# Patient Record
Sex: FEMALE | Race: WHITE | NOT HISPANIC OR LATINO | Employment: OTHER | URBAN - METROPOLITAN AREA
[De-identification: names, ages, dates, MRNs, and addresses within clinical notes are randomized per-mention and may not be internally consistent; named-entity substitution may affect disease eponyms.]

---

## 2017-02-27 ENCOUNTER — CONVERSION ENCOUNTER (OUTPATIENT)
Dept: GENERAL RADIOLOGY | Facility: HOSPITAL | Age: 57
End: 2017-02-27

## 2018-05-11 ENCOUNTER — OFFICE VISIT CONVERTED (OUTPATIENT)
Dept: FAMILY MEDICINE CLINIC | Facility: CLINIC | Age: 58
End: 2018-05-11
Attending: PHYSICIAN ASSISTANT

## 2018-06-15 ENCOUNTER — CONVERSION ENCOUNTER (OUTPATIENT)
Dept: GENERAL RADIOLOGY | Facility: HOSPITAL | Age: 58
End: 2018-06-15

## 2018-08-02 ENCOUNTER — CONVERSION ENCOUNTER (OUTPATIENT)
Dept: PODIATRY | Facility: CLINIC | Age: 58
End: 2018-08-02

## 2018-08-02 ENCOUNTER — OFFICE VISIT CONVERTED (OUTPATIENT)
Dept: PODIATRY | Facility: CLINIC | Age: 58
End: 2018-08-02
Attending: PODIATRIST

## 2018-08-24 ENCOUNTER — CONVERSION ENCOUNTER (OUTPATIENT)
Dept: PODIATRY | Facility: CLINIC | Age: 58
End: 2018-08-24

## 2018-08-24 ENCOUNTER — OFFICE VISIT CONVERTED (OUTPATIENT)
Dept: PODIATRY | Facility: CLINIC | Age: 58
End: 2018-08-24
Attending: PODIATRIST

## 2018-09-14 ENCOUNTER — OFFICE VISIT CONVERTED (OUTPATIENT)
Dept: PODIATRY | Facility: CLINIC | Age: 58
End: 2018-09-14
Attending: PODIATRIST

## 2018-09-14 ENCOUNTER — CONVERSION ENCOUNTER (OUTPATIENT)
Dept: PODIATRY | Facility: CLINIC | Age: 58
End: 2018-09-14

## 2019-05-13 ENCOUNTER — CONVERSION ENCOUNTER (OUTPATIENT)
Dept: FAMILY MEDICINE CLINIC | Facility: CLINIC | Age: 59
End: 2019-05-13

## 2019-05-13 ENCOUNTER — OFFICE VISIT CONVERTED (OUTPATIENT)
Dept: FAMILY MEDICINE CLINIC | Facility: CLINIC | Age: 59
End: 2019-05-13
Attending: PHYSICIAN ASSISTANT

## 2019-05-28 ENCOUNTER — HOSPITAL ENCOUNTER (OUTPATIENT)
Dept: LAB | Facility: HOSPITAL | Age: 59
Discharge: HOME | End: 2019-05-28
Attending: PHYSICIAN ASSISTANT

## 2019-05-28 LAB
25(OH)D3 SERPL-MCNC: 20.4 NG/ML (ref 30–100)
ALBUMIN SERPL-MCNC: 4.1 G/DL (ref 3.5–5)
ALBUMIN/GLOB SERPL: 1.5 {RATIO} (ref 1.4–2.6)
ALP SERPL-CCNC: 55 U/L (ref 53–141)
ALT SERPL-CCNC: 85 U/L (ref 10–40)
ANION GAP SERPL CALC-SCNC: 19 MMOL/L (ref 8–19)
APPEARANCE UR: ABNORMAL
AST SERPL-CCNC: 50 U/L (ref 15–50)
BASOPHILS # BLD AUTO: 0.07 10*3/UL (ref 0–0.2)
BASOPHILS NFR BLD AUTO: 1.2 % (ref 0–3)
BILIRUB SERPL-MCNC: 1.12 MG/DL (ref 0.2–1.3)
BILIRUB UR QL: NEGATIVE
BUN SERPL-MCNC: 10 MG/DL (ref 5–25)
BUN/CREAT SERPL: 15 {RATIO} (ref 6–20)
CALCIUM SERPL-MCNC: 9.8 MG/DL (ref 8.7–10.4)
CHLORIDE SERPL-SCNC: 104 MMOL/L (ref 99–111)
CHOLEST SERPL-MCNC: 197 MG/DL (ref 107–200)
CHOLEST/HDLC SERPL: 3.2 {RATIO} (ref 3–6)
COLOR UR: ABNORMAL
CONV ABS IMM GRAN: 0.02 10*3/UL (ref 0–0.2)
CONV BACTERIA: NEGATIVE
CONV CO2: 24 MMOL/L (ref 22–32)
CONV COLLECTION SOURCE (UA): ABNORMAL
CONV CRYSTALS: ABNORMAL /[HPF]
CONV HYALINE CASTS IN URINE MICRO: ABNORMAL /[LPF]
CONV IMMATURE GRAN: 0.4 % (ref 0–1.8)
CONV TOTAL PROTEIN: 6.8 G/DL (ref 6.3–8.2)
CONV UROBILINOGEN IN URINE BY AUTOMATED TEST STRIP: 0.2 {EHRLICHU}/DL (ref 0.1–1)
CREAT UR-MCNC: 0.67 MG/DL (ref 0.5–0.9)
DEPRECATED RDW RBC AUTO: 41.1 FL (ref 36.4–46.3)
EOSINOPHIL # BLD AUTO: 0.22 10*3/UL (ref 0–0.7)
EOSINOPHIL # BLD AUTO: 3.9 % (ref 0–7)
ERYTHROCYTE [DISTWIDTH] IN BLOOD BY AUTOMATED COUNT: 12.5 % (ref 11.7–14.4)
EST. AVERAGE GLUCOSE BLD GHB EST-MCNC: 128 MG/DL
FOLATE SERPL-MCNC: 13.4 NG/ML (ref 4.8–20)
GFR SERPLBLD BASED ON 1.73 SQ M-ARVRAT: >60 ML/MIN/{1.73_M2}
GLOBULIN UR ELPH-MCNC: 2.7 G/DL (ref 2–3.5)
GLUCOSE SERPL-MCNC: 127 MG/DL (ref 65–99)
GLUCOSE UR QL: NEGATIVE MG/DL
HBA1C MFR BLD: 15.6 G/DL (ref 12–16)
HBA1C MFR BLD: 6.1 % (ref 3.5–5.7)
HCT VFR BLD AUTO: 47.4 % (ref 37–47)
HDLC SERPL-MCNC: 62 MG/DL (ref 40–60)
HGB UR QL STRIP: NEGATIVE
KETONES UR QL STRIP: NEGATIVE MG/DL
LDLC SERPL CALC-MCNC: 117 MG/DL (ref 70–100)
LEUKOCYTE ESTERASE UR QL STRIP: NEGATIVE
LYMPHOCYTES # BLD AUTO: 1.52 10*3/UL (ref 1–5)
MCH RBC QN AUTO: 30.1 PG (ref 27–31)
MCHC RBC AUTO-ENTMCNC: 32.9 G/DL (ref 33–37)
MCV RBC AUTO: 91.3 FL (ref 81–99)
MONOCYTES # BLD AUTO: 0.54 10*3/UL (ref 0.2–1.2)
MONOCYTES NFR BLD AUTO: 9.5 % (ref 3–10)
NEUTROPHILS # BLD AUTO: 3.34 10*3/UL (ref 2–8)
NEUTROPHILS NFR BLD AUTO: 58.4 % (ref 30–85)
NITRITE UR QL STRIP: NEGATIVE
NRBC CBCN: 0 % (ref 0–0.7)
OSMOLALITY SERPL CALC.SUM OF ELEC: 297 MOSM/KG (ref 273–304)
PH UR STRIP.AUTO: 5.5 [PH] (ref 5–8)
PLATELET # BLD AUTO: 283 10*3/UL (ref 130–400)
PMV BLD AUTO: 11.2 FL (ref 9.4–12.3)
POTASSIUM SERPL-SCNC: 4.1 MMOL/L (ref 3.5–5.3)
PROT UR QL: NEGATIVE MG/DL
RBC # BLD AUTO: 5.19 10*6/UL (ref 4.2–5.4)
RBC #/AREA URNS HPF: ABNORMAL /[HPF]
SODIUM SERPL-SCNC: 143 MMOL/L (ref 135–147)
SP GR UR: 1.02 (ref 1–1.03)
SQUAMOUS SPT QL MICRO: ABNORMAL /[HPF]
T4 FREE SERPL-MCNC: 1.1 NG/DL (ref 0.9–1.8)
TRIGL SERPL-MCNC: 88 MG/DL (ref 40–150)
TSH SERPL-ACNC: 3.19 M[IU]/L (ref 0.27–4.2)
VARIANT LYMPHS NFR BLD MANUAL: 26.6 % (ref 20–45)
VIT B12 SERPL-MCNC: 796 PG/ML (ref 211–911)
VLDLC SERPL-MCNC: 18 MG/DL (ref 5–37)
WBC # BLD AUTO: 5.71 10*3/UL (ref 4.8–10.8)
WBC #/AREA URNS HPF: ABNORMAL /[HPF]

## 2019-05-29 LAB — HAV AB SER QL IA: POSITIVE

## 2019-05-30 LAB — IRON SERPL-MCNC: 96 UG/DL (ref 60–170)

## 2019-06-17 ENCOUNTER — HOSPITAL ENCOUNTER (OUTPATIENT)
Dept: GENERAL RADIOLOGY | Facility: HOSPITAL | Age: 59
Discharge: HOME | End: 2019-06-17
Attending: PHYSICIAN ASSISTANT

## 2019-08-14 ENCOUNTER — HOSPITAL ENCOUNTER (OUTPATIENT)
Dept: GENERAL RADIOLOGY | Facility: HOSPITAL | Age: 59
Discharge: HOME | End: 2019-08-14
Attending: PHYSICIAN ASSISTANT

## 2020-01-24 ENCOUNTER — OFFICE VISIT CONVERTED (OUTPATIENT)
Dept: FAMILY MEDICINE CLINIC | Facility: CLINIC | Age: 60
End: 2020-01-24
Attending: PHYSICIAN ASSISTANT

## 2020-01-31 ENCOUNTER — OFFICE VISIT CONVERTED (OUTPATIENT)
Dept: SURGERY | Facility: CLINIC | Age: 60
End: 2020-01-31
Attending: SURGERY

## 2020-02-06 ENCOUNTER — HOSPITAL ENCOUNTER (OUTPATIENT)
Dept: PERIOP | Facility: HOSPITAL | Age: 60
Setting detail: HOSPITAL OUTPATIENT SURGERY
Discharge: HOME | End: 2020-02-06
Attending: SURGERY

## 2020-02-24 ENCOUNTER — OFFICE VISIT CONVERTED (OUTPATIENT)
Dept: SURGERY | Facility: CLINIC | Age: 60
End: 2020-02-24
Attending: SURGERY

## 2020-03-09 ENCOUNTER — OFFICE VISIT CONVERTED (OUTPATIENT)
Dept: PLASTIC SURGERY | Facility: CLINIC | Age: 60
End: 2020-03-09
Attending: PLASTIC SURGERY

## 2020-03-12 ENCOUNTER — CONVERSION ENCOUNTER (OUTPATIENT)
Dept: FAMILY MEDICINE CLINIC | Facility: CLINIC | Age: 60
End: 2020-03-12

## 2020-03-12 ENCOUNTER — OFFICE VISIT CONVERTED (OUTPATIENT)
Dept: FAMILY MEDICINE CLINIC | Facility: CLINIC | Age: 60
End: 2020-03-12
Attending: PHYSICIAN ASSISTANT

## 2020-03-18 ENCOUNTER — HOSPITAL ENCOUNTER (OUTPATIENT)
Dept: PHYSICAL THERAPY | Facility: CLINIC | Age: 60
Setting detail: RECURRING SERIES
Discharge: HOME | End: 2020-07-08
Attending: FAMILY MEDICINE

## 2020-09-22 ENCOUNTER — HOSPITAL ENCOUNTER (OUTPATIENT)
Dept: MAMMOGRAPHY | Facility: HOSPITAL | Age: 60
Discharge: HOME | End: 2020-09-22
Attending: PHYSICIAN ASSISTANT

## 2021-03-26 ENCOUNTER — OFFICE VISIT CONVERTED (OUTPATIENT)
Dept: PLASTIC SURGERY | Facility: CLINIC | Age: 61
End: 2021-03-26
Attending: PLASTIC SURGERY

## 2021-03-26 ENCOUNTER — CONVERSION ENCOUNTER (OUTPATIENT)
Dept: PLASTIC SURGERY | Facility: CLINIC | Age: 61
End: 2021-03-26

## 2021-04-02 ENCOUNTER — HOSPITAL ENCOUNTER (OUTPATIENT)
Dept: LAB | Facility: HOSPITAL | Age: 61
Discharge: HOME | End: 2021-04-02
Attending: PHYSICIAN ASSISTANT

## 2021-04-02 LAB
EST. AVERAGE GLUCOSE BLD GHB EST-MCNC: 237 MG/DL
HBA1C MFR BLD: 9.9 % (ref 3.5–5.7)

## 2021-05-14 VITALS
OXYGEN SATURATION: 98 % | WEIGHT: 237.12 LBS | TEMPERATURE: 97.8 F | HEART RATE: 89 BPM | DIASTOLIC BLOOD PRESSURE: 74 MMHG | SYSTOLIC BLOOD PRESSURE: 145 MMHG

## 2021-05-14 NOTE — PROGRESS NOTES
Progress Note      Patient Name: Cassandra Maria   Patient ID: [id number]   Sex: Female   YOB: 1960    Primary Care Provider: Manuel Thomas PA-C   Referring Provider: Manuel Thomas PA-C    Visit Date: March 26, 2021    Provider: Isis Nice MD   Location: [facility]   Location Address: [address] [place] [address]  [id number]   Location Phone: [phone]          History Of Present Illness  Cassandra Maria is a 60 year old /White female who presents to the office today as a consult from Manuel Thomas PA-C.   Cassandra Maria is a 59 year old /White female who presents to the office today as a consult from Manuel Thomas PA-C and would like to discuss breast reduction. Current bra size 46 G , would like to be smaller. Positive family (mom and sister) have a history of breast cancer. Neck, shoulders, and upper back pain present for 20+ years. Conservative treatments of ibuprofen, physical therapy , with little or temporary relief. Experiences rashes, treats with baby powder, and has been prescribed anti fungal powder. Can't sleep on back, can't breathe. Cannot exercise , cannot do ACTIVITIES that she would like to do, unable to wear a sports bra that fits .      Patient would like her breasts to be smaller, more proportionate. Mother (diagnosed at 68)  and sister (diagnosed at 42) had breast cancer. No genetic testing. Mammogram in 9/2020, benign. Had any physical therapy for neck and back pain but pulling of breast during activity caused more pain and headaches. Has been prescribed Nystatin for the rashes under breasts. Does not smoke. Denies being diabetic or hypertension problems but last A1c 6.1%. Will see PCP for physical and surgery clearance prior to procedure.  Patient is unemployed.         Recommendation: get physical by PCP, submit to insurance for  breast reduction       Past Medical History  Bone Density Screening; Foot  pain, right; Fracture of 5th metatarsal; Gall Stones; Heel pain; Hernia; Hot flashes; Hyperlipidemia; Impaired fasting glucose; Joint pain; Low back pain; Lumbar pain with radiation down left leg; Macromastia; Migraine Headaches; Night sweats; Obesity; Osteopenia; Osteoporosis; Pain, Thoracic Spine; Screening Mammogram; Sinus Trouble; unspecified; Vitamin D deficiency         Past Surgical History  Bladder sling surgery in female; Caesarean section; Cholecystectomy; Colonoscopy; EGD; Hernia-Ventral; Hysterectomy; Rotator Cuff repair         Medication List  multivitamin oral tablet; nystatin 100,000 unit/gram topical powder         Allergy List  NO KNOWN DRUG ALLERGIES       Allergies Reconciled  Family Medical History  Breast Neoplasm, Malignant; Liver Neoplasm, Malignant; Colon Cancer; Lung cancer; Diabetes         Reproductive History   1 Para 1 0 0 0 & Postmenopausal       Social History  Active but no formal exercise; Alcohol (Never); ; No known infection risk; Tobacco (Never)         Immunizations  Name Date Admin   Influenza 2017   Influenza 2015   Prevnar 13 2017   Tdap 2016         Vitals  Date Time BP Position Site L\R Cuff Size HR RR TEMP (F) WT  HT  BMI kg/m2 BSA m2 O2 Sat FR L/min FiO2 HC       2021 11:23 /74 Sitting    89 - R  97.8 237lbs 2oz    98 %  21%          Physical Examination  · Constitutional  o Appearance  o : well developed, well-nourished, Alert and oriented X3  · Eyes  o Sclerae  o : sclerae white  · Respiratory  o Respiratory Effort  o : breathing unlabored   · Cardiovascular  o Heart  o : regular rate  · Breasts  o FEMALE BREAST EXAM  o :   § Masses  § : no masses  § Nipple Discharge  § : no nipple discharge  § Axillary Lymphadenopathy  § : no axillary lymphadenopathy  § SN-N (Right Breast)  § : 42  § SN-N (Left Breast)  § : 41  § SN-IMF (Right Breast)  § : 28  § SN-IMF (Left Breast)  § : 28  § N-IMF stretch (Right Breast)  § :  16  § N-IMF stretch (Left Breast)  § : 16  · Gastrointestinal  o Abdominal Examination  o : abdomen nontender to palpation  · Lymphatic  o Axilla  o : No lymphadenopathy present  · Skin and Subcutaneous Tissue  o General Inspection  o : no lesions present, no areas of discoloration, skin turgor normal, texture normal  · Psychiatric  o Judgement and Insight  o : judgment and insight intact  o Mood and Affect  o : mood normal, affect appropriate  · Schnur Scale  o Schnur Scale Score  o : 819  · BSA  o BSA  o : 2.16     rash and moisture under breasts with hyperpigmentation, significant shoulder grooving right breast larger than left           Assessment  · Macromastia       Hypertrophy of breast     611.1/N62  · Obesity     278.00/E66.9      Plan  · Orders  o Plastics reconstructive visit (PSREC) - - 03/26/2021  o ACO-16: BMI above normal range and follow-up plan is documented (, ) - - 03/26/2021  · Medications  o Medications have been Reconciled  o Transition of Care or Provider Policy  · Instructions  o The patient was given literature in regards to the procedure and encouraged to visit the websites of ASPS and ASAPS.  o Greater than 45 min of time was spent directly with the pt in counseling & coordination of care.  o We discussed the procedure for bilateral breast reduction under general anesthesia as well as the postoperative recovery time and the need for limitation of activities. The risks of surgery were discussed including bleeding, infection, scarring (for which diagrams were drawn), pain, poor healing, loss of skin and or nipple, change in nipple sensation, inability to breast feed, asymmetry, no guarantee of postoperative cup size. Patient has tried conservative treatment and feel breast size is impeding weight loss and activity. Discussed possible free nipple graft due to size of breast. Patient understands risk and wants to proceed. Will submit to insurance for approval.   o I think that this  patient is an excellent candidate for a breast reduction. I feel that this procedure is medically necessary to relieve her symptoms. I would anticipate resecting at least 819 grams of breast tissue from each breast. Photographs were taken. We will contact the insurance company for preauthorization. This patient has my office number to call with any further questions.  o Electronically Identified Patient Education Materials Provided Electronically            Electronically Signed by: GENESIS Reinoso -Author on March 26, 2021 12:02:05 PM

## 2021-05-15 VITALS
DIASTOLIC BLOOD PRESSURE: 79 MMHG | OXYGEN SATURATION: 95 % | HEART RATE: 85 BPM | BODY MASS INDEX: 47.93 KG/M2 | WEIGHT: 244.12 LBS | HEIGHT: 60 IN | SYSTOLIC BLOOD PRESSURE: 146 MMHG

## 2021-05-15 VITALS
SYSTOLIC BLOOD PRESSURE: 146 MMHG | HEART RATE: 96 BPM | WEIGHT: 242.5 LBS | DIASTOLIC BLOOD PRESSURE: 92 MMHG | HEIGHT: 60 IN | BODY MASS INDEX: 47.61 KG/M2 | OXYGEN SATURATION: 95 %

## 2021-05-15 VITALS
SYSTOLIC BLOOD PRESSURE: 135 MMHG | BODY MASS INDEX: 47.71 KG/M2 | HEIGHT: 60 IN | OXYGEN SATURATION: 98 % | WEIGHT: 243 LBS | DIASTOLIC BLOOD PRESSURE: 67 MMHG | HEART RATE: 76 BPM

## 2021-05-15 VITALS
SYSTOLIC BLOOD PRESSURE: 143 MMHG | OXYGEN SATURATION: 95 % | DIASTOLIC BLOOD PRESSURE: 88 MMHG | HEIGHT: 60 IN | WEIGHT: 241.5 LBS | BODY MASS INDEX: 47.41 KG/M2 | HEART RATE: 91 BPM

## 2021-05-15 VITALS — HEIGHT: 60 IN | RESPIRATION RATE: 16 BRPM | WEIGHT: 240 LBS | BODY MASS INDEX: 47.12 KG/M2

## 2021-05-15 VITALS — BODY MASS INDEX: 47.32 KG/M2 | HEIGHT: 60 IN | WEIGHT: 241 LBS | RESPIRATION RATE: 14 BRPM

## 2021-05-16 VITALS
OXYGEN SATURATION: 99 % | BODY MASS INDEX: 46.13 KG/M2 | SYSTOLIC BLOOD PRESSURE: 149 MMHG | WEIGHT: 235 LBS | HEIGHT: 60 IN | DIASTOLIC BLOOD PRESSURE: 73 MMHG | HEART RATE: 72 BPM

## 2021-05-16 VITALS — HEIGHT: 60 IN | OXYGEN SATURATION: 99 % | WEIGHT: 233 LBS | HEART RATE: 82 BPM | BODY MASS INDEX: 45.75 KG/M2

## 2021-05-16 VITALS
DIASTOLIC BLOOD PRESSURE: 75 MMHG | SYSTOLIC BLOOD PRESSURE: 146 MMHG | HEIGHT: 60 IN | BODY MASS INDEX: 45.18 KG/M2 | OXYGEN SATURATION: 97 % | HEART RATE: 77 BPM | WEIGHT: 230.12 LBS

## 2021-05-16 VITALS
HEIGHT: 60 IN | DIASTOLIC BLOOD PRESSURE: 60 MMHG | OXYGEN SATURATION: 100 % | SYSTOLIC BLOOD PRESSURE: 135 MMHG | WEIGHT: 233 LBS | BODY MASS INDEX: 45.75 KG/M2 | HEART RATE: 67 BPM

## 2021-05-22 ENCOUNTER — TRANSCRIBE ORDERS (OUTPATIENT)
Dept: LAB | Facility: HOSPITAL | Age: 61
End: 2021-05-22

## 2021-05-22 DIAGNOSIS — Z01.818 PRE-OP TESTING: Primary | ICD-10-CM

## 2021-07-07 ENCOUNTER — TELEPHONE (OUTPATIENT)
Dept: PLASTIC SURGERY | Facility: CLINIC | Age: 61
End: 2021-07-07

## 2021-07-07 NOTE — TELEPHONE ENCOUNTER
Patient called back asking for additional providers as Dr. Polanco/Dr. Zhang office does not accept her insurance. Provided with [facility] plastic surgery and  plastic surgery phone numbers.

## 2021-07-07 NOTE — TELEPHONE ENCOUNTER
Patient called back. Advised that Dr. Nice would no longer be with [facility] [place] of [place]21. That with this new development all surgical cases are being canceled as appropriate follow up care cannot occur. Patient verbalized understanding. Patient states that she will contact Dr. Polanco/Dr. Zhang office to schedule consultation

## 2021-07-07 NOTE — TELEPHONE ENCOUNTER
advised that Dr. Nice would no longer be with [facility] as of 7/31/21. That with this new development all surgical cases are being canceled as appropriate follow up care cannot occur. Patient verbalized understanding. Patient will contact Dr. Polanco/Dr. Zhang office to establish care.

## 2021-08-12 ENCOUNTER — TELEPHONE (OUTPATIENT)
Dept: FAMILY MEDICINE CLINIC | Facility: CLINIC | Age: 61
End: 2021-08-12

## 2021-08-12 DIAGNOSIS — Z12.31 ENCOUNTER FOR SCREENING MAMMOGRAM FOR MALIGNANT NEOPLASM OF BREAST: Primary | ICD-10-CM

## 2021-08-12 NOTE — TELEPHONE ENCOUNTER
Caller: Cassandra Maria    Relationship to patient: Self    Best call back number: [phone]    Patient is needing: PATIENT CALLED STATING SHE GETS A MAMMOGRAM EVERY YEAR AND THAT ITS ABOUT THAT TIME FOR HER TO GET ONE. SHE STATED NORMALLY SHE GETS A LETTER IN THE MAIL STATING ITS TIME TO SCHEDULE THE APPOINTMENT, PATIENT IS UNSURE IF SHE NEEDS TO JUST LET HER PCP KNOW THAT SHE NEEDS A REFERRAL TO BE SENT OVER OR IF SHE HAS TO MAKE AN APPOINTMENT. PATIENT ALSO WOULD LIKE THE REFERRAL TO BE SENT TO THE [facility] BEHIND THE OFFICE. SHE IS UNSURE WHAT THE ADDRESS IS. PATIENT WOULD LIKE A CALL BACK REGARDING THIS SITUATION PLEASE ADVISE THANK YOU .

## 2021-09-14 ENCOUNTER — TELEPHONE (OUTPATIENT)
Dept: FAMILY MEDICINE CLINIC | Facility: CLINIC | Age: 61
End: 2021-09-14

## 2021-09-14 DIAGNOSIS — R73.01 IMPAIRED FASTING GLUCOSE: Primary | ICD-10-CM

## 2021-09-14 DIAGNOSIS — E55.9 VITAMIN D DEFICIENCY: ICD-10-CM

## 2021-09-14 DIAGNOSIS — E78.5 HYPERLIPIDEMIA, UNSPECIFIED HYPERLIPIDEMIA TYPE: ICD-10-CM

## 2021-09-14 PROBLEM — S92.353A FRACTURE OF 5TH METATARSAL: Status: ACTIVE | Noted: 2018-08-02

## 2021-09-14 PROBLEM — E66.9 OBESITY: Status: ACTIVE | Noted: 2018-05-11

## 2021-09-14 PROBLEM — G43.909 MIGRAINE: Status: ACTIVE | Noted: 2021-09-14

## 2021-09-14 PROBLEM — K80.20 GALL STONES: Status: ACTIVE | Noted: 2021-09-14

## 2021-09-14 NOTE — TELEPHONE ENCOUNTER
Caller: Cassandra Maria    Relationship: Self    Best call back number: [phone]    Medication needed:   METFORMIN 500 MG TWICE DAILY     When do you need the refill by: ASAP    What additional details did the patient provide when requesting the medication: PATIENT HAS NONE    Does the patient have less than a 3 day supply:  [x] Yes  [] No    What is the patient's preferred pharmacy: [id number] [place] - [address] AT [facility] [phone] [facility] [phone]      PATIENT WOULD LIKE TO HAVE LABS AHEAD OF HER APPOINTMENT. PLEASE CALL PATIENT AND ADVISE.

## 2021-09-22 ENCOUNTER — LAB (OUTPATIENT)
Dept: LAB | Facility: HOSPITAL | Age: 61
End: 2021-09-22

## 2021-09-22 DIAGNOSIS — E78.5 HYPERLIPIDEMIA, UNSPECIFIED HYPERLIPIDEMIA TYPE: ICD-10-CM

## 2021-09-22 DIAGNOSIS — R89.9 ABNORMAL LABORATORY TEST: ICD-10-CM

## 2021-09-22 DIAGNOSIS — E55.9 VITAMIN D DEFICIENCY: ICD-10-CM

## 2021-09-22 DIAGNOSIS — R73.01 IMPAIRED FASTING GLUCOSE: ICD-10-CM

## 2021-09-22 LAB
25(OH)D3 SERPL-MCNC: 19.3 NG/ML
ALBUMIN SERPL-MCNC: 4.1 G/DL (ref 3.5–5.2)
ALBUMIN/GLOB SERPL: 1.4 G/DL
ALP SERPL-CCNC: 65 U/L (ref 39–117)
ALT SERPL W P-5'-P-CCNC: 109 U/L (ref 1–33)
ANION GAP SERPL CALCULATED.3IONS-SCNC: 11 MMOL/L (ref 5–15)
AST SERPL-CCNC: 62 U/L (ref 1–32)
BASOPHILS # BLD AUTO: 0.07 10*3/MM3 (ref 0–0.2)
BASOPHILS NFR BLD AUTO: 1.3 % (ref 0–1.5)
BILIRUB SERPL-MCNC: 1.5 MG/DL (ref 0–1.2)
BUN SERPL-MCNC: 12 MG/DL (ref 8–23)
BUN/CREAT SERPL: 18.8 (ref 7–25)
CALCIUM SPEC-SCNC: 9.5 MG/DL (ref 8.6–10.5)
CHLORIDE SERPL-SCNC: 101 MMOL/L (ref 98–107)
CHOLEST SERPL-MCNC: 246 MG/DL (ref 0–200)
CO2 SERPL-SCNC: 23 MMOL/L (ref 22–29)
CREAT SERPL-MCNC: 0.64 MG/DL (ref 0.57–1)
DEPRECATED RDW RBC AUTO: 39.9 FL (ref 37–54)
EOSINOPHIL # BLD AUTO: 0.27 10*3/MM3 (ref 0–0.4)
EOSINOPHIL NFR BLD AUTO: 5 % (ref 0.3–6.2)
ERYTHROCYTE [DISTWIDTH] IN BLOOD BY AUTOMATED COUNT: 11.7 % (ref 12.3–15.4)
GFR SERPL CREATININE-BSD FRML MDRD: 94 ML/MIN/1.73
GLOBULIN UR ELPH-MCNC: 2.9 GM/DL
GLUCOSE SERPL-MCNC: 250 MG/DL (ref 65–99)
HBA1C MFR BLD: 9.69 % (ref 4.8–5.6)
HCT VFR BLD AUTO: 48.2 % (ref 34–46.6)
HDLC SERPL-MCNC: 56 MG/DL (ref 40–60)
HGB BLD-MCNC: 16.4 G/DL (ref 12–15.9)
IMM GRANULOCYTES # BLD AUTO: 0.02 10*3/MM3 (ref 0–0.05)
IMM GRANULOCYTES NFR BLD AUTO: 0.4 % (ref 0–0.5)
LDLC SERPL CALC-MCNC: 170 MG/DL (ref 0–100)
LDLC/HDLC SERPL: 3 {RATIO}
LYMPHOCYTES # BLD AUTO: 1.64 10*3/MM3 (ref 0.7–3.1)
LYMPHOCYTES NFR BLD AUTO: 30.3 % (ref 19.6–45.3)
MCH RBC QN AUTO: 31.4 PG (ref 26.6–33)
MCHC RBC AUTO-ENTMCNC: 34 G/DL (ref 31.5–35.7)
MCV RBC AUTO: 92.3 FL (ref 79–97)
MONOCYTES # BLD AUTO: 0.45 10*3/MM3 (ref 0.1–0.9)
MONOCYTES NFR BLD AUTO: 8.3 % (ref 5–12)
NEUTROPHILS NFR BLD AUTO: 2.96 10*3/MM3 (ref 1.7–7)
NEUTROPHILS NFR BLD AUTO: 54.7 % (ref 42.7–76)
NRBC BLD AUTO-RTO: 0 /100 WBC (ref 0–0.2)
PLATELET # BLD AUTO: 265 10*3/MM3 (ref 140–450)
PMV BLD AUTO: 11.4 FL (ref 6–12)
POTASSIUM SERPL-SCNC: 4.1 MMOL/L (ref 3.5–5.2)
PROT SERPL-MCNC: 7 G/DL (ref 6–8.5)
RBC # BLD AUTO: 5.22 10*6/MM3 (ref 3.77–5.28)
SODIUM SERPL-SCNC: 135 MMOL/L (ref 136–145)
T4 FREE SERPL-MCNC: 1.1 NG/DL (ref 0.93–1.7)
TRIGL SERPL-MCNC: 111 MG/DL (ref 0–150)
TSH SERPL DL<=0.05 MIU/L-ACNC: 1.25 UIU/ML (ref 0.27–4.2)
VLDLC SERPL-MCNC: 20 MG/DL (ref 5–40)
WBC # BLD AUTO: 5.41 10*3/MM3 (ref 3.4–10.8)

## 2021-09-22 PROCEDURE — 80053 COMPREHEN METABOLIC PANEL: CPT

## 2021-09-22 PROCEDURE — 83540 ASSAY OF IRON: CPT

## 2021-09-22 PROCEDURE — 82306 VITAMIN D 25 HYDROXY: CPT

## 2021-09-22 PROCEDURE — 80061 LIPID PANEL: CPT

## 2021-09-22 PROCEDURE — 84439 ASSAY OF FREE THYROXINE: CPT

## 2021-09-22 PROCEDURE — 84466 ASSAY OF TRANSFERRIN: CPT

## 2021-09-22 PROCEDURE — 85025 COMPLETE CBC W/AUTO DIFF WBC: CPT

## 2021-09-22 PROCEDURE — 36415 COLL VENOUS BLD VENIPUNCTURE: CPT

## 2021-09-22 PROCEDURE — 83036 HEMOGLOBIN GLYCOSYLATED A1C: CPT

## 2021-09-22 PROCEDURE — 84443 ASSAY THYROID STIM HORMONE: CPT

## 2021-09-23 ENCOUNTER — TELEPHONE (OUTPATIENT)
Dept: FAMILY MEDICINE CLINIC | Facility: CLINIC | Age: 61
End: 2021-09-23

## 2021-09-23 DIAGNOSIS — E55.9 VITAMIN D DEFICIENCY: ICD-10-CM

## 2021-09-23 DIAGNOSIS — R89.9 ABNORMAL LABORATORY TEST: Primary | ICD-10-CM

## 2021-09-23 DIAGNOSIS — R79.89 ELEVATED LFTS: ICD-10-CM

## 2021-09-23 LAB
IRON 24H UR-MRATE: 118 MCG/DL (ref 37–145)
IRON SATN MFR SERPL: 27 % (ref 20–50)
TIBC SERPL-MCNC: 435 MCG/DL (ref 298–536)
TRANSFERRIN SERPL-MCNC: 292 MG/DL (ref 200–360)

## 2021-09-23 RX ORDER — ERGOCALCIFEROL 1.25 MG/1
50000 CAPSULE ORAL
Qty: 13 CAPSULE | Refills: 1 | Status: SHIPPED | OUTPATIENT
Start: 2021-09-23 | End: 2021-09-30 | Stop reason: SDUPTHER

## 2021-09-23 RX ORDER — IBUPROFEN 800 MG/1
TABLET ORAL
COMMUNITY
End: 2022-05-03 | Stop reason: SDUPTHER

## 2021-09-23 RX ORDER — OFLOXACIN 3 MG/ML
SOLUTION/ DROPS OPHTHALMIC
COMMUNITY
Start: 2021-08-13 | End: 2022-01-03

## 2021-09-23 RX ORDER — DICLOFENAC SODIUM 1 MG/ML
SOLUTION/ DROPS OPHTHALMIC
COMMUNITY
Start: 2021-08-13 | End: 2022-01-03

## 2021-09-23 RX ORDER — PREDNISOLONE ACETATE 10 MG/ML
SUSPENSION/ DROPS OPHTHALMIC
COMMUNITY
Start: 2021-08-15 | End: 2022-01-03

## 2021-09-23 NOTE — TELEPHONE ENCOUNTER
----- Message from JUDD Solis sent at 9/23/2021 12:47 PM EDT -----  T4/TSH - normal  DM - uncontrolled - Hgba1c 9.6  Elevated LFTs - obtain Liver U/S  Chol - poor control, low chol diet and exercise  Vit D Def - begin vit D 50,000 IU weekly #[id number]  Elevated Hgb/Hct - add iron level to labs, does pt smoke?    Ensure pt has a follow up appt with me to discuss labs 30min.

## 2021-09-23 NOTE — TELEPHONE ENCOUNTER
Called pt to advise of results, rx, US, and add on lab ordered. Call transferred to front office to schedule appt

## 2021-09-30 ENCOUNTER — OFFICE VISIT (OUTPATIENT)
Dept: FAMILY MEDICINE CLINIC | Facility: CLINIC | Age: 61
End: 2021-09-30

## 2021-09-30 VITALS
BODY MASS INDEX: 44.88 KG/M2 | OXYGEN SATURATION: 97 % | HEIGHT: 60 IN | WEIGHT: 228.6 LBS | HEART RATE: 80 BPM | SYSTOLIC BLOOD PRESSURE: 147 MMHG | DIASTOLIC BLOOD PRESSURE: 78 MMHG

## 2021-09-30 DIAGNOSIS — E66.01 CLASS 3 SEVERE OBESITY DUE TO EXCESS CALORIES WITH SERIOUS COMORBIDITY AND BODY MASS INDEX (BMI) OF 40.0 TO 44.9 IN ADULT (HCC): Chronic | ICD-10-CM

## 2021-09-30 DIAGNOSIS — Z13.820 ENCOUNTER FOR OSTEOPOROSIS SCREENING IN ASYMPTOMATIC POSTMENOPAUSAL PATIENT: ICD-10-CM

## 2021-09-30 DIAGNOSIS — E11.65 UNCONTROLLED TYPE 2 DIABETES MELLITUS WITH HYPERGLYCEMIA (HCC): Primary | ICD-10-CM

## 2021-09-30 DIAGNOSIS — E55.9 VITAMIN D DEFICIENCY: Chronic | ICD-10-CM

## 2021-09-30 DIAGNOSIS — Z78.0 ENCOUNTER FOR OSTEOPOROSIS SCREENING IN ASYMPTOMATIC POSTMENOPAUSAL PATIENT: ICD-10-CM

## 2021-09-30 DIAGNOSIS — E55.9 VITAMIN D DEFICIENCY: ICD-10-CM

## 2021-09-30 DIAGNOSIS — Z23 NEED FOR INFLUENZA VACCINATION: ICD-10-CM

## 2021-09-30 PROBLEM — E66.813 CLASS 3 SEVERE OBESITY DUE TO EXCESS CALORIES WITH SERIOUS COMORBIDITY AND BODY MASS INDEX (BMI) OF 40.0 TO 44.9 IN ADULT: Status: ACTIVE | Noted: 2018-05-11

## 2021-09-30 PROCEDURE — 90471 IMMUNIZATION ADMIN: CPT | Performed by: PHYSICIAN ASSISTANT

## 2021-09-30 PROCEDURE — 99214 OFFICE O/P EST MOD 30 MIN: CPT | Performed by: PHYSICIAN ASSISTANT

## 2021-09-30 PROCEDURE — 90686 IIV4 VACC NO PRSV 0.5 ML IM: CPT | Performed by: PHYSICIAN ASSISTANT

## 2021-09-30 RX ORDER — ERGOCALCIFEROL 1.25 MG/1
50000 CAPSULE ORAL
Qty: 13 CAPSULE | Refills: 1 | Status: SHIPPED | OUTPATIENT
Start: 2021-09-30 | End: 2022-09-13

## 2021-09-30 RX ORDER — SEMAGLUTIDE 1.34 MG/ML
0.25 INJECTION, SOLUTION SUBCUTANEOUS WEEKLY
Qty: 1 PEN | Refills: 0 | Status: SHIPPED | OUTPATIENT
Start: 2021-09-30 | End: 2021-10-05

## 2021-09-30 NOTE — PROGRESS NOTES
"Chief Complaint  Follow-up ([facility] lab work) Pt is concerned with A1C and would like to discuss this.      Subjective          Cassandra Maria presents to [facility]  History of Present Illness    Pt is scheduled for breast reduction surgery, but her plastic surgeon left the practice.  So that is on hold.    Her recent labs reveal new onset DM, NIDDM    Pt has increased thirst, and some visual issues. She disc with her ophth, he felt it was diabetes related.    No CP, SOA, HA    Objective      Vital Signs:   /78 (BP Location: Right arm, Patient Position: Sitting, Cuff Size: Adult)   Pulse 80   Ht 152.4 cm (60\")   Wt 104 kg (228 lb 9.6 oz)   SpO2 97%   BMI 44.65 kg/m²       Physical Exam  Vitals and nursing note reviewed.   Constitutional:       Appearance: Normal appearance. She is obese.   HENT:      Head: Normocephalic and atraumatic.   Cardiovascular:      Rate and Rhythm: Normal rate and regular rhythm.   Pulmonary:      Effort: Pulmonary effort is normal.      Breath sounds: Normal breath sounds.   Musculoskeletal:      Cervical back: Neck supple.   Neurological:      Mental Status: She is alert.   Psychiatric:         Mood and Affect: Mood normal.         Behavior: Behavior normal.        Result Review :     Common labs    Common Labsle 4/2/21 9/22/21 9/22/21 9/22/21 9/22/21     1507 1507 1507 1507   Glucose     250 (A)   BUN     12   Creatinine     0.64   eGFR Non African Am     94   Sodium     135 (A)   Potassium     4.1   Chloride     101   Calcium     9.5   Albumin     4.10   Total Bilirubin     1.5 (A)   Alkaline Phosphatase     65   AST (SGOT)     62 (A)   ALT (SGPT)     109 (A)   WBC  5.41      Hemoglobin  16.4 (A)      Hematocrit  48.2 (A)      Platelets  265      Total Cholesterol    246 (A)    Triglycerides    111    HDL Cholesterol    56    LDL Cholesterol     170 (A)    Hemoglobin A1C 9.9 (A)  9.69 (A)     (A) Abnormal value       Comments are " available for some flowsheets but are not being displayed.                     Assessment and Plan      Diagnoses and all orders for this visit:    1. Uncontrolled type 2 diabetes mellitus with hyperglycemia (CMS/[facility]) (Primary)  -     metFORMIN (Glucophage) 1000 MG tablet; Take 1 tablet by mouth 2 (Two) Times a Day With Meals.  Dispense: 180 tablet; Refill: 1  -     Semaglutide,0.25 or 0.5MG/DOS, (Ozempic, 0.25 or 0.5 MG/DOSE,) 2 MG/1.5ML solution pen-injector; Inject 0.25 mg under the skin into the appropriate area as directed 1 (One) Time Per Week.  Dispense: 1 pen; Refill: 0  -     Hemoglobin A1c; Future  -     MicroAlbumin, Urine, Random - Urine, Clean Catch; Future  -     Comprehensive Metabolic Panel; Future    2. Vitamin D deficiency  Comments:  Vit D 50,000 IU weekly - stable  Orders:  -     vitamin D (ERGOCALCIFEROL) 1.25 MG (96660 UT) capsule capsule; Take 1 capsule by mouth Every 7 (Seven) Days.  Dispense: 13 capsule; Refill: 1  -     Vitamin D 25 Hydroxy; Future    3. Vitamin D deficiency  -     vitamin D (ERGOCALCIFEROL) 1.25 MG (03023 UT) capsule capsule; Take 1 capsule by mouth Every 7 (Seven) Days.  Dispense: 13 capsule; Refill: 1  -     Vitamin D 25 Hydroxy; Future    4. Encounter for osteoporosis screening in asymptomatic postmenopausal patient  -     DEXA Bone Density Axial    5. Need for influenza vaccination  -     FluLaval/Fluarix >6 Months (1021-1098)    6. Class 3 severe obesity due to excess calories with serious comorbidity and body mass index (BMI) of 40.0 to 44.9 in adult (CMS/[facility])  Comments:  Disc diet and exercise, fair control            Follow Up     Return in about 3 months (around 12/30/2021).     Patient was given instructions and counseling regarding her condition or for health maintenance advice. Please see specific information pulled into the AVS if appropriate.     I have reviewed information obtained and documented by others and I have confirmed the accuracy of this documented  note.    JUDD Solis

## 2021-10-04 ENCOUNTER — TELEPHONE (OUTPATIENT)
Dept: FAMILY MEDICINE CLINIC | Facility: CLINIC | Age: 61
End: 2021-10-04

## 2021-10-04 DIAGNOSIS — R73.01 IMPAIRED FASTING GLUCOSE: Primary | ICD-10-CM

## 2021-10-04 DIAGNOSIS — E11.65 UNCONTROLLED TYPE 2 DIABETES MELLITUS WITH HYPERGLYCEMIA (HCC): ICD-10-CM

## 2021-10-05 RX ORDER — DULAGLUTIDE 0.75 MG/.5ML
0.75 INJECTION, SOLUTION SUBCUTANEOUS WEEKLY
Qty: 2 ML | Refills: 0 | Status: SHIPPED | OUTPATIENT
Start: 2021-10-05 | End: 2021-10-28

## 2021-10-12 ENCOUNTER — TELEPHONE (OUTPATIENT)
Dept: FAMILY MEDICINE CLINIC | Facility: CLINIC | Age: 61
End: 2021-10-12

## 2021-10-12 ENCOUNTER — HOSPITAL ENCOUNTER (OUTPATIENT)
Dept: ULTRASOUND IMAGING | Facility: HOSPITAL | Age: 61
Discharge: HOME | End: 2021-10-12
Admitting: PHYSICIAN ASSISTANT

## 2021-10-12 DIAGNOSIS — R93.2 ABNORMAL ULTRASOUND OF LIVER: Primary | ICD-10-CM

## 2021-10-12 DIAGNOSIS — R79.89 ELEVATED LFTS: ICD-10-CM

## 2021-10-12 PROCEDURE — 76705 ECHO EXAM OF ABDOMEN: CPT

## 2021-10-12 NOTE — TELEPHONE ENCOUNTER
----- Message from JUDD Solis sent at 10/12/2021  9:08 AM EDT -----  Consult GI - abn LFTs and abn US Liver

## 2021-10-28 DIAGNOSIS — E11.65 UNCONTROLLED TYPE 2 DIABETES MELLITUS WITH HYPERGLYCEMIA (HCC): ICD-10-CM

## 2021-10-28 RX ORDER — DULAGLUTIDE 0.75 MG/.5ML
INJECTION, SOLUTION SUBCUTANEOUS
Qty: 2 ML | Refills: 0 | Status: SHIPPED | OUTPATIENT
Start: 2021-10-28 | End: 2021-11-29

## 2021-11-12 ENCOUNTER — HOSPITAL ENCOUNTER (OUTPATIENT)
Dept: MAMMOGRAPHY | Facility: HOSPITAL | Age: 61
Discharge: HOME | End: 2021-11-12
Admitting: PHYSICIAN ASSISTANT

## 2021-11-12 PROCEDURE — 77067 SCR MAMMO BI INCL CAD: CPT

## 2021-11-12 PROCEDURE — 77063 BREAST TOMOSYNTHESIS BI: CPT

## 2021-11-15 ENCOUNTER — TELEPHONE (OUTPATIENT)
Dept: FAMILY MEDICINE CLINIC | Facility: CLINIC | Age: 61
End: 2021-11-15

## 2021-11-25 DIAGNOSIS — E11.65 UNCONTROLLED TYPE 2 DIABETES MELLITUS WITH HYPERGLYCEMIA (HCC): ICD-10-CM

## 2021-11-29 RX ORDER — DULAGLUTIDE 0.75 MG/.5ML
INJECTION, SOLUTION SUBCUTANEOUS
Qty: 2 ML | Refills: 0 | Status: SHIPPED | OUTPATIENT
Start: 2021-11-29 | End: 2021-12-22

## 2021-12-01 ENCOUNTER — OFFICE VISIT (OUTPATIENT)
Dept: PLASTIC SURGERY | Facility: CLINIC | Age: 61
End: 2021-12-01

## 2021-12-01 VITALS
OXYGEN SATURATION: 98 % | WEIGHT: 219 LBS | TEMPERATURE: 96.8 F | DIASTOLIC BLOOD PRESSURE: 91 MMHG | BODY MASS INDEX: 43 KG/M2 | HEIGHT: 60 IN | SYSTOLIC BLOOD PRESSURE: 180 MMHG | HEART RATE: 78 BPM

## 2021-12-01 DIAGNOSIS — N62 MACROMASTIA: Primary | ICD-10-CM

## 2021-12-01 PROCEDURE — 99215 OFFICE O/P EST HI 40 MIN: CPT | Performed by: SURGERY

## 2021-12-01 NOTE — PROGRESS NOTES
"Chief Complaint  Follow-up (discuss BBR)    Subjective          History of Present Illness  Cassandra Maria is a 61 y.o. female who presents to [facility] as a consult from Dr. Thomas and would like to discuss breast reduction.  Her current bra size is a 46 G,  cup.  She would like to be a  Cup:C cup.  She patient DOES have a personal or family history of breast cancer.  Neck, shoulders, and upper back pain present for 20 years.  Conservative treatments of Physical therapy , with little or temporary relief.  Experiences rashes, treats with baby powder.  Trouble sleeping, cannot get comfortable.  Trouble exercising, cannot do activities that she would like to do, generally has to wear many sports bras and has to special order bras.  Recommendations for today of breast reduction.  Recommendations for weight loss in order to lower the Schur requirement. Her last mammogram was 11/2021 and it was benign. She is diabetic and her last A1c is 9.69 from Sept 2021.   Patient needs mesh in breast.   Patient needs to get her A1C at 7 or below to proceed with surgery.   Spoke to patient about brow lift and bleph. Needs a eye field study test.     Allergies: Patient has no known allergies.  Allergies Reconciled.    Review of Systems     Objective     /91 (BP Location: Left arm, Patient Position: Sitting)   Pulse 78   Temp 96.8 °F (36 °C) (Temporal)   Ht 152.4 cm (60\")   Wt 99.3 kg (219 lb)   SpO2 98%   BMI 42.77 kg/m²     Body mass index is 42.77 kg/m².    Physical Exam  Masses: No masses  Nipple Discharge: No nipple discharge  Breast Symmetry:  Axillary Lymphadenopathy: No axillary lymphadenopathy  SN-N (Right Breast):43   SN-N (Left Breast):41        N-IMF (Right Breast):18  N-IMF (Left Breast): 20  Base right - 20  Base left-20  Schnur Scale Score:  575    Result Review :     Labs reviewed           Assessment and Plan      Diagnoses and all orders for this " visit:    1. Macromastia (Primary)             Plan:  • The patient was given literature in regards to the procedure and encouraged to visit the websites of ASPS and ASAPS.  • Pictures obtained.  • We will have the patient send the report or undergo a pre-operative mammogram.  • We will have the patient obtain pre-operative clearance.  • We discussed the procedure for bilateral breast reduction under general anesthesia as well as the postoperative recover time and the need for limitation of activities.  The risks of surgery were discussed including bleeding, infection, scarring (for which diagrams were drawn), pain, poor healing, loss of skin and or nipple, change in nipple sensation, inability to breast feed, asymmetry, no guarantee of post-operative cup size.    • I think that this patient is an excellent candidate for a breast reduction.  If feel that this procedure is medically necessary to relieve her symptoms.  I would anticipate resecting at least 600 grams of breast tissue from each breast.  • Specifically on her case, she is diabetic and her last A1c is above 9. I would like that value to be lower than 7. Once her A1c improves, she will contact us and we will contact the insurance company for pre-authorization.  I plan to proceed with lower pole nipple perfusion and place galaflex mesh to hold the breast tissue to decrease bottoming out.   • This patient has my office number to call with any further questions.     I noted patient's eyes with ptotic brow and dermatochalasis, I will go ahead and order visual field test for future procedure  CPT:  66944-58 bilateral  Breast reduction  54521-76 bilateral placement of mesh.     Mesh:   galasform 3D [id number]    I spent 60 minutes caring for Cassandra on this date of service. This time includes time spent by me in the following activities:performing a medically appropriate examination and/or evaluation , counseling and educating the patient/family/caregiver,  documenting information in the medical record, and care coordination    Follow Up     No follow-ups on file.    Patient was given instructions and counseling regarding her condition. Please see specific information pulled into the AVS if appropriate.     Daniele Walker MD  12/01/2021

## 2021-12-21 DIAGNOSIS — E11.65 UNCONTROLLED TYPE 2 DIABETES MELLITUS WITH HYPERGLYCEMIA (HCC): ICD-10-CM

## 2021-12-22 RX ORDER — DULAGLUTIDE 0.75 MG/.5ML
INJECTION, SOLUTION SUBCUTANEOUS
Qty: 2 ML | Refills: 0 | Status: SHIPPED | OUTPATIENT
Start: 2021-12-22 | End: 2022-01-18

## 2021-12-30 ENCOUNTER — LAB (OUTPATIENT)
Dept: LAB | Facility: HOSPITAL | Age: 61
End: 2021-12-30

## 2021-12-30 DIAGNOSIS — E11.65 UNCONTROLLED TYPE 2 DIABETES MELLITUS WITH HYPERGLYCEMIA: ICD-10-CM

## 2021-12-30 DIAGNOSIS — E78.5 HYPERLIPIDEMIA, UNSPECIFIED HYPERLIPIDEMIA TYPE: ICD-10-CM

## 2021-12-30 DIAGNOSIS — E55.9 VITAMIN D DEFICIENCY: Chronic | ICD-10-CM

## 2021-12-30 LAB
25(OH)D3 SERPL-MCNC: 38.1 NG/ML
ALBUMIN SERPL-MCNC: 4.4 G/DL (ref 3.5–5.2)
ALBUMIN UR-MCNC: 2.5 MG/DL
ALBUMIN/GLOB SERPL: 2.1 G/DL
ALP SERPL-CCNC: 45 U/L (ref 39–117)
ALT SERPL W P-5'-P-CCNC: 74 U/L (ref 1–33)
ANION GAP SERPL CALCULATED.3IONS-SCNC: 8.6 MMOL/L (ref 5–15)
AST SERPL-CCNC: 54 U/L (ref 1–32)
BACTERIA UR QL AUTO: ABNORMAL /HPF
BILIRUB SERPL-MCNC: 1.2 MG/DL (ref 0–1.2)
BILIRUB UR QL STRIP: NEGATIVE
BUN SERPL-MCNC: 6 MG/DL (ref 8–23)
BUN/CREAT SERPL: 9.8 (ref 7–25)
CALCIUM SPEC-SCNC: 9.4 MG/DL (ref 8.6–10.5)
CHLORIDE SERPL-SCNC: 105 MMOL/L (ref 98–107)
CLARITY UR: ABNORMAL
CO2 SERPL-SCNC: 24.4 MMOL/L (ref 22–29)
COLOR UR: ABNORMAL
CREAT SERPL-MCNC: 0.61 MG/DL (ref 0.57–1)
GFR SERPL CREATININE-BSD FRML MDRD: 100 ML/MIN/1.73
GLOBULIN UR ELPH-MCNC: 2.1 GM/DL
GLUCOSE SERPL-MCNC: 101 MG/DL (ref 65–99)
GLUCOSE UR STRIP-MCNC: NEGATIVE MG/DL
HBA1C MFR BLD: 6.21 % (ref 4.8–5.6)
HGB UR QL STRIP.AUTO: NEGATIVE
HYALINE CASTS UR QL AUTO: ABNORMAL /LPF
KETONES UR QL STRIP: ABNORMAL
LEUKOCYTE ESTERASE UR QL STRIP.AUTO: ABNORMAL
NITRITE UR QL STRIP: POSITIVE
PH UR STRIP.AUTO: 5.5 [PH] (ref 5–8)
POTASSIUM SERPL-SCNC: 3.9 MMOL/L (ref 3.5–5.2)
PROT SERPL-MCNC: 6.5 G/DL (ref 6–8.5)
PROT UR QL STRIP: ABNORMAL
RBC # UR STRIP: ABNORMAL /HPF
REF LAB TEST METHOD: ABNORMAL
SODIUM SERPL-SCNC: 138 MMOL/L (ref 136–145)
SP GR UR STRIP: 1.02 (ref 1–1.03)
SQUAMOUS #/AREA URNS HPF: ABNORMAL /HPF
UROBILINOGEN UR QL STRIP: ABNORMAL
WBC # UR STRIP: ABNORMAL /HPF

## 2021-12-30 PROCEDURE — 82306 VITAMIN D 25 HYDROXY: CPT

## 2021-12-30 PROCEDURE — 36415 COLL VENOUS BLD VENIPUNCTURE: CPT

## 2021-12-30 PROCEDURE — 87086 URINE CULTURE/COLONY COUNT: CPT

## 2021-12-30 PROCEDURE — 82043 UR ALBUMIN QUANTITATIVE: CPT

## 2021-12-30 PROCEDURE — 87077 CULTURE AEROBIC IDENTIFY: CPT

## 2021-12-30 PROCEDURE — 87186 SC STD MICRODIL/AGAR DIL: CPT

## 2021-12-30 PROCEDURE — 83036 HEMOGLOBIN GLYCOSYLATED A1C: CPT

## 2021-12-30 PROCEDURE — 81001 URINALYSIS AUTO W/SCOPE: CPT

## 2021-12-30 PROCEDURE — 80053 COMPREHEN METABOLIC PANEL: CPT

## 2022-01-01 LAB — BACTERIA SPEC AEROBE CULT: ABNORMAL

## 2022-01-02 DIAGNOSIS — N30.00 ACUTE CYSTITIS WITHOUT HEMATURIA: Primary | ICD-10-CM

## 2022-01-02 RX ORDER — NITROFURANTOIN 25; 75 MG/1; MG/1
100 CAPSULE ORAL 2 TIMES DAILY
Qty: 14 CAPSULE | Refills: 0 | Status: SHIPPED | OUTPATIENT
Start: 2022-01-02 | End: 2022-04-19

## 2022-01-03 ENCOUNTER — TELEPHONE (OUTPATIENT)
Dept: PLASTIC SURGERY | Facility: CLINIC | Age: 62
End: 2022-01-03

## 2022-01-03 ENCOUNTER — OFFICE VISIT (OUTPATIENT)
Dept: FAMILY MEDICINE CLINIC | Facility: CLINIC | Age: 62
End: 2022-01-03

## 2022-01-03 ENCOUNTER — TELEPHONE (OUTPATIENT)
Dept: FAMILY MEDICINE CLINIC | Facility: CLINIC | Age: 62
End: 2022-01-03

## 2022-01-03 VITALS
WEIGHT: 216 LBS | SYSTOLIC BLOOD PRESSURE: 147 MMHG | HEART RATE: 76 BPM | BODY MASS INDEX: 42.41 KG/M2 | HEIGHT: 60 IN | DIASTOLIC BLOOD PRESSURE: 75 MMHG | OXYGEN SATURATION: 96 %

## 2022-01-03 DIAGNOSIS — N30.00 ACUTE CYSTITIS WITHOUT HEMATURIA: ICD-10-CM

## 2022-01-03 DIAGNOSIS — E66.01 CLASS 3 SEVERE OBESITY DUE TO EXCESS CALORIES WITH SERIOUS COMORBIDITY AND BODY MASS INDEX (BMI) OF 40.0 TO 44.9 IN ADULT: Chronic | ICD-10-CM

## 2022-01-03 DIAGNOSIS — E11.65 CONTROLLED TYPE 2 DIABETES MELLITUS WITH HYPERGLYCEMIA, WITHOUT LONG-TERM CURRENT USE OF INSULIN: Chronic | ICD-10-CM

## 2022-01-03 DIAGNOSIS — Z23 NEED FOR PNEUMOCOCCAL VACCINATION: ICD-10-CM

## 2022-01-03 DIAGNOSIS — R79.89 ELEVATED LFTS: Primary | Chronic | ICD-10-CM

## 2022-01-03 PROCEDURE — 99214 OFFICE O/P EST MOD 30 MIN: CPT | Performed by: PHYSICIAN ASSISTANT

## 2022-01-03 NOTE — TELEPHONE ENCOUNTER
Patient called today stating she had her bloodwork done on 12/30/2021 and her A1C was 6.21. Patient wanted to know if she can schedule her BBR ? Please advise . Thank you

## 2022-01-03 NOTE — TELEPHONE ENCOUNTER
----- Message from JUDD Solis sent at 12/31/2021  9:49 AM EST -----  Urine culture pending.  Elevated LFTs - improving some - avoid alcohol and tylenol, decrease wt

## 2022-01-03 NOTE — PROGRESS NOTES
Chief Complaint  Hyperlipidemia (3 month follow up) and Vitamin D Deficiency    Subjective          Cassandratomas Maria presents to [facility]  History of Present Illness  Pt presents today for 3 month follow up.    Pt states she would like to discuss recent labs from 21.    Pt has up coming appt w/Diane Hemphill on 1/10/22 - GI -     Pt will schd w/  for breast reduction since her A1C is below 7.    Labs 21  A1C 6.21  Dexa 22    No CP, SOA, HA    Pt is scheduled for breast reduction surgery since her Hgba1c is normal    Past Medical History:   Diagnosis Date   • Condition not found     Hernia   • Foot pain, right 2018   • Fracture of 5th metatarsal 2018   • Gall stones    • Heel pain    • Hot flashes 04/10/2014   • Impaired fasting glucose 2018   • Joint pain 2014   • Low back pain    • Lumbar pain with radiation down left leg 2015   • Macromastia    • Migraine headache    • Night sweats    • Obesity 2018   • Osteoporosis 2014   • Pain in thoracic spine 04/10/2014   • Sinus trouble    • Vitamin D deficiency 04/10/2014      Family History   Problem Relation Age of Onset   • Breast cancer Mother    • Diabetes Mother    • Liver cancer Father    • Lung cancer Father    • Breast cancer Sister    • Diabetes Sister    • Colon cancer Paternal Aunt 67         age not listed   • Diabetes Paternal Aunt       Past Surgical History:   Procedure Laterality Date   •  SECTION     • CHOLECYSTECTOMY     • COLONOSCOPY  2016    5yr due    • ENDOSCOPY  2016   • HYSTERECTOMY     • INCONTINENCE SURGERY     • ROTATOR CUFF REPAIR      right   • VENTRAL HERNIA REPAIR          Current Outpatient Medications:   •  ibuprofen (ADVIL,MOTRIN) 800 MG tablet, ibuprofen 800 mg oral tablet take 1 tablet (800 mg) by oral route 3 times per day with food   Suspended, Disp: , Rfl:   •  metFORMIN (Glucophage) 1000 MG  "tablet, Take 1 tablet by mouth 2 (Two) Times a Day With Meals., Disp: 180 tablet, Rfl: 1  •  nitrofurantoin, macrocrystal-monohydrate, (Macrobid) 100 MG capsule, Take 1 capsule by mouth 2 (Two) Times a Day., Disp: 14 capsule, Rfl: 0  •  Trulicity 0.75 MG/0.5ML solution pen-injector, ADMINISTER 0.75 MG UNDER THE SKIN 1 TIME A WEEK AS DIRECTED, Disp: 2 mL, Rfl: 0  •  vitamin D (ERGOCALCIFEROL) 1.25 MG (00325 UT) capsule capsule, Take 1 capsule by mouth Every 7 (Seven) Days., Disp: 13 capsule, Rfl: 1    Current Facility-Administered Medications:   •  pneumococcal polysaccharide 23-valent (PNEUMOVAX-23) vaccine 0.5 mL, 0.5 mL, Intramuscular, During Hospitalization, Marcos Thomas PA    Objective   Vital Signs:     /75   Pulse 76   Ht 152.4 cm (60\")   Wt 98 kg (216 lb)   SpO2 96%   BMI 42.18 kg/m²    Estimated body mass index is 42.18 kg/m² as calculated from the following:    Height as of this encounter: 152.4 cm (60\").    Weight as of this encounter: 98 kg (216 lb).     Physical Exam  Vitals and nursing note reviewed.   Constitutional:       Appearance: Normal appearance. She is obese.   HENT:      Head: Normocephalic and atraumatic.   Cardiovascular:      Rate and Rhythm: Normal rate and regular rhythm.   Pulmonary:      Effort: Pulmonary effort is normal.      Breath sounds: Normal breath sounds.   Musculoskeletal:      Cervical back: Neck supple.   Neurological:      Mental Status: She is alert.   Psychiatric:         Mood and Affect: Mood normal.         Behavior: Behavior normal.        Result Review :     Common labs    Common Labsle 4/2/21 9/22/21 9/22/21 9/22/21 9/22/21 12/30/21 12/30/21 12/30/21     1507 1507 1507 1507 1147 1147 1246   Glucose     250 (A) 101 (A)     BUN     12 6 (A)     Creatinine     0.64 0.61     eGFR Non  Am     94 100     Sodium     135 (A) 138     Potassium     4.1 3.9     Chloride     101 105     Calcium     9.5 9.4     Albumin     4.10 4.40     Total Bilirubin     1.5 " (A) 1.2     Alkaline Phosphatase     65 45     AST (SGOT)     62 (A) 54 (A)     ALT (SGPT)     109 (A) 74 (A)     WBC  5.41         Hemoglobin  16.4 (A)         Hematocrit  48.2 (A)         Platelets  265         Total Cholesterol    246 (A)       Triglycerides    111       HDL Cholesterol    56       LDL Cholesterol     170 (A)       Hemoglobin A1C 9.9 (A)  9.69 (A)    6.21 (A)    Microalbumin, Urine        2.5   (A) Abnormal value       Comments are available for some flowsheets but are not being displayed.                         Assessment and Plan      Diagnoses and all orders for this visit:    1. Elevated LFTs (Primary)  Comments:  Cont GI, cont diet and wt loss    2. Controlled type 2 diabetes mellitus with hyperglycemia, without long-term current use of insulin (HCC)  Comments:  Pt hgba1c has improved since GLP1    3. Acute cystitis without hematuria  Comments:  Macrobid sent to pharmacy    4. Need for pneumococcal vaccination  -     pneumococcal polysaccharide 23-valent (PNEUMOVAX-23) vaccine 0.5 mL    5. Class 3 severe obesity due to excess calories with serious comorbidity and body mass index (BMI) of 40.0 to 44.9 in adult (HCC)  Comments:  Disc diet and exercise       Follow Up     Return in about 4 months (around 5/3/2022).    Patient was given instructions and counseling regarding her condition or for health maintenance advice. Please see specific information pulled into the AVS if appropriate.     I have reviewed information obtained and documented by others and I have confirmed the accuracy of this documented note.    JUDD Solis

## 2022-01-18 DIAGNOSIS — E11.65 UNCONTROLLED TYPE 2 DIABETES MELLITUS WITH HYPERGLYCEMIA: ICD-10-CM

## 2022-01-18 RX ORDER — DULAGLUTIDE 0.75 MG/.5ML
INJECTION, SOLUTION SUBCUTANEOUS
Qty: 2 ML | Refills: 0 | Status: SHIPPED | OUTPATIENT
Start: 2022-01-18 | End: 2022-02-21

## 2022-01-19 ENCOUNTER — HOSPITAL ENCOUNTER (OUTPATIENT)
Dept: BONE DENSITY | Facility: HOSPITAL | Age: 62
Discharge: HOME | End: 2022-01-19
Admitting: PHYSICIAN ASSISTANT

## 2022-01-19 PROCEDURE — 77080 DXA BONE DENSITY AXIAL: CPT

## 2022-01-20 ENCOUNTER — TELEPHONE (OUTPATIENT)
Dept: FAMILY MEDICINE CLINIC | Facility: CLINIC | Age: 62
End: 2022-01-20

## 2022-01-20 ENCOUNTER — PREP FOR SURGERY (OUTPATIENT)
Dept: OTHER | Facility: HOSPITAL | Age: 62
End: 2022-01-20

## 2022-01-20 DIAGNOSIS — N62 MACROMASTIA: Primary | ICD-10-CM

## 2022-01-20 RX ORDER — CEFAZOLIN SODIUM 2 G/100ML
2 INJECTION, SOLUTION INTRAVENOUS ONCE
Status: CANCELLED | OUTPATIENT
Start: 2022-01-20 | End: 2022-01-20

## 2022-01-20 NOTE — TELEPHONE ENCOUNTER
----- Message from JUDD Solis sent at 1/20/2022 10:19 AM EST -----  Your bone density study reveals that you have osteopenia.  This means that your bones are thinner, more brittle than normal.  There are two areas evaluated:  spine and hips.  What you need to do now is increase dietary or supplementation of Vitamin D and Calcium, increase weight bearing exercises and if you smoke, quit as soon as possible.  We should re-evaluate your bone density study (DEXA Scan) in 1-2 years to see if it is remaining stable or are you continuing to lose bone thickness which could develop into true osteoporosis.

## 2022-02-19 DIAGNOSIS — E11.65 UNCONTROLLED TYPE 2 DIABETES MELLITUS WITH HYPERGLYCEMIA: ICD-10-CM

## 2022-02-21 RX ORDER — DULAGLUTIDE 0.75 MG/.5ML
INJECTION, SOLUTION SUBCUTANEOUS
Qty: 2 ML | Refills: 0 | Status: SHIPPED | OUTPATIENT
Start: 2022-02-21 | End: 2022-03-21

## 2022-03-07 NOTE — PROGRESS NOTES
"Pre-op Exam (BBR WITH MESH )            History of Present Illness  Cassandra Maria is a 61 y.o. female who presents to [facility] for Pre-Operative Examination for Breast Reduction scheduled 4/1/22          Subjective        Patient has no known allergies.  Allergies Reconciled.    Review of Systems   All review of system has been reviewed and it  is negative except the ones note above.     Objective     /72 (BP Location: Left arm, Patient Position: Sitting)   Pulse 79   Temp 97.8 °F (36.6 °C)   Ht 152.4 cm (60\")   Wt 98 kg (216 lb)   SpO2 98%   BMI 42.18 kg/m²     Body mass index is 42.18 kg/m².    Physical Exam    Result Review :   The following data was reviewed by: Daniele Walker MD on 03/09/2022:             Assessment and Plan      Diagnoses and all orders for this visit:    1. Macromastia (Primary)  -     oxyCODONE-acetaminophen (Percocet) 5-325 MG per tablet; Take 1 tablet by mouth Every 6 (Six) Hours As Needed for Moderate Pain .  Dispense: 30 tablet; Refill: 0    Other orders  -     promethazine (PHENERGAN) 12.5 MG tablet; Take 1 tablet by mouth Every 6 (Six) Hours As Needed for Nausea or Vomiting.  Dispense: 20 tablet; Refill: 0  -     cephalexin (KEFLEX) 500 MG capsule; Take 1 capsule by mouth 4 (Four) Times a Day for 3 days.  Dispense: 12 capsule; Refill: 0        Plan:  • Given these options, the patient has verbally expressed an understanding of the risks of surgery and finds these risks acceptable. We will proceed with surgery as soon as possible.  • Covid Immunization status: up to date.   • Medications sent to pharmacy    Follow Up     No follow-ups on file.    Patient was given instructions and counseling regarding her condition. Please see specific information pulled into the AVS if appropriate.     Daniele Walker MD  03/09/2022  "

## 2022-03-09 ENCOUNTER — OFFICE VISIT (OUTPATIENT)
Dept: PLASTIC SURGERY | Facility: CLINIC | Age: 62
End: 2022-03-09

## 2022-03-09 VITALS
HEART RATE: 79 BPM | HEIGHT: 60 IN | WEIGHT: 216 LBS | TEMPERATURE: 97.8 F | BODY MASS INDEX: 42.41 KG/M2 | OXYGEN SATURATION: 98 % | DIASTOLIC BLOOD PRESSURE: 72 MMHG | SYSTOLIC BLOOD PRESSURE: 158 MMHG

## 2022-03-09 DIAGNOSIS — N62 MACROMASTIA: Primary | ICD-10-CM

## 2022-03-09 PROCEDURE — 99213 OFFICE O/P EST LOW 20 MIN: CPT | Performed by: SURGERY

## 2022-03-09 RX ORDER — CEPHALEXIN 500 MG/1
500 CAPSULE ORAL 4 TIMES DAILY
Qty: 12 CAPSULE | Refills: 0 | Status: SHIPPED | OUTPATIENT
Start: 2022-03-09 | End: 2022-03-12

## 2022-03-09 RX ORDER — OXYCODONE HYDROCHLORIDE AND ACETAMINOPHEN 5; 325 MG/1; MG/1
1 TABLET ORAL EVERY 6 HOURS PRN
Qty: 30 TABLET | Refills: 0 | Status: SHIPPED | OUTPATIENT
Start: 2022-03-09 | End: 2022-05-03

## 2022-03-09 RX ORDER — PROMETHAZINE HYDROCHLORIDE 12.5 MG/1
12.5 TABLET ORAL EVERY 6 HOURS PRN
Qty: 20 TABLET | Refills: 0 | Status: SHIPPED | OUTPATIENT
Start: 2022-03-09 | End: 2022-05-03

## 2022-03-21 DIAGNOSIS — E11.65 UNCONTROLLED TYPE 2 DIABETES MELLITUS WITH HYPERGLYCEMIA: ICD-10-CM

## 2022-03-21 RX ORDER — DULAGLUTIDE 0.75 MG/.5ML
INJECTION, SOLUTION SUBCUTANEOUS
Qty: 2 ML | Refills: 0 | Status: SHIPPED | OUTPATIENT
Start: 2022-03-21 | End: 2022-04-18

## 2022-03-27 DIAGNOSIS — E11.65 UNCONTROLLED TYPE 2 DIABETES MELLITUS WITH HYPERGLYCEMIA: ICD-10-CM

## 2022-04-01 ENCOUNTER — HOSPITAL ENCOUNTER (OUTPATIENT)
Facility: HOSPITAL | Age: 62
Setting detail: HOSPITAL OUTPATIENT SURGERY
Discharge: HOME | End: 2022-04-01
Attending: SURGERY | Admitting: SURGERY

## 2022-04-01 ENCOUNTER — ANESTHESIA (OUTPATIENT)
Dept: PERIOP | Facility: HOSPITAL | Age: 62
End: 2022-04-01

## 2022-04-01 ENCOUNTER — ANESTHESIA EVENT (OUTPATIENT)
Dept: PERIOP | Facility: HOSPITAL | Age: 62
End: 2022-04-01

## 2022-04-01 VITALS
WEIGHT: 206.79 LBS | HEART RATE: 76 BPM | OXYGEN SATURATION: 95 % | SYSTOLIC BLOOD PRESSURE: 107 MMHG | TEMPERATURE: 97.4 F | HEIGHT: 60 IN | RESPIRATION RATE: 16 BRPM | DIASTOLIC BLOOD PRESSURE: 68 MMHG | BODY MASS INDEX: 40.6 KG/M2

## 2022-04-01 DIAGNOSIS — N62 MACROMASTIA: ICD-10-CM

## 2022-04-01 LAB
ANION GAP SERPL CALCULATED.3IONS-SCNC: 11.8 MMOL/L (ref 5–15)
BUN SERPL-MCNC: 8 MG/DL (ref 8–23)
BUN/CREAT SERPL: 13.3 (ref 7–25)
CALCIUM SPEC-SCNC: 9.6 MG/DL (ref 8.6–10.5)
CHLORIDE SERPL-SCNC: 107 MMOL/L (ref 98–107)
CO2 SERPL-SCNC: 22.2 MMOL/L (ref 22–29)
CREAT SERPL-MCNC: 0.6 MG/DL (ref 0.57–1)
EGFRCR SERPLBLD CKD-EPI 2021: 102.3 ML/MIN/1.73
GLUCOSE BLDC GLUCOMTR-MCNC: 176 MG/DL (ref 70–99)
GLUCOSE SERPL-MCNC: 119 MG/DL (ref 65–99)
POTASSIUM SERPL-SCNC: 4.4 MMOL/L (ref 3.5–5.2)
QT INTERVAL: 379 MS
SODIUM SERPL-SCNC: 141 MMOL/L (ref 136–145)

## 2022-04-01 PROCEDURE — 25010000002 HYDROMORPHONE 1 MG/ML SOLUTION: Performed by: NURSE ANESTHETIST, CERTIFIED REGISTERED

## 2022-04-01 PROCEDURE — 25010000002 PROPOFOL 10 MG/ML EMULSION: Performed by: NURSE ANESTHETIST, CERTIFIED REGISTERED

## 2022-04-01 PROCEDURE — 25010000002 FENTANYL CITRATE (PF) 50 MCG/ML SOLUTION: Performed by: NURSE ANESTHETIST, CERTIFIED REGISTERED

## 2022-04-01 PROCEDURE — 25010000002 MIDAZOLAM PER 1 MG: Performed by: ANESTHESIOLOGY

## 2022-04-01 PROCEDURE — 25010000002 DEXAMETHASONE PER 1 MG: Performed by: NURSE ANESTHETIST, CERTIFIED REGISTERED

## 2022-04-01 PROCEDURE — 25010000002 ONDANSETRON PER 1 MG: Performed by: NURSE ANESTHETIST, CERTIFIED REGISTERED

## 2022-04-01 PROCEDURE — 88305 TISSUE EXAM BY PATHOLOGIST: CPT | Performed by: SURGERY

## 2022-04-01 PROCEDURE — 19318 BREAST REDUCTION: CPT | Performed by: SURGERY

## 2022-04-01 PROCEDURE — 80048 BASIC METABOLIC PNL TOTAL CA: CPT | Performed by: ANESTHESIOLOGY

## 2022-04-01 PROCEDURE — 25010000002 CEFAZOLIN IN DEXTROSE 2-4 GM/100ML-% SOLUTION: Performed by: SURGERY

## 2022-04-01 PROCEDURE — 93010 ELECTROCARDIOGRAM REPORT: CPT | Performed by: INTERNAL MEDICINE

## 2022-04-01 PROCEDURE — 82962 GLUCOSE BLOOD TEST: CPT

## 2022-04-01 PROCEDURE — 93005 ELECTROCARDIOGRAM TRACING: CPT | Performed by: ANESTHESIOLOGY

## 2022-04-01 RX ORDER — GLYCOPYRROLATE 0.2 MG/ML
0.2 INJECTION INTRAMUSCULAR; INTRAVENOUS
Status: COMPLETED | OUTPATIENT
Start: 2022-04-01 | End: 2022-04-01

## 2022-04-01 RX ORDER — ONDANSETRON 2 MG/ML
INJECTION INTRAMUSCULAR; INTRAVENOUS AS NEEDED
Status: DISCONTINUED | OUTPATIENT
Start: 2022-04-01 | End: 2022-04-01 | Stop reason: SURG

## 2022-04-01 RX ORDER — PROMETHAZINE HYDROCHLORIDE 25 MG/1
25 SUPPOSITORY RECTAL ONCE AS NEEDED
Status: DISCONTINUED | OUTPATIENT
Start: 2022-04-01 | End: 2022-04-01 | Stop reason: HOSPADM

## 2022-04-01 RX ORDER — LIDOCAINE HYDROCHLORIDE 20 MG/ML
INJECTION, SOLUTION INFILTRATION; PERINEURAL AS NEEDED
Status: DISCONTINUED | OUTPATIENT
Start: 2022-04-01 | End: 2022-04-01 | Stop reason: SURG

## 2022-04-01 RX ORDER — PROPOFOL 10 MG/ML
VIAL (ML) INTRAVENOUS AS NEEDED
Status: DISCONTINUED | OUTPATIENT
Start: 2022-04-01 | End: 2022-04-01 | Stop reason: SURG

## 2022-04-01 RX ORDER — ACETAMINOPHEN 500 MG
1000 TABLET ORAL ONCE
Status: COMPLETED | OUTPATIENT
Start: 2022-04-01 | End: 2022-04-01

## 2022-04-01 RX ORDER — SUCCINYLCHOLINE/SOD CL,ISO/PF 100 MG/5ML
SYRINGE (ML) INTRAVENOUS AS NEEDED
Status: DISCONTINUED | OUTPATIENT
Start: 2022-04-01 | End: 2022-04-01 | Stop reason: SURG

## 2022-04-01 RX ORDER — ONDANSETRON 2 MG/ML
4 INJECTION INTRAMUSCULAR; INTRAVENOUS ONCE AS NEEDED
Status: DISCONTINUED | OUTPATIENT
Start: 2022-04-01 | End: 2022-04-01 | Stop reason: HOSPADM

## 2022-04-01 RX ORDER — OXYCODONE HYDROCHLORIDE 5 MG/1
5 TABLET ORAL
Status: DISCONTINUED | OUTPATIENT
Start: 2022-04-01 | End: 2022-04-01 | Stop reason: HOSPADM

## 2022-04-01 RX ORDER — MAGNESIUM HYDROXIDE 1200 MG/15ML
LIQUID ORAL AS NEEDED
Status: DISCONTINUED | OUTPATIENT
Start: 2022-04-01 | End: 2022-04-01 | Stop reason: HOSPADM

## 2022-04-01 RX ORDER — DEXMEDETOMIDINE HYDROCHLORIDE 100 UG/ML
INJECTION, SOLUTION INTRAVENOUS AS NEEDED
Status: DISCONTINUED | OUTPATIENT
Start: 2022-04-01 | End: 2022-04-01 | Stop reason: SURG

## 2022-04-01 RX ORDER — CEFAZOLIN SODIUM 2 G/100ML
2 INJECTION, SOLUTION INTRAVENOUS ONCE
Status: COMPLETED | OUTPATIENT
Start: 2022-04-01 | End: 2022-04-01

## 2022-04-01 RX ORDER — MIDAZOLAM HYDROCHLORIDE 1 MG/ML
2 INJECTION INTRAMUSCULAR; INTRAVENOUS ONCE
Status: COMPLETED | OUTPATIENT
Start: 2022-04-01 | End: 2022-04-01

## 2022-04-01 RX ORDER — ROCURONIUM BROMIDE 10 MG/ML
INJECTION, SOLUTION INTRAVENOUS AS NEEDED
Status: DISCONTINUED | OUTPATIENT
Start: 2022-04-01 | End: 2022-04-01 | Stop reason: SURG

## 2022-04-01 RX ORDER — MEPERIDINE HYDROCHLORIDE 25 MG/ML
12.5 INJECTION INTRAMUSCULAR; INTRAVENOUS; SUBCUTANEOUS
Status: DISCONTINUED | OUTPATIENT
Start: 2022-04-01 | End: 2022-04-01 | Stop reason: HOSPADM

## 2022-04-01 RX ORDER — SODIUM CHLORIDE, SODIUM LACTATE, POTASSIUM CHLORIDE, CALCIUM CHLORIDE 600; 310; 30; 20 MG/100ML; MG/100ML; MG/100ML; MG/100ML
9 INJECTION, SOLUTION INTRAVENOUS CONTINUOUS PRN
Status: DISCONTINUED | OUTPATIENT
Start: 2022-04-01 | End: 2022-04-01 | Stop reason: HOSPADM

## 2022-04-01 RX ORDER — PROMETHAZINE HYDROCHLORIDE 12.5 MG/1
25 TABLET ORAL ONCE AS NEEDED
Status: DISCONTINUED | OUTPATIENT
Start: 2022-04-01 | End: 2022-04-01 | Stop reason: HOSPADM

## 2022-04-01 RX ORDER — DEXAMETHASONE SODIUM PHOSPHATE 4 MG/ML
INJECTION, SOLUTION INTRA-ARTICULAR; INTRALESIONAL; INTRAMUSCULAR; INTRAVENOUS; SOFT TISSUE AS NEEDED
Status: DISCONTINUED | OUTPATIENT
Start: 2022-04-01 | End: 2022-04-01 | Stop reason: SURG

## 2022-04-01 RX ORDER — FENTANYL CITRATE 50 UG/ML
INJECTION, SOLUTION INTRAMUSCULAR; INTRAVENOUS AS NEEDED
Status: DISCONTINUED | OUTPATIENT
Start: 2022-04-01 | End: 2022-04-01 | Stop reason: SURG

## 2022-04-01 RX ORDER — KETAMINE HYDROCHLORIDE 50 MG/ML
INJECTION, SOLUTION, CONCENTRATE INTRAMUSCULAR; INTRAVENOUS AS NEEDED
Status: DISCONTINUED | OUTPATIENT
Start: 2022-04-01 | End: 2022-04-01 | Stop reason: SURG

## 2022-04-01 RX ADMIN — MIDAZOLAM HYDROCHLORIDE 2 MG: 1 INJECTION, SOLUTION INTRAMUSCULAR; INTRAVENOUS at 11:49

## 2022-04-01 RX ADMIN — SUGAMMADEX 400 MG: 100 INJECTION, SOLUTION INTRAVENOUS at 14:16

## 2022-04-01 RX ADMIN — ROCURONIUM BROMIDE 25 MG: 10 INJECTION INTRAVENOUS at 12:41

## 2022-04-01 RX ADMIN — KETAMINE HYDROCHLORIDE 20 MG: 50 INJECTION, SOLUTION INTRAMUSCULAR; INTRAVENOUS at 12:05

## 2022-04-01 RX ADMIN — DEXMEDETOMIDINE HYDROCHLORIDE 10 MCG: 100 INJECTION, SOLUTION INTRAVENOUS at 13:34

## 2022-04-01 RX ADMIN — ROCURONIUM BROMIDE 25 MG: 10 INJECTION INTRAVENOUS at 13:35

## 2022-04-01 RX ADMIN — ROCURONIUM BROMIDE 50 MG: 10 INJECTION INTRAVENOUS at 12:25

## 2022-04-01 RX ADMIN — FENTANYL CITRATE 50 MCG: 50 INJECTION, SOLUTION INTRAMUSCULAR; INTRAVENOUS at 12:03

## 2022-04-01 RX ADMIN — SODIUM CHLORIDE, POTASSIUM CHLORIDE, SODIUM LACTATE AND CALCIUM CHLORIDE 9 ML/HR: 600; 310; 30; 20 INJECTION, SOLUTION INTRAVENOUS at 11:34

## 2022-04-01 RX ADMIN — HYDROMORPHONE HYDROCHLORIDE 0.5 MG: 1 INJECTION, SOLUTION INTRAMUSCULAR; INTRAVENOUS; SUBCUTANEOUS at 15:03

## 2022-04-01 RX ADMIN — PROPOFOL 180 MG: 10 INJECTION, EMULSION INTRAVENOUS at 12:05

## 2022-04-01 RX ADMIN — LIDOCAINE HYDROCHLORIDE 100 MG: 20 INJECTION, SOLUTION INFILTRATION; PERINEURAL at 12:05

## 2022-04-01 RX ADMIN — ROCURONIUM BROMIDE 10 MG: 10 INJECTION INTRAVENOUS at 13:57

## 2022-04-01 RX ADMIN — ONDANSETRON 4 MG: 2 INJECTION INTRAMUSCULAR; INTRAVENOUS at 14:16

## 2022-04-01 RX ADMIN — CEFAZOLIN SODIUM 2 G: 2 INJECTION, SOLUTION INTRAVENOUS at 12:00

## 2022-04-01 RX ADMIN — DEXMEDETOMIDINE HYDROCHLORIDE 40 MCG: 100 INJECTION, SOLUTION INTRAVENOUS at 12:07

## 2022-04-01 RX ADMIN — PROPOFOL 250 MCG/KG/MIN: 10 INJECTION, EMULSION INTRAVENOUS at 12:05

## 2022-04-01 RX ADMIN — FENTANYL CITRATE 50 MCG: 50 INJECTION, SOLUTION INTRAMUSCULAR; INTRAVENOUS at 12:15

## 2022-04-01 RX ADMIN — ACETAMINOPHEN 1000 MG: 500 TABLET ORAL at 11:34

## 2022-04-01 RX ADMIN — GLYCOPYRROLATE 0.2 MG: 0.2 INJECTION INTRAMUSCULAR; INTRAVENOUS at 11:49

## 2022-04-01 RX ADMIN — Medication 100 MG: at 12:05

## 2022-04-01 RX ADMIN — OXYCODONE HYDROCHLORIDE 5 MG: 5 TABLET ORAL at 15:07

## 2022-04-01 RX ADMIN — DEXAMETHASONE SODIUM PHOSPHATE 4 MG: 4 INJECTION INTRA-ARTICULAR; INTRALESIONAL; INTRAMUSCULAR; INTRAVENOUS; SOFT TISSUE at 12:05

## 2022-04-01 RX ADMIN — ROCURONIUM BROMIDE 25 MG: 10 INJECTION INTRAVENOUS at 13:13

## 2022-04-01 NOTE — INTERVAL H&P NOTE
H&P reviewed. The patient was examined and there are no changes to the H&P.    Patient is not tachycardic  Respirations are non elaborated  Vitals:    04/01/22 1108   BP: 140/71   Pulse: 82   Resp: 20   Temp: 97.9 °F (36.6 °C)   SpO2: 98%     Risks and benefits reminded to patient who agrees to proceed

## 2022-04-01 NOTE — DISCHARGE INSTRUCTIONS
DISCHARGE INSTRUCTIONS  SURGICAL / AMBULATORY  PROCEDURES      For your surgery you had:  General anesthesia (you may have a sore throat for the first 24 hours)  IV sedation.  Local anesthesia      You may experience dizziness, drowsiness, or light-headedness for several hours following surgery/procedure.  Do not stay alone today or tonight.  Limit your activity for 24 hours.  Resume your diet slowly.  Follow whatever special dietary instructions you may have been given by your doctor.  You should not drive or operate machinery, drink alcohol, or sign legally binding documents for 24 hours or while you are taking pain medication.  Last dose of pain medication was given at:  Tylenol at 1130 am preop  .  NOTIFY YOUR DOCTOR IF YOU EXPERIENCE ANY OF THE FOLLOWING:  Temperature greater than 101 degrees Fahrenheit  Shaking Chills  Redness or excessive drainage from incision  Nausea, vomiting and/or pain that is not controlled by prescribed medications  Increase in bleeding or bleeding that is excessive  Unable to urinate in 6 hours after surgery  If unable to reach your doctor, please go to the closest Emergency Room  You may begin dressing changes:     [x] in 24 hours   [] in 48 hours   [] Other:    You may remove dressing tomorrow.  You may shower   .    Medications per physician instructions as indicated on Discharge Medication Information Sheet.  You should see   for follow-up care   on   .  Phone number:       SPECIAL INSTRUCTIONS:           Sleep in an upright position  No exercising , no driving x2 weeks   May shower tomorrow after removing bandages .  Dry dressings as needed daily.  No bra x1 week then soft bra may be worn   Only raise arms to shoulder level to comb hair .

## 2022-04-01 NOTE — ANESTHESIA PREPROCEDURE EVALUATION
Anesthesia Evaluation     Patient summary reviewed and Nursing notes reviewed                Airway   Mallampati: I  TM distance: >3 FB  Neck ROM: full  No difficulty expected  Dental      Pulmonary - negative pulmonary ROS and normal exam    breath sounds clear to auscultation  Cardiovascular - negative cardio ROS and normal exam    Rhythm: regular  Rate: normal        Neuro/Psych- negative ROS  GI/Hepatic/Renal/Endo    (+) morbid obesity,  diabetes mellitus,     Musculoskeletal (-) negative ROS    Abdominal    Substance History - negative use     OB/GYN negative ob/gyn ROS         Other                        Anesthesia Plan    ASA 3     general     intravenous induction     Anesthetic plan, all risks, benefits, and alternatives have been provided, discussed and informed consent has been obtained with: patient.        CODE STATUS:

## 2022-04-01 NOTE — ANESTHESIA POSTPROCEDURE EVALUATION
Patient: Cassandra Maria    Procedure Summary     Date: 04/01/22 Room / Location: [facility] OR  / [facility]    Anesthesia Start: 1158 Anesthesia Stop: 1433    Procedure: BREAST REDUCTION BILATERAL (Bilateral Breast) Diagnosis:       Macromastia      (Macromastia [N62])    Surgeons: Daniele Walker MD Provider: Terrance Saldaña MD    Anesthesia Type: general ASA Status: 3          Anesthesia Type: general    Vitals  Vitals Value Taken Time   /62 04/01/22 1506   Temp     Pulse 76 04/01/22 1507   Resp     SpO2 99 % 04/01/22 1507   Vitals shown include unvalidated device data.        Post Anesthesia Care and Evaluation    Patient location during evaluation: bedside  Patient participation: complete - patient participated  Level of consciousness: awake  Pain management: adequate  Airway patency: patent  Anesthetic complications: No anesthetic complications  PONV Status: none  Cardiovascular status: acceptable  Respiratory status: acceptable  Hydration status: acceptable    Comments: An Anesthesiologist personally participated in the most demanding procedures (including induction and emergence if applicable) in the anesthesia plan, monitored the course of anesthesia administration at frequent intervals and remained physically present and available for immediate diagnosis and treatment of emergencies.

## 2022-04-01 NOTE — OP NOTE
Plastic Surgery Op Note    BREAST REDUCTION BILATERAL    Cassandra Maria  4/1/2022    Pre-op Diagnosis:   Macromastia [N62]    Post-Op Diagnosis Codes:     * Macromastia [N62]    Anesthesia: General    Staff:   Circulator: Patricia Priest RN; Mercedes Hull RN  Scrub Person: Elias Amezquita    Surgeon: Dr Walker    Estimated Blood Loss: minimal    Specimens:   Order Name Source Comment Collection Info Order Time   BASIC METABOLIC PANEL   Collected By: Virgie Darden RN 4/1/2022 10:58 AM     Release to patient   Immediate        TISSUE PATHOLOGY EXAM Breast, Right  Collected By: Daniele Walker MD 4/1/2022 12:43 PM     Release to patient   Immediate              Drains: * No LDAs found *    Findings:   Breast reduction weight:  R: 595g  L:655g    After risks and benefits were discussed with patient and inform consent was signed, patient was taken to the OR and positioned supine. Then patient was anesthetized using general anesthesia and the neck, chest and upper abdomen were prepped in a sterile fashion way. A time out confirming the procedure to be performed and patient's name was performed. The whole team was introduced by name.        Pre op markings were done and consisted of a 21 cm distance between the sternal notch to nipple, 4.2 cm final nipple diameter and 5 cm vertical line.       I started the procedure on the right where we place a mamostat at the base of the breast and an areolar cookie cutter of 4.2 cm was used to yunior the areola. The periareolar skin was incised and the dermis was de- epithelized until the borders of the pre op markings. The nipple blood supply was based on an inferior pedicle. In the lower pole of the breast,the inferior based rectangular flap of 10x13 cm  was designed centered 10 cm from the midline. This flap was de epithelized and the lateral borders incised until the chest wall, the inferior border was kept connected to the dermis . Then the upper breast was   from the chest wall until the 2nd intercostal space. The inferior breast flap was then superiorly advanced and fixed to the 2nd intercostal with 2 interrupted 3-0 vicryl stitches to help superior and center projection of the breast. Then we resected the excessive breast tissue from the middle and lateral portion of the breast leaving a 2 cm thickness of breast tissue connected to the cutaneous flaps. We then united the vertical pre op  markings and start to tailor the breast. Additional breast and cutaneous tissue was resected from the medial and lateral portion of the breast. The nipple was rotated superiorly and placed at the level of the pre op markings. The incisions were closed with 3-0 vicryl followed by 4-0 monocryl. A sterile dressing was placed to cover the surgical incisions. A mirror procedure was done to the contralateral side.    At the end of the procedure, all instruments were checked. There were no complications and I was present for the entire procedure    Daniele Walker MD  04/01/22  14:17 EDT

## 2022-04-05 LAB
CYTO UR: NORMAL
LAB AP CASE REPORT: NORMAL
LAB AP CLINICAL INFORMATION: NORMAL
PATH REPORT.FINAL DX SPEC: NORMAL
PATH REPORT.GROSS SPEC: NORMAL

## 2022-04-06 NOTE — PROGRESS NOTES
"  Post-Op BBR    Subjective            History of Present Illness  Cassandra Maria is a 61 y.o. female who presents to [facility] as Postoperative Follow-Up from bilateral breast reduction. No concerns, she has noticed a little bit of clear drainage from bottom of left breast but not much. No pain, redness or swelling.     Allergies: Patient has no known allergies.  Allergies Reconciled.    Review of Systems   All review of system has been reviewed and it  is negative except the ones note above.     Objective     /75 (BP Location: Left arm)   Pulse 79   Temp 97.5 °F (36.4 °C)   Ht 152.4 cm (60\")   Wt 93 kg (205 lb)   SpO2 98%   BMI 40.04 kg/m²     Body mass index is 40.04 kg/m².    Physical Exam    Cardiovascular: Normal rate    Pulmonary/Chest: Effort normal    Breast: Staples in place, breast soft with scattered firm areas bilaterally, good symmetry, expected ecchymosis and edema, nipples viable                                                              Result Review :                Assessment and Plan      Diagnoses and all orders for this visit:    1. Postoperative follow-up (Primary)        Plan:  • May shower but dry incisions well, bra as tolerated, no heavy lifting, limit use of upper body, wean pain meds, follow up one week for possible removal of every other staple    Follow Up     Return in about 1 week (around 4/18/2022) for post op BBR/? suture removal.    Patient was given instructions and counseling regarding her condition. Please see specific information pulled into the AVS if appropriate.     Tova Childs, GENESIS  04/11/2022  "

## 2022-04-11 ENCOUNTER — OFFICE VISIT (OUTPATIENT)
Dept: PLASTIC SURGERY | Facility: CLINIC | Age: 62
End: 2022-04-11

## 2022-04-11 VITALS
TEMPERATURE: 97.5 F | BODY MASS INDEX: 40.25 KG/M2 | HEIGHT: 60 IN | SYSTOLIC BLOOD PRESSURE: 130 MMHG | WEIGHT: 205 LBS | HEART RATE: 79 BPM | OXYGEN SATURATION: 98 % | DIASTOLIC BLOOD PRESSURE: 75 MMHG

## 2022-04-11 DIAGNOSIS — Z09 POSTOPERATIVE FOLLOW-UP: Primary | ICD-10-CM

## 2022-04-11 PROCEDURE — 99024 POSTOP FOLLOW-UP VISIT: CPT | Performed by: NURSE PRACTITIONER

## 2022-04-14 NOTE — PROGRESS NOTES
"  Post-Op BBR    Subjective            History of Present Illness  Cassandra Maria is a 61 y.o. female who presents to [facility]ERY as Postoperative Follow-Up from bilateral breast reduction.    She states she has sharp pains on & off but not often that comes from under her arms across the breasts. She also states that both breast are hard and very sensitive. Denies drainage, fever, redness or warmth.     Allergies: Patient has no known allergies.  Allergies Reconciled.    Review of Systems   All review of system has been reviewed and it  is negative except the ones note above.     Objective     /82 (BP Location: Left arm)   Pulse 91   Temp 98.4 °F (36.9 °C) (Temporal)   Ht 152.4 cm (60\")   Wt 93 kg (205 lb)   SpO2 96%   BMI 40.04 kg/m²     Body mass index is 40.04 kg/m².    Physical Exam    Cardiovascular: Normal rate    Pulmonary/Chest: Effort normal    Breast: Staples in place, breast soft with scattered firm areas bilaterally, good symmetry, expected ecchymosis and edema, nipples viable                                                              Result Review :                Assessment and Plan      Diagnoses and all orders for this visit:    1. Postoperative follow-up (Primary)        Plan:  • Removed staples and applied steri-strips. Healing well. Had suture exposed left breast but removed it. Do not shower x 48 hours then shower and dry strips well. Limit activity of arms. May wear sports bra as tolerated. RTC for any complications.     Follow Up     Return for 1m post op/BBR.    Patient was given instructions and counseling regarding her condition. Please see specific information pulled into the AVS if appropriate.     Tova Childs, APRN  04/19/2022  "

## 2022-04-17 DIAGNOSIS — E11.65 UNCONTROLLED TYPE 2 DIABETES MELLITUS WITH HYPERGLYCEMIA: ICD-10-CM

## 2022-04-18 RX ORDER — DULAGLUTIDE 0.75 MG/.5ML
INJECTION, SOLUTION SUBCUTANEOUS
Qty: 2 ML | Refills: 0 | Status: SHIPPED | OUTPATIENT
Start: 2022-04-18 | End: 2022-05-04

## 2022-04-18 RX ORDER — DULAGLUTIDE 0.75 MG/.5ML
INJECTION, SOLUTION SUBCUTANEOUS
Qty: 2 ML | Refills: 0 | Status: SHIPPED | OUTPATIENT
Start: 2022-04-18 | End: 2022-05-03

## 2022-04-19 ENCOUNTER — OFFICE VISIT (OUTPATIENT)
Dept: PLASTIC SURGERY | Facility: CLINIC | Age: 62
End: 2022-04-19

## 2022-04-19 VITALS
TEMPERATURE: 98.4 F | OXYGEN SATURATION: 96 % | BODY MASS INDEX: 40.25 KG/M2 | HEIGHT: 60 IN | SYSTOLIC BLOOD PRESSURE: 162 MMHG | WEIGHT: 205 LBS | DIASTOLIC BLOOD PRESSURE: 82 MMHG | HEART RATE: 91 BPM

## 2022-04-19 DIAGNOSIS — Z09 POSTOPERATIVE FOLLOW-UP: Primary | ICD-10-CM

## 2022-04-19 PROCEDURE — 99024 POSTOP FOLLOW-UP VISIT: CPT | Performed by: NURSE PRACTITIONER

## 2022-05-03 ENCOUNTER — OFFICE VISIT (OUTPATIENT)
Dept: PLASTIC SURGERY | Facility: CLINIC | Age: 62
End: 2022-05-03

## 2022-05-03 ENCOUNTER — OFFICE VISIT (OUTPATIENT)
Dept: FAMILY MEDICINE CLINIC | Facility: CLINIC | Age: 62
End: 2022-05-03

## 2022-05-03 ENCOUNTER — TELEPHONE (OUTPATIENT)
Dept: FAMILY MEDICINE CLINIC | Facility: CLINIC | Age: 62
End: 2022-05-03

## 2022-05-03 VITALS
BODY MASS INDEX: 40.43 KG/M2 | OXYGEN SATURATION: 98 % | HEART RATE: 80 BPM | HEIGHT: 60 IN | TEMPERATURE: 98.2 F | SYSTOLIC BLOOD PRESSURE: 114 MMHG | DIASTOLIC BLOOD PRESSURE: 77 MMHG

## 2022-05-03 VITALS
SYSTOLIC BLOOD PRESSURE: 126 MMHG | BODY MASS INDEX: 40.64 KG/M2 | HEIGHT: 60 IN | HEART RATE: 79 BPM | WEIGHT: 207 LBS | DIASTOLIC BLOOD PRESSURE: 49 MMHG | OXYGEN SATURATION: 97 %

## 2022-05-03 DIAGNOSIS — Z09 POSTOPERATIVE FOLLOW-UP: Primary | ICD-10-CM

## 2022-05-03 DIAGNOSIS — E11.65 CONTROLLED TYPE 2 DIABETES MELLITUS WITH HYPERGLYCEMIA, WITHOUT LONG-TERM CURRENT USE OF INSULIN: ICD-10-CM

## 2022-05-03 DIAGNOSIS — E78.5 HYPERLIPIDEMIA, UNSPECIFIED HYPERLIPIDEMIA TYPE: Primary | ICD-10-CM

## 2022-05-03 DIAGNOSIS — M54.9 BACK PAIN, UNSPECIFIED BACK LOCATION, UNSPECIFIED BACK PAIN LATERALITY, UNSPECIFIED CHRONICITY: ICD-10-CM

## 2022-05-03 DIAGNOSIS — E55.9 VITAMIN D DEFICIENCY: ICD-10-CM

## 2022-05-03 DIAGNOSIS — R53.83 FATIGUE, UNSPECIFIED TYPE: ICD-10-CM

## 2022-05-03 DIAGNOSIS — Z13.89 SCREENING FOR BLOOD OR PROTEIN IN URINE: ICD-10-CM

## 2022-05-03 PROBLEM — H25.819 COMBINED FORM OF SENILE CATARACT: Status: ACTIVE | Noted: 2021-07-22

## 2022-05-03 PROCEDURE — 99213 OFFICE O/P EST LOW 20 MIN: CPT | Performed by: PHYSICIAN ASSISTANT

## 2022-05-03 PROCEDURE — 99024 POSTOP FOLLOW-UP VISIT: CPT | Performed by: NURSE PRACTITIONER

## 2022-05-03 RX ORDER — IBUPROFEN 800 MG/1
800 TABLET ORAL EVERY 8 HOURS PRN
Qty: 60 TABLET | Refills: 1 | Status: SHIPPED | OUTPATIENT
Start: 2022-05-03 | End: 2022-08-30

## 2022-05-03 NOTE — PROGRESS NOTES
Chief Complaint  Hyperlipidemia (4 month follow up), Vitamin D Deficiency, and Migraine    Subjective          Cassandra Maria presents to [facility]  History of Present Illness   Pt presents today for 4 month follow up.    Pt states on 22 she had a breast reduction. Pt states she has a f/u today w/Fensterer. Pt states she is doing well since surgery no issues with healing. Pt states she is still under restrictions.     Pt states no new issues or concerns to discuss today.    Pt states she monitors bs; runs between .    Labs 21  A1C 6.21      Pt is feeling great since her breast reduction.    Past Medical History:   Diagnosis Date   • Condition not found     Hernia   • Diabetes mellitus (HCC)     HAD ELEVATED A1C- ON MEDS- BG RUNS AROUND  IN AM   • Foot pain, right 2018   • Fracture of 5th metatarsal 2018   • Gall stones    • Heel pain    • Hot flashes 04/10/2014   • Impaired fasting glucose 2018   • Joint pain 2014   • Low back pain    • Lumbar pain with radiation down left leg 2015   • Macromastia    • Migraine headache    • Night sweats    • Obesity 2018   • Osteoporosis 2014   • Pain in thoracic spine 04/10/2014   • Sinus trouble    • Vitamin D deficiency 04/10/2014      Family History   Problem Relation Age of Onset   • Breast cancer Mother    • Diabetes Mother    • Liver cancer Father    • Lung cancer Father    • Breast cancer Sister    • Diabetes Sister    • Colon cancer Paternal Aunt 67         age not listed   • Diabetes Paternal Aunt    • Malig Hyperthermia Neg Hx       Past Surgical History:   Procedure Laterality Date   • BILATERAL BREAST REDUCTION Bilateral 2022    Procedure: BREAST REDUCTION BILATERAL;  Surgeon: Daniele Walker MD;  Location: [facility] OR [facility];  Service: Plastics;  Laterality: Bilateral;   •  SECTION     • CHOLECYSTECTOMY     • COLONOSCOPY  2016    5yr  "due 2021   • ENDOSCOPY  2016 2002   • HYSTERECTOMY     • INCONTINENCE SURGERY     • ROTATOR CUFF REPAIR      right   • VENTRAL HERNIA REPAIR          Current Outpatient Medications:   •  metFORMIN (GLUCOPHAGE) 1000 MG tablet, TAKE 1 TABLET BY MOUTH TWICE DAILY WITH MEALS (Patient taking differently: Take 1,000 mg by mouth 2 (Two) Times a Day With Meals. INSTRUCTED PER ANESTHESIA STANDING ORDERS), Disp: 180 tablet, Rfl: 1  •  Trulicity 0.75 MG/0.5ML solution pen-injector, ADMINISTER 0.75 MG UNDER THE SKIN 1 TIME A WEEK AS DIRECTED, Disp: 2 mL, Rfl: 0  •  vitamin D (ERGOCALCIFEROL) 1.25 MG (22140 UT) capsule capsule, Take 1 capsule by mouth Every 7 (Seven) Days., Disp: 13 capsule, Rfl: 1  •  ibuprofen (ADVIL,MOTRIN) 800 MG tablet, Take 1 tablet by mouth Every 8 (Eight) Hours As Needed for Mild Pain ., Disp: 60 tablet, Rfl: 1    Objective     Vital Signs:     /49 (BP Location: Right arm)   Pulse 79   Ht 152.4 cm (60\")   Wt 93.9 kg (207 lb)   SpO2 97%   BMI 40.43 kg/m²    Estimated body mass index is 40.43 kg/m² as calculated from the following:    Height as of this encounter: 152.4 cm (60\").    Weight as of this encounter: 93.9 kg (207 lb).     Wt Readings from Last 3 Encounters:   05/03/22 93.9 kg (207 lb)   04/19/22 93 kg (205 lb)   04/11/22 93 kg (205 lb)     BP Readings from Last 3 Encounters:   05/03/22 126/49   04/19/22 162/82   04/11/22 130/75     Physical Exam  Vitals and nursing note reviewed.   Constitutional:       Appearance: Normal appearance. She is obese.   HENT:      Head: Normocephalic and atraumatic.   Cardiovascular:      Rate and Rhythm: Normal rate and regular rhythm.      Heart sounds: Normal heart sounds.   Pulmonary:      Effort: Pulmonary effort is normal.      Breath sounds: Normal breath sounds.   Musculoskeletal:      Cervical back: Neck supple.   Neurological:      Mental Status: She is alert.   Psychiatric:         Mood and Affect: Mood normal.         Behavior: Behavior " normal.        Result Review :     Common labs    Common Labsle 9/22/21 9/22/21 9/22/21 9/22/21 12/30/21 12/30/21 12/30/21 4/1/22    1507 1507 1507 1507 1147 1147 1246    Glucose    250 (A) 101 (A)   119 (A)   BUN    12 6 (A)   8   Creatinine    0.64 0.61   0.60   eGFR Non African Am    94 100      Sodium    135 (A) 138   141   Potassium    4.1 3.9   4.4   Chloride    101 105   107   Calcium    9.5 9.4   9.6   Albumin    4.10 4.40      Total Bilirubin    1.5 (A) 1.2      Alkaline Phosphatase    65 45      AST (SGOT)    62 (A) 54 (A)      ALT (SGPT)    109 (A) 74 (A)      WBC 5.41          Hemoglobin 16.4 (A)          Hematocrit 48.2 (A)          Platelets 265          Total Cholesterol   246 (A)        Triglycerides   111        HDL Cholesterol   56        LDL Cholesterol    170 (A)        Hemoglobin A1C  9.69 (A)    6.21 (A)     Microalbumin, Urine       2.5    (A) Abnormal value       Comments are available for some flowsheets but are not being displayed.                    Patient Care Team:  Marcos Thomas PA as PCP - General (Physician Assistant)    Class 3 Severe Obesity (BMI >=40). Obesity-related health conditions include the following: diabetes mellitus and GERD. Obesity is improving with lifestyle modifications. BMI is is above average; BMI management plan is completed. We discussed portion control and increasing exercise.          Assessment and Plan      Diagnoses and all orders for this visit:    1. Hyperlipidemia, unspecified hyperlipidemia type (Primary)  -     Lipid Panel; Future  -     CBC & Differential; Future  -     Comprehensive Metabolic Panel; Future    2. Vitamin D deficiency  -     Vitamin D 25 Hydroxy; Future    3. Controlled type 2 diabetes mellitus with hyperglycemia, without long-term current use of insulin (HCC)  -     Hemoglobin A1c; Future    4. Screening for blood or protein in urine  -     Urinalysis With Culture If Indicated -; Future    5. Fatigue, unspecified type  -     TSH;  Future    6. Back pain, unspecified back location, unspecified back pain laterality, unspecified chronicity  -     ibuprofen (ADVIL,MOTRIN) 800 MG tablet; Take 1 tablet by mouth Every 8 (Eight) Hours As Needed for Mild Pain .  Dispense: 60 tablet; Refill: 1       Follow Up     Return in about 6 months (around 11/3/2022).    Patient was given instructions and counseling regarding her condition or for health maintenance advice. Please see specific information pulled into the AVS if appropriate.     I have reviewed information obtained and documented by others and I have confirmed the accuracy of this documented note.    JUDD Solis

## 2022-05-03 NOTE — TELEPHONE ENCOUNTER
Caller: Cassandra Maria    Relationship: Self    Best call back number: [phone]       What was the call regarding: PATIENT REPORTS THAT SHE WAS TOLD TO CALL BACK WHEN SHE WAS RELEASED FROM PLASTIC SURGERY - RELEASED TODAY 05/03 - REPORTS Marcos Thomas PA  WAS GOING TO INCREASE HER Trulicity     Do you require a callback: YES, TO CONFIRM DOSAGE INCREASE

## 2022-05-04 RX ORDER — DULAGLUTIDE 1.5 MG/.5ML
1.5 INJECTION, SOLUTION SUBCUTANEOUS WEEKLY
Qty: 2 ML | Refills: 1 | Status: SHIPPED | OUTPATIENT
Start: 2022-05-04 | End: 2022-06-27

## 2022-05-10 ENCOUNTER — TELEPHONE (OUTPATIENT)
Dept: FAMILY MEDICINE CLINIC | Facility: CLINIC | Age: 62
End: 2022-05-10

## 2022-05-11 ENCOUNTER — LAB (OUTPATIENT)
Dept: LAB | Facility: HOSPITAL | Age: 62
End: 2022-05-11

## 2022-05-11 ENCOUNTER — OFFICE VISIT (OUTPATIENT)
Dept: PLASTIC SURGERY | Facility: CLINIC | Age: 62
End: 2022-05-11

## 2022-05-11 VITALS
HEART RATE: 66 BPM | HEIGHT: 60 IN | TEMPERATURE: 97.5 F | SYSTOLIC BLOOD PRESSURE: 140 MMHG | DIASTOLIC BLOOD PRESSURE: 85 MMHG | OXYGEN SATURATION: 98 % | BODY MASS INDEX: 40.43 KG/M2

## 2022-05-11 DIAGNOSIS — E11.65 CONTROLLED TYPE 2 DIABETES MELLITUS WITH HYPERGLYCEMIA, WITHOUT LONG-TERM CURRENT USE OF INSULIN: ICD-10-CM

## 2022-05-11 DIAGNOSIS — Z09 POSTOPERATIVE FOLLOW-UP: Primary | ICD-10-CM

## 2022-05-11 DIAGNOSIS — Z13.89 SCREENING FOR BLOOD OR PROTEIN IN URINE: ICD-10-CM

## 2022-05-11 DIAGNOSIS — E78.5 HYPERLIPIDEMIA, UNSPECIFIED HYPERLIPIDEMIA TYPE: ICD-10-CM

## 2022-05-11 DIAGNOSIS — R53.83 FATIGUE, UNSPECIFIED TYPE: ICD-10-CM

## 2022-05-11 DIAGNOSIS — E55.9 VITAMIN D DEFICIENCY: ICD-10-CM

## 2022-05-11 LAB
25(OH)D3 SERPL-MCNC: 40.3 NG/ML (ref 30–100)
ALBUMIN SERPL-MCNC: 4.1 G/DL (ref 3.5–5.2)
ALBUMIN/GLOB SERPL: 1.6 G/DL
ALP SERPL-CCNC: 44 U/L (ref 39–117)
ALT SERPL W P-5'-P-CCNC: 29 U/L (ref 1–33)
ANION GAP SERPL CALCULATED.3IONS-SCNC: 12.3 MMOL/L (ref 5–15)
AST SERPL-CCNC: 26 U/L (ref 1–32)
BACTERIA UR QL AUTO: NORMAL /HPF
BASOPHILS # BLD AUTO: 0.07 10*3/MM3 (ref 0–0.2)
BASOPHILS NFR BLD AUTO: 1.2 % (ref 0–1.5)
BILIRUB SERPL-MCNC: 0.8 MG/DL (ref 0–1.2)
BILIRUB UR QL STRIP: NEGATIVE
BUN SERPL-MCNC: 13 MG/DL (ref 8–23)
BUN/CREAT SERPL: 21 (ref 7–25)
CALCIUM SPEC-SCNC: 9.9 MG/DL (ref 8.6–10.5)
CHLORIDE SERPL-SCNC: 103 MMOL/L (ref 98–107)
CHOLEST SERPL-MCNC: 225 MG/DL (ref 0–200)
CLARITY UR: CLEAR
CO2 SERPL-SCNC: 22.7 MMOL/L (ref 22–29)
COLOR UR: YELLOW
CREAT SERPL-MCNC: 0.62 MG/DL (ref 0.57–1)
DEPRECATED RDW RBC AUTO: 36.9 FL (ref 37–54)
EGFRCR SERPLBLD CKD-EPI 2021: 101.5 ML/MIN/1.73
EOSINOPHIL # BLD AUTO: 0.26 10*3/MM3 (ref 0–0.4)
EOSINOPHIL NFR BLD AUTO: 4.3 % (ref 0.3–6.2)
ERYTHROCYTE [DISTWIDTH] IN BLOOD BY AUTOMATED COUNT: 11.6 % (ref 12.3–15.4)
GLOBULIN UR ELPH-MCNC: 2.5 GM/DL
GLUCOSE SERPL-MCNC: 88 MG/DL (ref 65–99)
GLUCOSE UR STRIP-MCNC: NEGATIVE MG/DL
HBA1C MFR BLD: 5.3 % (ref 4.8–5.6)
HCT VFR BLD AUTO: 41 % (ref 34–46.6)
HDLC SERPL-MCNC: 65 MG/DL (ref 40–60)
HGB BLD-MCNC: 14.2 G/DL (ref 12–15.9)
HGB UR QL STRIP.AUTO: NEGATIVE
HYALINE CASTS UR QL AUTO: NORMAL /LPF
IMM GRANULOCYTES # BLD AUTO: 0.02 10*3/MM3 (ref 0–0.05)
IMM GRANULOCYTES NFR BLD AUTO: 0.3 % (ref 0–0.5)
KETONES UR QL STRIP: NEGATIVE
LDLC SERPL CALC-MCNC: 143 MG/DL (ref 0–100)
LDLC/HDLC SERPL: 2.16 {RATIO}
LEUKOCYTE ESTERASE UR QL STRIP.AUTO: ABNORMAL
LYMPHOCYTES # BLD AUTO: 1.55 10*3/MM3 (ref 0.7–3.1)
LYMPHOCYTES NFR BLD AUTO: 25.7 % (ref 19.6–45.3)
MCH RBC QN AUTO: 31 PG (ref 26.6–33)
MCHC RBC AUTO-ENTMCNC: 34.6 G/DL (ref 31.5–35.7)
MCV RBC AUTO: 89.5 FL (ref 79–97)
MONOCYTES # BLD AUTO: 0.57 10*3/MM3 (ref 0.1–0.9)
MONOCYTES NFR BLD AUTO: 9.5 % (ref 5–12)
NEUTROPHILS NFR BLD AUTO: 3.55 10*3/MM3 (ref 1.7–7)
NEUTROPHILS NFR BLD AUTO: 59 % (ref 42.7–76)
NITRITE UR QL STRIP: NEGATIVE
NRBC BLD AUTO-RTO: 0 /100 WBC (ref 0–0.2)
PH UR STRIP.AUTO: 6 [PH] (ref 5–8)
PLATELET # BLD AUTO: 264 10*3/MM3 (ref 140–450)
PMV BLD AUTO: 11 FL (ref 6–12)
POTASSIUM SERPL-SCNC: 4.3 MMOL/L (ref 3.5–5.2)
PROT SERPL-MCNC: 6.6 G/DL (ref 6–8.5)
PROT UR QL STRIP: NEGATIVE
RBC # BLD AUTO: 4.58 10*6/MM3 (ref 3.77–5.28)
RBC # UR STRIP: NORMAL /HPF
REF LAB TEST METHOD: NORMAL
SODIUM SERPL-SCNC: 138 MMOL/L (ref 136–145)
SP GR UR STRIP: 1.01 (ref 1–1.03)
SQUAMOUS #/AREA URNS HPF: NORMAL /HPF
TRIGL SERPL-MCNC: 98 MG/DL (ref 0–150)
TSH SERPL DL<=0.05 MIU/L-ACNC: 2.53 UIU/ML (ref 0.27–4.2)
UROBILINOGEN UR QL STRIP: ABNORMAL
VLDLC SERPL-MCNC: 17 MG/DL (ref 5–40)
WBC # UR STRIP: NORMAL /HPF
WBC NRBC COR # BLD: 6.02 10*3/MM3 (ref 3.4–10.8)

## 2022-05-11 PROCEDURE — 36415 COLL VENOUS BLD VENIPUNCTURE: CPT

## 2022-05-11 PROCEDURE — 80053 COMPREHEN METABOLIC PANEL: CPT

## 2022-05-11 PROCEDURE — 82306 VITAMIN D 25 HYDROXY: CPT

## 2022-05-11 PROCEDURE — 85025 COMPLETE CBC W/AUTO DIFF WBC: CPT

## 2022-05-11 PROCEDURE — 99024 POSTOP FOLLOW-UP VISIT: CPT | Performed by: NURSE PRACTITIONER

## 2022-05-11 PROCEDURE — 83036 HEMOGLOBIN GLYCOSYLATED A1C: CPT

## 2022-05-11 PROCEDURE — 81001 URINALYSIS AUTO W/SCOPE: CPT

## 2022-05-11 PROCEDURE — 84443 ASSAY THYROID STIM HORMONE: CPT

## 2022-05-11 PROCEDURE — 80061 LIPID PANEL: CPT

## 2022-05-11 NOTE — PROGRESS NOTES
"  Post-Op BBR    Subjective            History of Present Illness  Cassandra Maria is a 61 y.o. female who presents to [facility] as Postoperative Follow-Up from bilateral breast reduction 4/1/22.    Pt comes in today for green, yellowish drainage coming from right breast nipple. Present for a week since her last office visit. She has been putting antibiotic ointment to the area and drainage has stopped for 2 days now. Does have a suture spitting on left end breast incision.         Allergies: Patient has no known allergies.  Allergies Reconciled.    Review of Systems   All review of system has been reviewed and it  is negative except the ones note above.     Objective     /85   Pulse 66   Temp 97.5 °F (36.4 °C) (Temporal)   Ht 152.4 cm (60\")   SpO2 98%   BMI 40.43 kg/m²     Body mass index is 40.43 kg/m².    Physical Exam    Cardiovascular: Normal rate    Pulmonary/Chest: Effort normal    Breast: Incisions healing well, breast soft with scattered firm areas bilaterally, good symmetry, expected edema, nipples viable, healing open area right nipple, dissolving staple over left incision                                                            Result Review :                Assessment and Plan      Diagnoses and all orders for this visit:    1. Postoperative follow-up (Primary)        Plan:  • Healing well. Limit activity of arms until 6 weeks post op then gradually resume activity as tolerated. May wear sports bra as tolerated, regular bra at 6 weeks, RTC for any complications. RTC 3 months post op    Follow Up     No follow-ups on file.    Patient was given instructions and counseling regarding her condition. Please see specific information pulled into the AVS if appropriate.     Tova Childs, APRN  05/11/2022  "

## 2022-05-12 ENCOUNTER — TELEPHONE (OUTPATIENT)
Dept: FAMILY MEDICINE CLINIC | Facility: CLINIC | Age: 62
End: 2022-05-12

## 2022-05-12 NOTE — TELEPHONE ENCOUNTER
----- Message from JUDD Solis sent at 5/12/2022  6:27 AM EDT -----  Low chol diet and exercise  Otherwise normal labs

## 2022-06-08 DIAGNOSIS — M54.9 BACK PAIN, UNSPECIFIED BACK LOCATION, UNSPECIFIED BACK PAIN LATERALITY, UNSPECIFIED CHRONICITY: ICD-10-CM

## 2022-06-08 RX ORDER — IBUPROFEN 800 MG/1
TABLET ORAL
Qty: 60 TABLET | Refills: 1 | OUTPATIENT
Start: 2022-06-08

## 2022-06-27 RX ORDER — DULAGLUTIDE 1.5 MG/.5ML
INJECTION, SOLUTION SUBCUTANEOUS
Qty: 2 ML | Refills: 1 | Status: SHIPPED | OUTPATIENT
Start: 2022-06-27 | End: 2022-08-22

## 2022-06-28 ENCOUNTER — OFFICE VISIT (OUTPATIENT)
Dept: PLASTIC SURGERY | Facility: CLINIC | Age: 62
End: 2022-06-28

## 2022-06-28 VITALS
HEART RATE: 97 BPM | WEIGHT: 204.4 LBS | BODY MASS INDEX: 40.13 KG/M2 | OXYGEN SATURATION: 81 % | SYSTOLIC BLOOD PRESSURE: 152 MMHG | TEMPERATURE: 97.8 F | HEIGHT: 60 IN | DIASTOLIC BLOOD PRESSURE: 75 MMHG

## 2022-06-28 DIAGNOSIS — N62 MACROMASTIA: Primary | ICD-10-CM

## 2022-06-28 PROCEDURE — 99024 POSTOP FOLLOW-UP VISIT: CPT | Performed by: NURSE PRACTITIONER

## 2022-06-28 NOTE — PROGRESS NOTES
"Chief Complaint  Post-op Follow-up (3m post op )    Subjective          History of Present Illness  Cassandra Maria is a 61 y.o. female who presents to [facility] for 3 month Postoperative Follow-Up of BREAST REDUCTION BILATERAL on 04/01/2022.    She is 3m post op and is c/o painful knot outer left breast underarm area. She denies open areas, drainage, redness and warmth.     Allergies: Patient has no known allergies.  Allergies Reconciled.    Review of Systems   All review of system has been reviewed and it  is negative except the ones note above.     Objective     /75 (BP Location: Left arm)   Pulse 97   Temp 97.8 °F (36.6 °C) (Temporal)   Ht 152.4 cm (60\")   Wt 92.7 kg (204 lb 6.4 oz)   SpO2 (!) 81%   BMI 39.92 kg/m²     Body mass index is 39.92 kg/m².    Physical Exam   Cardiovascular: Normal rate.     Pulmonary/Chest  Effort normal.     Breast:  Incision healing well, no swelling, no erythema, no drainage, firm tender areas palapated felt mid left breast and outer left breast.     Result Review :                Assessment and Plan      Diagnoses and all orders for this visit:    1. Macromastia (Primary)        Plan:  Re-evaluate possible fat necrosis mid left breast and outer left breast at 6m follow up, heat & massage areas to help with fat necrosis  RTO 3m for 6m post op         Follow Up     Return for 3m for 6m post op .    Patient was given instructions and counseling regarding her condition. Please see specific information pulled into the AVS if appropriate.     Tova Childs, APRN  06/28/2022    "

## 2022-08-22 RX ORDER — DULAGLUTIDE 1.5 MG/.5ML
INJECTION, SOLUTION SUBCUTANEOUS
Qty: 2 ML | Refills: 2 | Status: SHIPPED | OUTPATIENT
Start: 2022-08-22 | End: 2022-10-24 | Stop reason: SDUPTHER

## 2022-08-23 ENCOUNTER — OFFICE VISIT (OUTPATIENT)
Dept: PLASTIC SURGERY | Facility: CLINIC | Age: 62
End: 2022-08-23

## 2022-08-23 VITALS
TEMPERATURE: 98.9 F | DIASTOLIC BLOOD PRESSURE: 84 MMHG | BODY MASS INDEX: 39.54 KG/M2 | HEIGHT: 60 IN | SYSTOLIC BLOOD PRESSURE: 136 MMHG | HEART RATE: 70 BPM | OXYGEN SATURATION: 98 % | WEIGHT: 201.4 LBS

## 2022-08-23 DIAGNOSIS — M79.89 FAT NECROSIS: ICD-10-CM

## 2022-08-23 DIAGNOSIS — N62 MACROMASTIA: Primary | ICD-10-CM

## 2022-08-23 PROCEDURE — 97035 APP MDLTY 1+ULTRASOUND EA 15: CPT | Performed by: NURSE PRACTITIONER

## 2022-08-23 NOTE — PROGRESS NOTES
"Chief Complaint  Follow-up (Pain in left breast )    Subjective          History of Present Illness  Cassandra Maria is a 61 y.o. female who presents to [facility]LAST[facility] RECON[facility]GERY for Postoperative Follow-Up of BBR, pain In left breast. 4/1/22 surgery date. Left breast is having pain around the nipple off and on for a couple months. She's been massaging to get the knots out. Her incisions are doing well and no other concerns     Allergies: Patient has no known allergies.  Allergies Reconciled.    Review of Systems   All review of system has been reviewed and it  is negative except the ones note above.     Objective     /84 (BP Location: Right arm, Patient Position: Sitting)   Pulse 70   Temp 98.9 °F (37.2 °C)   Ht 152.4 cm (60\")   Wt 91.4 kg (201 lb 6.4 oz)   SpO2 98%   BMI 39.33 kg/m²     Body mass index is 39.33 kg/m².    Physical Exam   Cardiovascular: Normal rate.     Pulmonary/Chest  Effort normal.     Breast:  Incision healing well, no swelling, no erythema, no drainage, scattered firm areas that are improving in size but are tender    Result Review :                Assessment and Plan      Diagnoses and all orders for this visit:    1. Macromastia (Primary)    2. Fat necrosis        Plan:  • Therapeutic US bilaterally today and will do another one in one week to see if tenderness improves. If lumps do not improve any further may discuss excising. Due for mammo in November. RTC for US next week.         Follow Up     No follow-ups on file.    Patient was given instructions and counseling regarding her condition. Please see specific information pulled into the AVS if appropriate.     Tova Childs, APRN  08/23/2022    "

## 2022-08-23 NOTE — PROGRESS NOTES
Chief Complaint  Follow-up (Pain in left breast )    Subjective     {Problem List  Visit Diagnosis   Encounters  Notes  Medications  Labs  Result Review Imaging  Media :23}     History of Present Illness  Cassandra Maria is a 61 y.o. female who presents to [facility]GERY for Postoperative Follow-Up of BBR, pain In left breast. 4/1/22 surgery date. Left breast is having pain around the nipple off and on for a couple months. She's been massaging to get the knots out. Her incisions are doing well and no other concerns     Allergies: Patient has no known allergies.  Allergies Reconciled.    Review of Systems   All review of system has been reviewed and it  is negative except the ones note above.     Objective     There were no vitals taken for this visit.    There is no height or weight on file to calculate BMI.    Physical Exam  General Inspection: Incision healing well, no swelling, no erythema, no drainage.    Result Review :{Labs  Result Review  Imaging  Med Tab  Media :23}   {The following data was reviewed by (Optional):[id number]}             Assessment and Plan {CC Problem List  Visit Diagnosis  ROS  Review (Popup)  [facility] Maintenance  Quality  BestPractice  Medications  SmartSets  SnapShot Encounters  Media :23}     There are no diagnoses linked to this encounter.    Plan:  • ***    {Time Spent (Optional):92044}    Follow Up {Instructions Charge Capture  Follow-up Communications :23}    No follow-ups on file.    Patient was given instructions and counseling regarding her condition. Please see specific information pulled into the AVS if appropriate.     FELIPE Cyr  08/23/2022

## 2022-08-30 ENCOUNTER — CLINICAL SUPPORT (OUTPATIENT)
Dept: PLASTIC SURGERY | Facility: CLINIC | Age: 62
End: 2022-08-30

## 2022-08-30 DIAGNOSIS — M54.9 BACK PAIN, UNSPECIFIED BACK LOCATION, UNSPECIFIED BACK PAIN LATERALITY, UNSPECIFIED CHRONICITY: ICD-10-CM

## 2022-08-30 DIAGNOSIS — Z09 POSTOPERATIVE FOLLOW-UP: Primary | ICD-10-CM

## 2022-08-30 RX ORDER — IBUPROFEN 800 MG/1
TABLET ORAL
Qty: 60 TABLET | Refills: 1 | Status: SHIPPED | OUTPATIENT
Start: 2022-08-30 | End: 2022-12-14

## 2022-08-30 NOTE — PROGRESS NOTES
Chief Complaint  Follow-up (RECON- ULTRASOUND on bilateral breasts)    Subjective          History of Present Illness  Cassandra Maria is a 61 y.o. female who presents to [facility] RECONSTRU[facility] SURGERY for Postoperative Follow-Up of BBR, pain In left breast. 4/1/22 surgery date. Left breast has some improvement since ultrasound. Doing well.  Here today for bilateral therapeutic ultrasound.        Allergies: Patient has no known allergies.  Allergies Reconciled.    Review of Systems   All review of system has been reviewed and it  is negative except the ones note above.     Objective     There were no vitals taken for this visit.    There is no height or weight on file to calculate BMI.    Physical Exam   Cardiovascular: Normal rate.     Pulmonary/Chest  Effort normal.     Breast:  Incision healing well, no swelling, no erythema, no drainage, scattered firm areas that are improving in size but are tender    Result Review :                Assessment and Plan      There are no diagnoses linked to this encounter.    Plan:  Therapeutic US bilaterally today and will do another one in one week to see if tenderness improves.       Follow Up       Patient was given instructions and counseling regarding her condition. Please see specific information pulled into the AVS if appropriate.     Maddie Almanza MA  08/30/2022

## 2022-09-06 ENCOUNTER — CLINICAL SUPPORT (OUTPATIENT)
Dept: PLASTIC SURGERY | Facility: CLINIC | Age: 62
End: 2022-09-06

## 2022-09-06 NOTE — PROGRESS NOTES
Chief Complaint  Post-op Follow-up (Follow up Ultrasound/Breast Tenderness )    Subjective          History of Present Illness  Cassandra Marai is a 62 y.o. female who presents to [facility]LAST[facility] RECON[facility] SURGERY for Postoperative Follow-Up of BBR, pain In left breast. 4/1/22 surgery date. Left breast has some improvement since ultrasound. Doing well.  Here today for bilateral therapeutic ultrasound.        Allergies: Patient has no known allergies.  Allergies Reconciled.    Review of Systems   All review of system has been reviewed and it  is negative except the ones note above.     Objective     There were no vitals taken for this visit.    There is no height or weight on file to calculate BMI.    Physical Exam   Cardiovascular: Normal rate.     Pulmonary/Chest  Effort normal.     Breast:  Incision healing well, no swelling, no erythema, no drainage, scattered firm areas that are improving in size but are tender but also states it's improving.     Result Review :                Assessment and Plan      There are no diagnoses linked to this encounter.    Plan:  Therapeutic US bilaterally today, she states she will follow up at her next scheduled appt in October but will call if any changes or she feels like she needs another ultrasound.       Follow Up Next Scheduled Appt       Patient was given instructions and counseling regarding her condition. Please see specific information pulled into the AVS if appropriate.     Deepti Lim  09/06/2022

## 2022-09-13 DIAGNOSIS — E55.9 VITAMIN D DEFICIENCY: ICD-10-CM

## 2022-09-13 RX ORDER — ERGOCALCIFEROL 1.25 MG/1
CAPSULE ORAL
Qty: 13 CAPSULE | Refills: 1 | Status: SHIPPED | OUTPATIENT
Start: 2022-09-13 | End: 2022-12-14 | Stop reason: SDUPTHER

## 2022-10-15 DIAGNOSIS — E11.65 UNCONTROLLED TYPE 2 DIABETES MELLITUS WITH HYPERGLYCEMIA: ICD-10-CM

## 2022-10-17 NOTE — TELEPHONE ENCOUNTER
HUB to read    Phoned patient to ask which provider she is going to see and that she needs to set up an appointment

## 2022-10-19 NOTE — PROGRESS NOTES
"Chief Complaint  Post-op Follow-up (6 month bbr)    Subjective          History of Present Illness  Cassandra Maria is a 62 y.o. female who presents to [facility] RECON[facility] SURGERY for Postoperative Follow-Up of BBR done 04/01/22.     She is 6m post op. Happy with results. Does still have some tender knots in bilateral breasts. Has some excess skin on bilateral sides that gets hung in her clothes that the scar is causing it to pucker.        Allergies: Patient has no known allergies.  Allergies Reconciled.    Review of Systems   All review of system has been reviewed and it  is negative except the ones note above.     Objective     /90 (BP Location: Left arm, Patient Position: Sitting, Cuff Size: Adult)   Pulse 81   Temp 98.6 °F (37 °C) (Temporal)   Ht 152.4 cm (60\")   SpO2 97%   BMI 39.33 kg/m²     Body mass index is 39.33 kg/m².    Physical Exam   Cardiovascular: Normal rate.     Pulmonary/Chest  Effort normal.     Breast: breasts soft with 3 cm areas of fat necrosis in bilateral breasts, puckering and excess tissue bilaterally with tenderness     Result Review :     PROCEDURE:  RECON-REVISION OF BREAST REDUCTION and mass removal       Assessment and Plan      Diagnoses and all orders for this visit:    1. Breast mass in female (Primary)    2. Excessive and redundant skin and subcutaneous tissue    3. Hypertrophic scar        Plan:   We will have her do her mammogram but may have some fat necrosis. No sun exposure for up to a year. RTC 1 year post op. If tender knots are not getting any better then we will discuss in office excision sooner. Patient is aware to call us if she has any other concerns. Will put on surgery list.    Discussed risk and benefits of revision including bleeding, infection,reoccurence  and scarring. Patient wishes to proceed.     Time:2 hours  Consent: Bilateral breast scar revision with excision of bilateral breast mass  CPT:  09743, 44258, " 11403 x 2, 59608          Scribed by Maddie Almanza MA, acting as a scribe for GENESIS Reinoso, 10/20/22 11:57 EDT.  GENESIS Reinoso's signature on the note affirms that the note adequately documents the care provided.    Patient was given instructions and counseling regarding her condition. Please see specific information pulled into the AVS if appropriate.     GENESIS Reinoso  10/20/2022

## 2022-10-20 ENCOUNTER — OFFICE VISIT (OUTPATIENT)
Dept: PLASTIC SURGERY | Facility: CLINIC | Age: 62
End: 2022-10-20

## 2022-10-20 VITALS
TEMPERATURE: 98.6 F | SYSTOLIC BLOOD PRESSURE: 144 MMHG | HEIGHT: 60 IN | OXYGEN SATURATION: 97 % | HEART RATE: 81 BPM | DIASTOLIC BLOOD PRESSURE: 90 MMHG | BODY MASS INDEX: 39.33 KG/M2

## 2022-10-20 DIAGNOSIS — L98.7 EXCESSIVE AND REDUNDANT SKIN AND SUBCUTANEOUS TISSUE: ICD-10-CM

## 2022-10-20 DIAGNOSIS — N63.0 BREAST MASS IN FEMALE: Primary | ICD-10-CM

## 2022-10-20 DIAGNOSIS — L91.0 HYPERTROPHIC SCAR: ICD-10-CM

## 2022-10-20 PROCEDURE — 99212 OFFICE O/P EST SF 10 MIN: CPT | Performed by: NURSE PRACTITIONER

## 2022-10-24 ENCOUNTER — TELEPHONE (OUTPATIENT)
Dept: FAMILY MEDICINE CLINIC | Facility: CLINIC | Age: 62
End: 2022-10-24

## 2022-10-24 DIAGNOSIS — E11.65 UNCONTROLLED TYPE 2 DIABETES MELLITUS WITH HYPERGLYCEMIA: ICD-10-CM

## 2022-10-24 RX ORDER — DULAGLUTIDE 1.5 MG/.5ML
1.5 INJECTION, SOLUTION SUBCUTANEOUS
Qty: 2 ML | Refills: 0 | Status: SHIPPED | OUTPATIENT
Start: 2022-10-24 | End: 2022-11-14 | Stop reason: SDUPTHER

## 2022-10-24 NOTE — TELEPHONE ENCOUNTER
Caller: Cassandra Maria    Relationship: Self    Best call back number: [phone]    Requested Prescriptions:    metFORMIN (GLUCOPHAGE) 1000 MG tablet TAKE 1 TABLET BY MOUTH TWICE DAILY WITH MEALS    Trulicity 1.5 MG/0.5ML solution pen-injector ADMINISTER 1.5 MG UNDER THE SKIN 1 TIME EVERY WEEK AS DIRECTED    Pharmacy where request should be sent:  [id number] [place] [place] [place] [facility] [address] [facility] AT [facility] - [phone]  - [phone]   [phone]    Additional details provided by patient: PATIENT HAS AN APPOINTMENT IN December BUT IS GOING TO RUN OUT OF THESE TWO MEDICATIONS BEFORE APPOINTMENT    Does the patient have less than a 3 day supply:  [] Yes  [x] No    Danny Springer Rep   10/24/22 11:14 EDT

## 2022-11-14 RX ORDER — DULAGLUTIDE 1.5 MG/.5ML
1.5 INJECTION, SOLUTION SUBCUTANEOUS
Qty: 2 ML | Refills: 0 | Status: SHIPPED | OUTPATIENT
Start: 2022-11-14 | End: 2022-12-12

## 2022-12-12 RX ORDER — DULAGLUTIDE 1.5 MG/.5ML
INJECTION, SOLUTION SUBCUTANEOUS
Qty: 2 ML | Refills: 0 | Status: SHIPPED | OUTPATIENT
Start: 2022-12-12 | End: 2022-12-14 | Stop reason: SDUPTHER

## 2022-12-14 ENCOUNTER — OFFICE VISIT (OUTPATIENT)
Dept: FAMILY MEDICINE CLINIC | Facility: CLINIC | Age: 62
End: 2022-12-14

## 2022-12-14 ENCOUNTER — HOSPITAL ENCOUNTER (OUTPATIENT)
Dept: GENERAL RADIOLOGY | Facility: HOSPITAL | Age: 62
Discharge: HOME | End: 2022-12-14
Admitting: NURSE PRACTITIONER

## 2022-12-14 VITALS
OXYGEN SATURATION: 99 % | BODY MASS INDEX: 38.09 KG/M2 | DIASTOLIC BLOOD PRESSURE: 76 MMHG | HEART RATE: 81 BPM | WEIGHT: 194 LBS | SYSTOLIC BLOOD PRESSURE: 153 MMHG | HEIGHT: 60 IN

## 2022-12-14 DIAGNOSIS — M54.42 ACUTE BILATERAL LOW BACK PAIN WITH LEFT-SIDED SCIATICA: ICD-10-CM

## 2022-12-14 DIAGNOSIS — Z13.29 THYROID DISORDER SCREEN: ICD-10-CM

## 2022-12-14 DIAGNOSIS — Z12.11 COLON CANCER SCREENING: ICD-10-CM

## 2022-12-14 DIAGNOSIS — Z12.31 BREAST CANCER SCREENING BY MAMMOGRAM: ICD-10-CM

## 2022-12-14 DIAGNOSIS — I10 HYPERTENSION, UNSPECIFIED TYPE: ICD-10-CM

## 2022-12-14 DIAGNOSIS — M54.42 ACUTE BILATERAL LOW BACK PAIN WITH LEFT-SIDED SCIATICA: Primary | ICD-10-CM

## 2022-12-14 DIAGNOSIS — E11.9 TYPE 2 DIABETES MELLITUS WITHOUT COMPLICATION, WITHOUT LONG-TERM CURRENT USE OF INSULIN: ICD-10-CM

## 2022-12-14 DIAGNOSIS — E55.9 VITAMIN D DEFICIENCY: ICD-10-CM

## 2022-12-14 PROBLEM — E11.65 CONTROLLED TYPE 2 DIABETES MELLITUS WITH HYPERGLYCEMIA, WITHOUT LONG-TERM CURRENT USE OF INSULIN: Status: RESOLVED | Noted: 2022-05-03 | Resolved: 2022-12-14

## 2022-12-14 PROCEDURE — 72100 X-RAY EXAM L-S SPINE 2/3 VWS: CPT

## 2022-12-14 PROCEDURE — 99214 OFFICE O/P EST MOD 30 MIN: CPT | Performed by: NURSE PRACTITIONER

## 2022-12-14 PROCEDURE — 90471 IMMUNIZATION ADMIN: CPT | Performed by: NURSE PRACTITIONER

## 2022-12-14 PROCEDURE — 90686 IIV4 VACC NO PRSV 0.5 ML IM: CPT | Performed by: NURSE PRACTITIONER

## 2022-12-14 RX ORDER — DICLOFENAC SODIUM 75 MG/1
75 TABLET, DELAYED RELEASE ORAL 2 TIMES DAILY
Qty: 60 TABLET | Refills: 0 | Status: SHIPPED | OUTPATIENT
Start: 2022-12-14 | End: 2023-01-10

## 2022-12-14 RX ORDER — CYCLOBENZAPRINE HCL 10 MG
10 TABLET ORAL 2 TIMES DAILY PRN
Qty: 30 TABLET | Refills: 0 | Status: SHIPPED | OUTPATIENT
Start: 2022-12-14

## 2022-12-14 RX ORDER — ERGOCALCIFEROL 1.25 MG/1
50000 CAPSULE ORAL
Qty: 13 CAPSULE | Refills: 1 | Status: SHIPPED | OUTPATIENT
Start: 2022-12-14

## 2022-12-14 RX ORDER — DULAGLUTIDE 1.5 MG/.5ML
1.5 INJECTION, SOLUTION SUBCUTANEOUS WEEKLY
Qty: 2 ML | Refills: 2 | Status: SHIPPED | OUTPATIENT
Start: 2022-12-14 | End: 2023-01-10

## 2022-12-14 RX ORDER — METHYLPREDNISOLONE 4 MG/1
TABLET ORAL
Qty: 1 EACH | Refills: 0 | Status: SHIPPED | OUTPATIENT
Start: 2022-12-14 | End: 2023-02-08

## 2022-12-14 RX ORDER — LISINOPRIL 5 MG/1
5 TABLET ORAL DAILY
Qty: 90 TABLET | Refills: 1 | Status: SHIPPED | OUTPATIENT
Start: 2022-12-14

## 2022-12-14 NOTE — PROGRESS NOTES
Chief Complaint  Diabetes, Vitamin D Deficiency, and Back Pain    SUBJECTIVE  Cassandra Maria presents to [facility] to establish care with new provider within the office, previous PCP Marcos Thomas.     Patient has diabetes, vitamin D deficiency and complaining of back pain.  Pt states she has been having back pain since . States was bending and twisting preparing for cooking on , thinks that may have flared it up.     Mammogram: 21   Dexa: 22  Colon: 2016   Reports pain is in the center of the back, radiates across the low back, and goes down her left side but, leg, down to her knee.  He endorses occasional numbness and tingling in left lower extremity as well.  Denies any loss of control of bowel or bladder, denies any saddle anesthesia.  Pt states pain is better when up on her feet but when siting or laying pain flares back up,   History of Present Illness  Past Medical History:   Diagnosis Date   • Condition not found     Hernia   • Diabetes mellitus (HCC)     HAD ELEVATED A1C- ON MEDS- BG RUNS AROUND  IN AM   • Foot pain, right 2018   • Fracture of 5th metatarsal 2018   • Gall stones    • Heel pain    • Hot flashes 04/10/2014   • Impaired fasting glucose 2018   • Joint pain 2014   • Low back pain    • Lumbar pain with radiation down left leg 2015   • Macromastia    • Migraine headache    • Night sweats    • Obesity 2018   • Osteoporosis 2014   • Pain in thoracic spine 04/10/2014   • Sinus trouble    • Vitamin D deficiency 04/10/2014      Family History   Problem Relation Age of Onset   • Breast cancer Mother    • Diabetes Mother    • Liver cancer Father    • Lung cancer Father    • Breast cancer Sister    • Diabetes Sister    • Colon cancer Paternal Aunt 67         age not listed   • Diabetes Paternal Aunt    • Malig Hyperthermia Neg Hx       Past Surgical History:   Procedure  "Laterality Date   • BILATERAL BREAST REDUCTION Bilateral 2022    Procedure: BREAST REDUCTION BILATERAL;  Surgeon: Daniele Walker MD;  Location: [facility] [place];  Service: Plastics;  Laterality: Bilateral;   •  SECTION     • CHOLECYSTECTOMY     • COLONOSCOPY  2016    5yr due    • ENDOSCOPY  2016   • HYSTERECTOMY     • INCONTINENCE SURGERY     • ROTATOR CUFF REPAIR      right   • VENTRAL HERNIA REPAIR          Current Outpatient Medications:   •  Dulaglutide (Trulicity) 1.5 MG/0.5ML solution pen-injector, Inject 1.5 mg under the skin into the appropriate area as directed 1 (One) Time Per Week., Disp: 2 mL, Rfl: 2  •  metFORMIN (GLUCOPHAGE) 1000 MG tablet, Take 1 tablet by mouth 2 (Two) Times a Day With Meals., Disp: 180 tablet, Rfl: 0  •  vitamin D (ERGOCALCIFEROL) 1.25 MG (40173 UT) capsule capsule, Take 1 capsule by mouth Every 7 (Seven) Days., Disp: 13 capsule, Rfl: 1  •  cyclobenzaprine (FLEXERIL) 10 MG tablet, Take 1 tablet by mouth 2 (Two) Times a Day As Needed for Muscle Spasms., Disp: 30 tablet, Rfl: 0  •  diclofenac (VOLTAREN) 75 MG EC tablet, Take 1 tablet by mouth 2 (Two) Times a Day., Disp: 60 tablet, Rfl: 0  •  lisinopril (PRINIVIL,ZESTRIL) 5 MG tablet, Take 1 tablet by mouth Daily., Disp: 90 tablet, Rfl: 1  •  methylPREDNISolone (MEDROL) 4 MG dose pack, Take as directed on package instructions., Disp: 1 each, Rfl: 0    OBJECTIVE  Vital Signs:   /76   Pulse 81   Ht 152.4 cm (60\")   Wt 88 kg (194 lb)   SpO2 99%   BMI 37.89 kg/m²    Estimated body mass index is 37.89 kg/m² as calculated from the following:    Height as of this encounter: 152.4 cm (60\").    Weight as of this encounter: 88 kg (194 lb).     Wt Readings from Last 3 Encounters:   22 88 kg (194 lb)   22 91.4 kg (201 lb 6.4 oz)   22 92.7 kg (204 lb 6.4 oz)     BP Readings from Last 3 Encounters:   22 153/76   10/20/22 144/90   22 136/84       Physical Exam  Vitals " reviewed.   Constitutional:       Appearance: Normal appearance. She is well-developed.   HENT:      Head: Normocephalic and atraumatic.      Right Ear: External ear normal.      Left Ear: External ear normal.      Mouth/Throat:      Pharynx: No oropharyngeal exudate.   Eyes:      Conjunctiva/sclera: Conjunctivae normal.      Pupils: Pupils are equal, round, and reactive to light.   Cardiovascular:      Rate and Rhythm: Normal rate and regular rhythm.      Heart sounds: No murmur heard.    No friction rub. No gallop.   Pulmonary:      Effort: Pulmonary effort is normal.      Breath sounds: Normal breath sounds. No wheezing or rhonchi.   Abdominal:      General: Bowel sounds are normal. There is no distension.      Palpations: Abdomen is soft.      Tenderness: There is no abdominal tenderness.   Musculoskeletal:      Lumbar back: Tenderness present. No swelling or bony tenderness. Positive left straight leg raise test.   Skin:     General: Skin is warm and dry.   Neurological:      Mental Status: She is alert and oriented to person, place, and time.      Cranial Nerves: No cranial nerve deficit.   Psychiatric:         Mood and Affect: Mood and affect normal.         Behavior: Behavior normal.         Thought Content: Thought content normal.         Judgment: Judgment normal.          Result Review    CMP    CMP 12/30/21 4/1/22 5/11/22   Glucose 101 (A) 119 (A) 88   BUN 6 (A) 8 13   Creatinine 0.61 0.60 0.62   eGFR Non African Am 100     Sodium 138 141 138   Potassium 3.9 4.4 4.3   Chloride 105 107 103   Calcium 9.4 9.6 9.9   Albumin 4.40  4.10   Total Bilirubin 1.2  0.8   Alkaline Phosphatase 45  44   AST (SGOT) 54 (A)  26   ALT (SGPT) 74 (A)  29   (A) Abnormal value       Comments are available for some flowsheets but are not being displayed.           CBC    CBC 5/11/22   WBC 6.02   RBC 4.58   Hemoglobin 14.2   Hematocrit 41.0   MCV 89.5   MCH 31.0   MCHC 34.6   RDW 11.6 (A)   Platelets 264   (A) Abnormal value             Lipid Panel    Lipid Panel 5/11/22   Total Cholesterol 225 (A)   Triglycerides 98   HDL Cholesterol 65 (A)   VLDL Cholesterol 17   LDL Cholesterol  143 (A)   LDL/HDL Ratio 2.16   (A) Abnormal value            TSH    TSH 5/11/22   TSH 2.530           Most Recent A1C    HGBA1C Most Recent 5/11/22   Hemoglobin A1C 5.30             No Images in the past 120 days found..     The above data has been reviewed by GENESIS Gates 12/14/2022 13:50 EST.          Patient Care Team:  Marquita Mata APRN as PCP - General (Nurse Practitioner)  Daniele Walker MD as Consulting Physician (Plastic Surgery)  Tova Childs APRN as Nurse Practitioner (Plastic Surgery)    Class 2 Severe Obesity (BMI >=35 and <=39.9). Obesity-related health conditions include the following: hypertension and diabetes mellitus. Obesity is improving with lifestyle modifications. BMI is is above average; BMI management plan is completed. We discussed portion control and increasing exercise.       ASSESSMENT & PLAN    Diagnoses and all orders for this visit:    1. Acute bilateral low back pain with left-sided sciatica (Primary)  -     XR Spine Lumbar 2 or 3 View; Future  -     Ambulatory Referral to Physical Therapy Evaluate and treat  -     diclofenac (VOLTAREN) 75 MG EC tablet; Take 1 tablet by mouth 2 (Two) Times a Day.  Dispense: 60 tablet; Refill: 0  -     cyclobenzaprine (FLEXERIL) 10 MG tablet; Take 1 tablet by mouth 2 (Two) Times a Day As Needed for Muscle Spasms.  Dispense: 30 tablet; Refill: 0  -     methylPREDNISolone (MEDROL) 4 MG dose pack; Take as directed on package instructions.  Dispense: 1 each; Refill: 0    2. Type 2 diabetes mellitus without complication, without long-term current use of insulin ([facility])  Assessment & Plan:  Diabetes is very well controlled at present, last A1c 5.3%.  Continue current medications    Orders:  -     Hemoglobin A1c; Future  -     Lipid panel; Future  -     CBC w AUTO Differential;  Future  -     Comprehensive metabolic panel; Future  -     metFORMIN (GLUCOPHAGE) 1000 MG tablet; Take 1 tablet by mouth 2 (Two) Times a Day With Meals.  Dispense: 180 tablet; Refill: 0  -     Dulaglutide (Trulicity) 1.5 MG/0.5ML solution pen-injector; Inject 1.5 mg under the skin into the appropriate area as directed 1 (One) Time Per Week.  Dispense: 2 mL; Refill: 2    3. Vitamin D deficiency  -     Vitamin D 25 hydroxy; Future  -     vitamin D (ERGOCALCIFEROL) 1.25 MG (81726 UT) capsule capsule; Take 1 capsule by mouth Every 7 (Seven) Days.  Dispense: 13 capsule; Refill: 1    4. Thyroid disorder screen  -     TSH; Future    5. Colon cancer screening  -     Ambulatory Referral to Gastroenterology    6. Breast cancer screening by mammogram  -     Mammo Screening Digital Tomosynthesis Bilateral With CAD; Future    7. Hypertension, unspecified type  Assessment & Plan:  Hypertension newly diagnosed today, blood pressure elevated at last 3 visits.  We will trial a very low-dose of lisinopril for added benefit of renal protection related to patient's diabetes, we will follow-up in 3 months for reevaluation.    Orders:  -     lisinopril (PRINIVIL,ZESTRIL) 5 MG tablet; Take 1 tablet by mouth Daily.  Dispense: 90 tablet; Refill: 1    Other orders  -     FluLaval/Fluzone >6 mos (3519-0376)       Tobacco Use: Low Risk    • Smoking Tobacco Use: Never   • Smokeless Tobacco Use: Never   • Passive Exposure: Not on file       Follow Up     No follow-ups on file.        Patient was given instructions and counseling regarding her condition or for health maintenance advice. Please see specific information pulled into the AVS if appropriate.   I have reviewed information obtained and documented by others and I have confirmed the accuracy of this documented note.    GENESIS Gates

## 2022-12-14 NOTE — ASSESSMENT & PLAN NOTE
Hypertension newly diagnosed today, blood pressure elevated at last 3 visits.  We will trial a very low-dose of lisinopril for added benefit of renal protection related to patient's diabetes, we will follow-up in 3 months for reevaluation.

## 2022-12-15 DIAGNOSIS — M54.42 ACUTE BILATERAL LOW BACK PAIN WITH LEFT-SIDED SCIATICA: Primary | ICD-10-CM

## 2023-01-06 ENCOUNTER — PREP FOR SURGERY (OUTPATIENT)
Dept: OTHER | Facility: HOSPITAL | Age: 63
End: 2023-01-06
Payer: MEDICAID

## 2023-01-06 ENCOUNTER — CLINICAL SUPPORT (OUTPATIENT)
Dept: GASTROENTEROLOGY | Facility: CLINIC | Age: 63
End: 2023-01-06

## 2023-01-06 DIAGNOSIS — Z80.0 FAMILY HISTORY OF COLON CANCER: Primary | ICD-10-CM

## 2023-01-06 RX ORDER — SODIUM PICOSULFATE, MAGNESIUM OXIDE, AND ANHYDROUS CITRIC ACID 10; 3.5; 12 MG/160ML; G/160ML; G/160ML
160 LIQUID ORAL TAKE AS DIRECTED
Qty: 320 ML | Refills: 0 | Status: SHIPPED | OUTPATIENT
Start: 2023-01-06 | End: 2023-03-10 | Stop reason: CLARIF

## 2023-01-06 NOTE — PROGRESS NOTES
Cassandra Maria  REASON FOR CALL: SCREENING CALL, LAST COLON 2016 LANI  SENT IN PREP: CLENPIQ  DOS: 03.15.2023    Past Medical History:   Diagnosis Date   • Condition not found     Hernia   • Diabetes mellitus (HCC)     HAD ELEVATED A1C- ON MEDS- BG RUNS AROUND  IN AM   • Foot pain, right 2018   • Fracture of 5th metatarsal 2018   • Gall stones    • Heel pain    • Hot flashes 04/10/2014   • Impaired fasting glucose 2018   • Joint pain 2014   • Low back pain    • Lumbar pain with radiation down left leg 2015   • Macromastia    • Migraine headache    • Night sweats    • Obesity 2018   • Osteoporosis 2014   • Pain in thoracic spine 04/10/2014   • Sinus trouble    • Vitamin D deficiency 04/10/2014     No Known Allergies  Past Surgical History:   Procedure Laterality Date   • BILATERAL BREAST REDUCTION Bilateral 2022    Procedure: BREAST REDUCTION BILATERAL;  Surgeon: Daniele Walker MD;  Location: [facility] [place];  Service: Plastics;  Laterality: Bilateral;   •  SECTION     • CHOLECYSTECTOMY     • COLONOSCOPY  2016    LANI   • ENDOSCOPY  2016   • HYSTERECTOMY     • INCONTINENCE SURGERY     • ROTATOR CUFF REPAIR      right   • VENTRAL HERNIA REPAIR       Social History     Socioeconomic History   • Marital status:    Tobacco Use   • Smoking status: Never   • Smokeless tobacco: Never   Vaping Use   • Vaping Use: Never used   Substance and Sexual Activity   • Alcohol use: Never   • Drug use: Never   • Sexual activity: Defer     Family History   Problem Relation Age of Onset   • Breast cancer Mother    • Diabetes Mother    • Liver cancer Father    • Lung cancer Father    • Breast cancer Sister    • Diabetes Sister    • Colon cancer Paternal Aunt 67         age not listed   • Diabetes Paternal Aunt    • Malig Hyperthermia Neg Hx        Current Outpatient Medications:   •  cyclobenzaprine (FLEXERIL) 10 MG tablet,  Take 1 tablet by mouth 2 (Two) Times a Day As Needed for Muscle Spasms., Disp: 30 tablet, Rfl: 0  •  diclofenac (VOLTAREN) 75 MG EC tablet, Take 1 tablet by mouth 2 (Two) Times a Day., Disp: 60 tablet, Rfl: 0  •  Dulaglutide (Trulicity) 1.5 MG/0.5ML solution pen-injector, Inject 1.5 mg under the skin into the appropriate area as directed 1 (One) Time Per Week., Disp: 2 mL, Rfl: 2  •  lisinopril (PRINIVIL,ZESTRIL) 5 MG tablet, Take 1 tablet by mouth Daily., Disp: 90 tablet, Rfl: 1  •  metFORMIN (GLUCOPHAGE) 1000 MG tablet, Take 1 tablet by mouth 2 (Two) Times a Day With Meals., Disp: 180 tablet, Rfl: 0  •  vitamin D (ERGOCALCIFEROL) 1.25 MG (25456 UT) capsule capsule, Take 1 capsule by mouth Every 7 (Seven) Days., Disp: 13 capsule, Rfl: 1  •  methylPREDNISolone (MEDROL) 4 MG dose pack, Take as directed on package instructions., Disp: 1 each, Rfl: 0

## 2023-01-09 DIAGNOSIS — E11.9 TYPE 2 DIABETES MELLITUS WITHOUT COMPLICATION, WITHOUT LONG-TERM CURRENT USE OF INSULIN: ICD-10-CM

## 2023-01-10 DIAGNOSIS — M54.42 ACUTE BILATERAL LOW BACK PAIN WITH LEFT-SIDED SCIATICA: ICD-10-CM

## 2023-01-10 RX ORDER — DICLOFENAC SODIUM 75 MG/1
TABLET, DELAYED RELEASE ORAL
Qty: 60 TABLET | Refills: 0 | Status: SHIPPED | OUTPATIENT
Start: 2023-01-10 | End: 2023-02-09

## 2023-01-10 RX ORDER — DULAGLUTIDE 1.5 MG/.5ML
INJECTION, SOLUTION SUBCUTANEOUS
Qty: 2 ML | Refills: 2 | Status: SHIPPED | OUTPATIENT
Start: 2023-01-10

## 2023-01-10 NOTE — TELEPHONE ENCOUNTER
Caller: Cassandra Maria    Relationship: Self    Best call back number: [phone]    What is the best time to reach you: ANYTIME    Who are you requesting to speak with (clinical staff, provider,  specific staff member): MARY ALICE GILES    Do you know the name of the person who called: CASSANDRA    What was the call regarding: THE PATIENT want to change her imaging to [facility] for her MRI    Do you require a callback: [phone]

## 2023-01-10 NOTE — TELEPHONE ENCOUNTER
I have refilled her medication, [facility] please place order for [facility]

## 2023-01-13 ENCOUNTER — APPOINTMENT (OUTPATIENT)
Dept: MRI IMAGING | Facility: HOSPITAL | Age: 63
End: 2023-01-13
Payer: MEDICAID

## 2023-01-16 ENCOUNTER — TELEPHONE (OUTPATIENT)
Dept: FAMILY MEDICINE CLINIC | Facility: CLINIC | Age: 63
End: 2023-01-16
Payer: MEDICAID

## 2023-01-27 ENCOUNTER — TELEPHONE (OUTPATIENT)
Dept: FAMILY MEDICINE CLINIC | Facility: CLINIC | Age: 63
End: 2023-01-27
Payer: MEDICAID

## 2023-01-27 DIAGNOSIS — M54.9 BACK PAIN, UNSPECIFIED BACK LOCATION, UNSPECIFIED BACK PAIN LATERALITY, UNSPECIFIED CHRONICITY: ICD-10-CM

## 2023-01-27 DIAGNOSIS — M54.42 ACUTE BILATERAL LOW BACK PAIN WITH LEFT-SIDED SCIATICA: ICD-10-CM

## 2023-01-27 DIAGNOSIS — M85.80 OSTEOPENIA, UNSPECIFIED LOCATION: Primary | ICD-10-CM

## 2023-01-27 NOTE — TELEPHONE ENCOUNTER
PATIENT CALLING BECAUSE REFERRAL FOR MRI NEEDS TO BE SENT TO [facility] BEFORE PATIENT CAN SCHEDULE APPOINTMENT

## 2023-01-30 ENCOUNTER — LAB (OUTPATIENT)
Dept: LAB | Facility: HOSPITAL | Age: 63
End: 2023-01-30
Payer: MEDICAID

## 2023-01-30 DIAGNOSIS — E11.9 TYPE 2 DIABETES MELLITUS WITHOUT COMPLICATION, WITHOUT LONG-TERM CURRENT USE OF INSULIN: ICD-10-CM

## 2023-01-30 DIAGNOSIS — E55.9 VITAMIN D DEFICIENCY: ICD-10-CM

## 2023-01-30 DIAGNOSIS — Z13.29 THYROID DISORDER SCREEN: ICD-10-CM

## 2023-01-30 LAB
25(OH)D3 SERPL-MCNC: 57.1 NG/ML (ref 30–100)
ALBUMIN SERPL-MCNC: 4.3 G/DL (ref 3.5–5.2)
ALBUMIN/GLOB SERPL: 1.7 G/DL
ALP SERPL-CCNC: 50 U/L (ref 39–117)
ALT SERPL W P-5'-P-CCNC: 38 U/L (ref 1–33)
ANION GAP SERPL CALCULATED.3IONS-SCNC: 10.1 MMOL/L (ref 5–15)
AST SERPL-CCNC: 22 U/L (ref 1–32)
BASOPHILS # BLD AUTO: 0.06 10*3/MM3 (ref 0–0.2)
BASOPHILS NFR BLD AUTO: 0.9 % (ref 0–1.5)
BILIRUB SERPL-MCNC: 1.2 MG/DL (ref 0–1.2)
BUN SERPL-MCNC: 11 MG/DL (ref 8–23)
BUN/CREAT SERPL: 18 (ref 7–25)
CALCIUM SPEC-SCNC: 9.9 MG/DL (ref 8.6–10.5)
CHLORIDE SERPL-SCNC: 99 MMOL/L (ref 98–107)
CHOLEST SERPL-MCNC: 253 MG/DL (ref 0–200)
CO2 SERPL-SCNC: 26.9 MMOL/L (ref 22–29)
CREAT SERPL-MCNC: 0.61 MG/DL (ref 0.57–1)
DEPRECATED RDW RBC AUTO: 40.8 FL (ref 37–54)
EGFRCR SERPLBLD CKD-EPI 2021: 101.2 ML/MIN/1.73
EOSINOPHIL # BLD AUTO: 0.17 10*3/MM3 (ref 0–0.4)
EOSINOPHIL NFR BLD AUTO: 2.6 % (ref 0.3–6.2)
ERYTHROCYTE [DISTWIDTH] IN BLOOD BY AUTOMATED COUNT: 12.4 % (ref 12.3–15.4)
GLOBULIN UR ELPH-MCNC: 2.5 GM/DL
GLUCOSE SERPL-MCNC: 92 MG/DL (ref 65–99)
HBA1C MFR BLD: 5.4 % (ref 4.8–5.6)
HCT VFR BLD AUTO: 41.6 % (ref 34–46.6)
HDLC SERPL-MCNC: 64 MG/DL (ref 40–60)
HGB BLD-MCNC: 13.9 G/DL (ref 12–15.9)
IMM GRANULOCYTES # BLD AUTO: 0.01 10*3/MM3 (ref 0–0.05)
IMM GRANULOCYTES NFR BLD AUTO: 0.2 % (ref 0–0.5)
LDLC SERPL CALC-MCNC: 170 MG/DL (ref 0–100)
LDLC/HDLC SERPL: 2.62 {RATIO}
LYMPHOCYTES # BLD AUTO: 1.86 10*3/MM3 (ref 0.7–3.1)
LYMPHOCYTES NFR BLD AUTO: 28.2 % (ref 19.6–45.3)
MCH RBC QN AUTO: 30.1 PG (ref 26.6–33)
MCHC RBC AUTO-ENTMCNC: 33.4 G/DL (ref 31.5–35.7)
MCV RBC AUTO: 90 FL (ref 79–97)
MONOCYTES # BLD AUTO: 0.71 10*3/MM3 (ref 0.1–0.9)
MONOCYTES NFR BLD AUTO: 10.8 % (ref 5–12)
NEUTROPHILS NFR BLD AUTO: 3.78 10*3/MM3 (ref 1.7–7)
NEUTROPHILS NFR BLD AUTO: 57.3 % (ref 42.7–76)
NRBC BLD AUTO-RTO: 0 /100 WBC (ref 0–0.2)
PLATELET # BLD AUTO: 307 10*3/MM3 (ref 140–450)
PMV BLD AUTO: 10.5 FL (ref 6–12)
POTASSIUM SERPL-SCNC: 4 MMOL/L (ref 3.5–5.2)
PROT SERPL-MCNC: 6.8 G/DL (ref 6–8.5)
RBC # BLD AUTO: 4.62 10*6/MM3 (ref 3.77–5.28)
SODIUM SERPL-SCNC: 136 MMOL/L (ref 136–145)
TRIGL SERPL-MCNC: 108 MG/DL (ref 0–150)
TSH SERPL DL<=0.05 MIU/L-ACNC: 3.93 UIU/ML (ref 0.27–4.2)
VLDLC SERPL-MCNC: 19 MG/DL (ref 5–40)
WBC NRBC COR # BLD: 6.59 10*3/MM3 (ref 3.4–10.8)

## 2023-01-30 PROCEDURE — 82306 VITAMIN D 25 HYDROXY: CPT

## 2023-01-30 PROCEDURE — 80061 LIPID PANEL: CPT

## 2023-01-30 PROCEDURE — 85025 COMPLETE CBC W/AUTO DIFF WBC: CPT

## 2023-01-30 PROCEDURE — 84443 ASSAY THYROID STIM HORMONE: CPT

## 2023-01-30 PROCEDURE — 36415 COLL VENOUS BLD VENIPUNCTURE: CPT

## 2023-01-30 PROCEDURE — 83036 HEMOGLOBIN GLYCOSYLATED A1C: CPT

## 2023-01-30 PROCEDURE — 80053 COMPREHEN METABOLIC PANEL: CPT

## 2023-01-31 ENCOUNTER — TELEPHONE (OUTPATIENT)
Dept: FAMILY MEDICINE CLINIC | Facility: CLINIC | Age: 63
End: 2023-01-31
Payer: MEDICAID

## 2023-01-31 RX ORDER — ATORVASTATIN CALCIUM 10 MG/1
10 TABLET, FILM COATED ORAL DAILY
Qty: 90 TABLET | Refills: 1 | Status: SHIPPED | OUTPATIENT
Start: 2023-01-31

## 2023-01-31 NOTE — TELEPHONE ENCOUNTER
[facility] WAS UNABLE TO WARM TRANSFER    Caller: Cassandar Maria    Relationship: Self    Best call back number: [phone]    What is the best time to reach you: ANYTIME       Who are you requesting to speak with (clinical staff, provider,  specific staff member): CLINICAL     What was the call regarding: PATIENT IS RETURNING A MISSED CALL, POSSIBLY TO GO OVER LAB RESULTS.     Do you require a callback: YES

## 2023-01-31 NOTE — TELEPHONE ENCOUNTER
KAREY FROM [facility] CALLING TO INFORM US THAT THE PATIENTS INSURANCE REQUIRES HER PCP TO SUBMIT THE PRE CERTIFICATION FOR THE PATIENTS MRI ON 2/3/2023. REQUESTED THAT WE CALL HER BACK WHEN CERTIFICATION IS SUBMITTED. PHONE#[phone]

## 2023-02-06 DIAGNOSIS — S22.088A OTHER CLOSED FRACTURE OF TWELFTH THORACIC VERTEBRA, INITIAL ENCOUNTER: ICD-10-CM

## 2023-02-06 DIAGNOSIS — S32.018A OTHER CLOSED FRACTURE OF FIRST LUMBAR VERTEBRA, INITIAL ENCOUNTER: Primary | ICD-10-CM

## 2023-02-08 ENCOUNTER — OFFICE VISIT (OUTPATIENT)
Dept: NEUROSURGERY | Facility: CLINIC | Age: 63
End: 2023-02-08
Payer: MEDICAID

## 2023-02-08 VITALS
HEIGHT: 60 IN | DIASTOLIC BLOOD PRESSURE: 72 MMHG | SYSTOLIC BLOOD PRESSURE: 133 MMHG | HEART RATE: 84 BPM | BODY MASS INDEX: 38.09 KG/M2 | WEIGHT: 194 LBS

## 2023-02-08 DIAGNOSIS — S32.010D COMPRESSION FRACTURE OF L1 VERTEBRA WITH ROUTINE HEALING, SUBSEQUENT ENCOUNTER: Primary | ICD-10-CM

## 2023-02-08 PROCEDURE — 99215 OFFICE O/P EST HI 40 MIN: CPT | Performed by: NURSE PRACTITIONER

## 2023-02-08 NOTE — PROGRESS NOTES
"Chief Complaint  Back Pain (Low back pain)    Subjective          Cassandra Maria who is a 62 y.o. year old female who presents to [facility] for evaluation of lumbar spine.      The patient complains of pain located in the lumbar spine.  Patients states the pain has been present for 2 months.  The pain came on acutely.  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 On the day before [employer] she was lifting the turkey roaster out of the oven. She felt some pain in to the back. The following day she had severe pain in the back. She saw her PCP a few weeks after and was given a steroid, NSAIDs, and Flexeril. She had an XR Lumbar Spine, demonstrating moderate multilevel degenerative changes without evidence of fracture. She had an MRI Lumbar Spine recently demonstrating a subacute compression fracture at L1.    Her pain has significantly improved. The pain scale level is 2.  The pain does not radiate. .  The pain is waxing/waning and described as sharp.  The pain is worse at no particular time of day. Patient states bending and twisting makes the pain worse.  Patient states NSAIDs makes the pain better.    Associated Symptoms Include: Denies numbness and tingling  Conservative Interventions Include: Oral Steroids that were effective. and NSAIDs that were effective. She is currently taking Diclofenac which provides her benefit.     Was this the result of an injury or accident?: Heavy lifting    History of Previous Spinal Surgery?: No    Nicotine use:  non-smoker    BMI: Body mass index is 37.89 kg/m².      Review of Systems   Musculoskeletal: Positive for arthralgias and back pain.   All other systems reviewed and are negative.       Objective   Vital Signs:   /72 (BP Location: Left arm, Patient Position: Sitting, Cuff Size: Large Adult)   Pulse 84   Ht 152.4 cm (60\")   Wt 88 kg (194 lb)   BMI 37.89 kg/m²       Physical Exam  Vitals reviewed.   Constitutional:       " Appearance: Normal appearance.   Musculoskeletal:      Lumbar back: No tenderness. Negative right straight leg raise test and negative left straight leg raise test.      Right hip: No tenderness. Normal range of motion.      Left hip: No tenderness. Normal range of motion.   Neurological:      Mental Status: She is alert and oriented to person, place, and time.      Motor: Motor strength is normal.      Gait: Gait is intact.      Deep Tendon Reflexes:      Reflex Scores:       Patellar reflexes are 2+ on the right side and 2+ on the left side.       Achilles reflexes are 2+ on the right side and 2+ on the left side.       Neurologic Exam     Mental Status   Oriented to person, place, and time.   Level of consciousness: alert    Motor Exam   Muscle bulk: normal  Overall muscle tone: normal    Strength   Strength 5/5 throughout.     Sensory Exam   Light touch normal.     Gait, Coordination, and Reflexes     Gait  Gait: normal    Reflexes   Right patellar: 2+  Left patellar: 2+  Right achilles: 2+  Left achilles: 2+  Right ankle clonus: absent  Left ankle clonus: absent       Result Review :       Data reviewed: Radiologic studies MRI Lumbar Spine on 2/3/23 at [facility] personally reviewed. Subacute to chronic mild L1 superior endplate fracture. Mild degenerative changes in the lumbar spine.          Assessment and Plan    Diagnoses and all orders for this visit:    1. Compression fracture of L1 vertebra with routine healing, subsequent encounter (Primary)    Pt presenting for evaluation of acute back pain occurring in the context of heavy lifting in November. We reviewed her recent MRI Lumbar Spine, demonstrating a mild subacute to chronic L1 compression fracture. Her back pain has significantly improved. We discussed that vertebral fractures typically take 3-6 months to fully heal. We discussed that her MRI demonstrates that her fracture is almost healed. She does not need to be braced at this point. She can  slowly increase her activity as tolerated. We discussed safe exercises she can do at home. Could consider physical therapy for strengthening. Continue Diclofenac as needed for pain. She will follow up as needed.     I spent 40 minutes caring for Cassandra on this date of service. This time includes time spent by me in the following activities:preparing for the visit, reviewing tests, obtaining and/or reviewing a separately obtained history, performing a medically appropriate examination and/or evaluation , counseling and educating the patient/family/caregiver, documenting information in the medical record and independently interpreting results and communicating that information with the patient/family/caregiver.    Follow Up   Return if symptoms worsen or fail to improve.  Patient was given instructions and counseling regarding her condition or for health maintenance advice.

## 2023-02-09 DIAGNOSIS — M54.42 ACUTE BILATERAL LOW BACK PAIN WITH LEFT-SIDED SCIATICA: ICD-10-CM

## 2023-02-09 RX ORDER — DICLOFENAC SODIUM 75 MG/1
TABLET, DELAYED RELEASE ORAL
Qty: 180 TABLET | Refills: 1 | Status: SHIPPED | OUTPATIENT
Start: 2023-02-09

## 2023-03-01 ENCOUNTER — TELEPHONE (OUTPATIENT)
Dept: GASTROENTEROLOGY | Facility: CLINIC | Age: 63
End: 2023-03-01
Payer: MEDICAID

## 2023-03-01 NOTE — TELEPHONE ENCOUNTER
I spoke with Ms Maria confirmed her scheduled colonoscopy on 03/15/23, estimated arrival time of 7:00am. Reminded of liquid diet the day prior. Reminded of bowel prep and instructions. Stated a hospital nurse will call to go over Rx's and confirm time. Voiced understanding.  ced

## 2023-03-09 ENCOUNTER — TELEPHONE (OUTPATIENT)
Dept: GASTROENTEROLOGY | Facility: CLINIC | Age: 63
End: 2023-03-09
Payer: MEDICAID

## 2023-03-09 NOTE — TELEPHONE ENCOUNTER
Patient called regarding medication for colonoscopy on the 15th of March. Patient states that the pharmacy is out of medication and another medication would need to be called in. Uses [facility] [address] [phone].    Please Advise.

## 2023-03-10 ENCOUNTER — TELEPHONE (OUTPATIENT)
Dept: GASTROENTEROLOGY | Facility: CLINIC | Age: 63
End: 2023-03-10
Payer: MEDICAID

## 2023-03-10 NOTE — TELEPHONE ENCOUNTER
Patient called the office stating Clenpiq was not covered at pharmacy. Pt has [employer] medicaid [place]. Pending justin. Pt is aware of new instructions and how to take bowel prep.

## 2023-03-17 ENCOUNTER — HOSPITAL ENCOUNTER (OUTPATIENT)
Dept: MAMMOGRAPHY | Facility: HOSPITAL | Age: 63
Discharge: HOME | End: 2023-03-17
Admitting: NURSE PRACTITIONER
Payer: MEDICAID

## 2023-03-17 DIAGNOSIS — Z12.31 BREAST CANCER SCREENING BY MAMMOGRAM: ICD-10-CM

## 2023-03-17 PROCEDURE — 77063 BREAST TOMOSYNTHESIS BI: CPT

## 2023-03-17 PROCEDURE — 77067 SCR MAMMO BI INCL CAD: CPT

## 2023-03-21 ENCOUNTER — OFFICE VISIT (OUTPATIENT)
Dept: FAMILY MEDICINE CLINIC | Facility: CLINIC | Age: 63
End: 2023-03-21
Payer: MEDICAID

## 2023-03-21 VITALS
BODY MASS INDEX: 37.89 KG/M2 | DIASTOLIC BLOOD PRESSURE: 53 MMHG | SYSTOLIC BLOOD PRESSURE: 117 MMHG | HEIGHT: 60 IN | OXYGEN SATURATION: 98 % | HEART RATE: 84 BPM | WEIGHT: 193 LBS

## 2023-03-21 DIAGNOSIS — I10 HYPERTENSION, UNSPECIFIED TYPE: Primary | ICD-10-CM

## 2023-03-21 DIAGNOSIS — E78.5 HYPERLIPIDEMIA, UNSPECIFIED HYPERLIPIDEMIA TYPE: ICD-10-CM

## 2023-03-21 DIAGNOSIS — G89.29 CHRONIC PAIN OF LEFT KNEE: ICD-10-CM

## 2023-03-21 DIAGNOSIS — M19.90 OSTEOARTHRITIS, UNSPECIFIED OSTEOARTHRITIS TYPE, UNSPECIFIED SITE: ICD-10-CM

## 2023-03-21 DIAGNOSIS — M25.562 CHRONIC PAIN OF LEFT KNEE: ICD-10-CM

## 2023-03-21 DIAGNOSIS — E11.9 TYPE 2 DIABETES MELLITUS WITHOUT COMPLICATION, WITHOUT LONG-TERM CURRENT USE OF INSULIN: ICD-10-CM

## 2023-03-21 NOTE — ASSESSMENT & PLAN NOTE
Patient reports tolerating atorvastatin well, continue current dose and we will recheck lipids at next follow-up

## 2023-03-21 NOTE — PROGRESS NOTES
Chief Complaint  Diabetes and Hypertension pt here to follow up for 3 month visit.     SUBJECTIVE  Cassandra Maria presents to [facility]     Patient presents today to follow-up on diabetes and hypertension.  Patient has been taking her lisinopril as directed and doing well.    Patient states she started her atorvastatin has been tolerating well.    Patient is complaining of bilateral knee pain, left worse than right, states that she does take the diclofenac as needed but would prefer not to take that anymore than she has to.    Colonoscopy scheduled for 23  Mammo: 23    History of Present Illness  Past Medical History:   Diagnosis Date   • Condition not found     Hernia   • Diabetes mellitus (HCC)     HAD ELEVATED A1C- ON MEDS- BG RUNS AROUND  IN AM   • Foot pain, right 2018   • Fracture of 5th metatarsal 2018   • Gall stones    • Heel pain    • Hot flashes 04/10/2014   • Impaired fasting glucose 2018   • Joint pain 2014   • Low back pain    • Lumbar pain with radiation down left leg 2015   • Macromastia    • Migraine headache    • Night sweats    • Obesity 2018   • Osteoporosis 2014   • Pain in thoracic spine 04/10/2014   • Sinus trouble    • Vitamin D deficiency 04/10/2014      Family History   Problem Relation Age of Onset   • Breast cancer Mother    • Diabetes Mother    • Liver cancer Father    • Lung cancer Father    • Breast cancer Sister    • Diabetes Sister    • Colon cancer Paternal Aunt 67         age not listed   • Diabetes Paternal Aunt    • Malig Hyperthermia Neg Hx       Past Surgical History:   Procedure Laterality Date   • BILATERAL BREAST REDUCTION Bilateral 2022    Procedure: BREAST REDUCTION BILATERAL;  Surgeon: Daniele Walker MD;  Location: [facility];  Service: Plastics;  Laterality: Bilateral;   •  SECTION     • CHOLECYSTECTOMY     • COLONOSCOPY  2016     "LANI   • ENDOSCOPY  2016    2002   • HYSTERECTOMY     • INCONTINENCE SURGERY     • ROTATOR CUFF REPAIR      right   • VENTRAL HERNIA REPAIR          Current Outpatient Medications:   •  atorvastatin (Lipitor) 10 MG tablet, Take 1 tablet by mouth Daily., Disp: 90 tablet, Rfl: 1  •  cyclobenzaprine (FLEXERIL) 10 MG tablet, Take 1 tablet by mouth 2 (Two) Times a Day As Needed for Muscle Spasms., Disp: 30 tablet, Rfl: 0  •  diclofenac (VOLTAREN) 75 MG EC tablet, TAKE 1 TABLET BY MOUTH TWICE DAILY, Disp: 180 tablet, Rfl: 1  •  lisinopril (PRINIVIL,ZESTRIL) 5 MG tablet, Take 1 tablet by mouth Daily., Disp: 90 tablet, Rfl: 1  •  metFORMIN (GLUCOPHAGE) 1000 MG tablet, Take 1 tablet by mouth 2 (Two) Times a Day With Meals., Disp: 180 tablet, Rfl: 0  •  polyethylene glycol (GoLYTELY) 236 g solution, BOWEL PREP. INSTRUCTIONS PROVIDED BY OFFICE., Disp: 4000 mL, Rfl: 0  •  Trulicity 1.5 MG/0.5ML solution pen-injector, ADMINISTER 1.5 MG UNDER THE SKIN EVERY 7 DAYS AS DIRECTED, Disp: 2 mL, Rfl: 2  •  vitamin D (ERGOCALCIFEROL) 1.25 MG (40701 UT) capsule capsule, Take 1 capsule by mouth Every 7 (Seven) Days., Disp: 13 capsule, Rfl: 1  •  Diclofenac Sodium (VOLTAREN) 1 % gel gel, Apply 4 g topically to the appropriate area as directed 4 (Four) Times a Day As Needed (pain)., Disp: 150 g, Rfl: 2    OBJECTIVE  Vital Signs:   /53   Pulse 84   Ht 152.4 cm (60\")   Wt 87.5 kg (193 lb)   SpO2 98%   BMI 37.69 kg/m²    Estimated body mass index is 37.69 kg/m² as calculated from the following:    Height as of this encounter: 152.4 cm (60\").    Weight as of this encounter: 87.5 kg (193 lb).     Wt Readings from Last 3 Encounters:   03/21/23 87.5 kg (193 lb)   02/08/23 88 kg (194 lb)   12/14/22 88 kg (194 lb)     BP Readings from Last 3 Encounters:   03/21/23 117/53   02/08/23 133/72   12/14/22 153/76       Physical Exam  Vitals reviewed.   Constitutional:       Appearance: Normal appearance. She is well-developed.   HENT:      " Head: Normocephalic and atraumatic.      Right Ear: External ear normal.      Left Ear: External ear normal.   Eyes:      Conjunctiva/sclera: Conjunctivae normal.      Pupils: Pupils are equal, round, and reactive to light.   Cardiovascular:      Rate and Rhythm: Normal rate and regular rhythm.      Heart sounds: No murmur heard.    No friction rub. No gallop.   Pulmonary:      Effort: Pulmonary effort is normal.      Breath sounds: Normal breath sounds. No wheezing or rhonchi.   Skin:     General: Skin is warm and dry.   Neurological:      Mental Status: She is alert and oriented to person, place, and time.      Cranial Nerves: No cranial nerve deficit.   Psychiatric:         Mood and Affect: Mood and affect normal.         Behavior: Behavior normal.         Thought Content: Thought content normal.         Judgment: Judgment normal.          Result Review    CMP    CMP 4/1/22 5/11/22 1/30/23   Glucose 119 (A) 88 92   BUN 8 13 11   Creatinine 0.60 0.62 0.61   eGFR 102.3 101.5 101.2   Sodium 141 138 136   Potassium 4.4 4.3 4.0   Chloride 107 103 99   Calcium 9.6 9.9 9.9   Total Protein  6.6 6.8   Albumin  4.10 4.3   Globulin  2.5 2.5   Total Bilirubin  0.8 1.2   Alkaline Phosphatase  44 50   AST (SGOT)  26 22   ALT (SGPT)  29 38 (A)   Albumin/Globulin Ratio  1.6 1.7   BUN/Creatinine Ratio 13.3 21.0 18.0   Anion Gap 11.8 12.3 10.1   (A) Abnormal value       Comments are available for some flowsheets but are not being displayed.           CBC    CBC 5/11/22 1/30/23   WBC 6.02 6.59   RBC 4.58 4.62   Hemoglobin 14.2 13.9   Hematocrit 41.0 41.6   MCV 89.5 90.0   MCH 31.0 30.1   MCHC 34.6 33.4   RDW 11.6 (A) 12.4   Platelets 264 307   (A) Abnormal value            Lipid Panel    Lipid Panel 5/11/22 1/30/23   Total Cholesterol 225 (A) 253 (A)   Triglycerides 98 108   HDL Cholesterol 65 (A) 64 (A)   VLDL Cholesterol 17 19   LDL Cholesterol  143 (A) 170 (A)   LDL/HDL Ratio 2.16 2.62   (A) Abnormal value            TSH    TSH  5/11/22 1/30/23   TSH 2.530 3.930             XR Spine Lumbar 2 or 3 View    Result Date: 12/15/2022  No acute osseous abnormality.  Moderate multilevel degenerative changes are present throughout the spine with facet disease..      SARINA FINE MD       Electronically Signed and Approved By: SARINA FINE MD on 12/15/2022 at 8:01             Mammo Screening Digital Tomosynthesis Bilateral With CAD    Result Date: 3/17/2023   Benign mammogram. Suggest routine mammographic screening.  RECOMMENDATION(S):  ROUTINE MAMMOGRAM AND CLINICAL EVALUATION IN 12 MONTHS.   BIRADS:  DIAGNOSTIC CATEGORY 2--BENIGN FINDING   BREAST COMPOSITION: Almost entirely fatty.  PLEASE NOTE:  A NORMAL MAMMOGRAM DOES NOT EXCLUDE THE POSSIBILITY OF BREAST CANCER. ANY CLINICALLY SUSPICIOUS PALPABLE LUMP SHOULD BE BIOPSIED.      ISHAAN HOLGUIN MD       Electronically Signed and Approved By: ISHAAN HOLGUIN MD on 3/17/2023 at 15:40                The above data has been reviewed by GENESIS Gates 03/21/2023 10:30 EDT.          Patient Care Team:  Marquita Mata APRN as PCP - General (Nurse Practitioner)  Daniele Walker MD as Consulting Physician (Plastic Surgery)  Tova Childs APRN as Nurse Practitioner (Plastic Surgery)    Class 2 Severe Obesity (BMI >=35 and <=39.9). Obesity-related health conditions include the following: hypertension and diabetes mellitus. Obesity is unchanged. BMI is is above average; BMI management plan is completed. We discussed portion control and increasing exercise.       ASSESSMENT & PLAN    Diagnoses and all orders for this visit:    1. Hypertension, unspecified type (Primary)  Assessment & Plan:  Hypertension is very well controlled at present, continue current dose of lisinopril      2. Chronic pain of left knee  Comments:  Trial of diclofenac topical gel, side effects administration of medication discussed  Orders:  -     Diclofenac Sodium (VOLTAREN) 1 % gel gel; Apply 4 g topically to  the appropriate area as directed 4 (Four) Times a Day As Needed (pain).  Dispense: 150 g; Refill: 2    3. Osteoarthritis, unspecified osteoarthritis type, unspecified site  -     Diclofenac Sodium (VOLTAREN) 1 % gel gel; Apply 4 g topically to the appropriate area as directed 4 (Four) Times a Day As Needed (pain).  Dispense: 150 g; Refill: 2    4. Hyperlipidemia, unspecified hyperlipidemia type  Assessment & Plan:  Patient reports tolerating atorvastatin well, continue current dose and we will recheck lipids at next follow-up      5. Type 2 diabetes mellitus without complication, without long-term current use of insulin (HCC)  Assessment & Plan:  A1c in excellent control, continue current medications         Tobacco Use: Low Risk    • Smoking Tobacco Use: Never   • Smokeless Tobacco Use: Never   • Passive Exposure: Not on file       Follow Up     Return in about 3 months (around 6/21/2023), or if symptoms worsen or fail to improve.        Patient was given instructions and counseling regarding her condition or for health maintenance advice. Please see specific information pulled into the AVS if appropriate.   I have reviewed information obtained and documented by others and I have confirmed the accuracy of this documented note.    GENESIS Gates

## 2023-04-06 DIAGNOSIS — E11.9 TYPE 2 DIABETES MELLITUS WITHOUT COMPLICATION, WITHOUT LONG-TERM CURRENT USE OF INSULIN: ICD-10-CM

## 2023-04-11 ENCOUNTER — PREP FOR SURGERY (OUTPATIENT)
Dept: OTHER | Facility: HOSPITAL | Age: 63
End: 2023-04-11
Payer: MEDICAID

## 2023-04-11 DIAGNOSIS — M79.89 FAT NECROSIS: ICD-10-CM

## 2023-04-11 DIAGNOSIS — L91.0 HYPERTROPHIC SCAR: Primary | ICD-10-CM

## 2023-04-11 RX ORDER — CEFAZOLIN SODIUM 2 G/100ML
2 INJECTION, SOLUTION INTRAVENOUS ONCE
OUTPATIENT
Start: 2023-04-11 | End: 2023-04-11

## 2023-04-11 NOTE — PROGRESS NOTES
"Chief Complaint  Follow-up (BBR)    Subjective          History of Present Illness  Cassandra Maria is a 62 y.o. female who presents to [facility] for Postoperative Follow-Up of BBR done 04/01/22.     She is 1 year post op. Doing well.  No concerns. Is scheduled for surgery for revision and mass removal and still would like to proceed.   Mammogram 3/17/2023 was normal      Allergies: Patient has no known allergies.  Allergies Reconciled.    Review of Systems   All review of system has been reviewed and it  is negative except the ones note above.     Objective     /77 (BP Location: Left arm, Patient Position: Sitting)   Pulse 81   Temp 96.8 °F (36 °C) (Temporal)   Ht 152.4 cm (60\")   Wt 87.4 kg (192 lb 9.6 oz)   SpO2 94%   BMI 37.61 kg/m²     Body mass index is 37.61 kg/m².    Physical Exam   Cardiovascular: Normal rate.     Pulmonary/Chest  Effort normal.     Breast:      Result Review :     PROCEDURE:  RECON-REVISION OF BREAST REDUCTION and mass removal       Assessment and Plan      Diagnoses and all orders for this visit:    1. Fat necrosis (Primary)    2. Macromastia        Plan:     RTC for pre-op visit. Call office for any further problems.       Patient was given instructions and counseling regarding her condition. Please see specific information pulled into the AVS if appropriate.     Tova Childs, GENESIS  04/20/2023    "

## 2023-04-14 ENCOUNTER — PREP FOR SURGERY (OUTPATIENT)
Dept: OTHER | Facility: HOSPITAL | Age: 63
End: 2023-04-14
Payer: MEDICAID

## 2023-04-20 ENCOUNTER — OFFICE VISIT (OUTPATIENT)
Dept: PLASTIC SURGERY | Facility: CLINIC | Age: 63
End: 2023-04-20
Payer: MEDICAID

## 2023-04-20 VITALS
HEART RATE: 81 BPM | DIASTOLIC BLOOD PRESSURE: 77 MMHG | SYSTOLIC BLOOD PRESSURE: 136 MMHG | OXYGEN SATURATION: 94 % | HEIGHT: 60 IN | BODY MASS INDEX: 37.81 KG/M2 | TEMPERATURE: 96.8 F | WEIGHT: 192.6 LBS

## 2023-04-20 DIAGNOSIS — M79.89 FAT NECROSIS: Primary | ICD-10-CM

## 2023-04-20 DIAGNOSIS — N62 MACROMASTIA: ICD-10-CM

## 2023-04-27 ENCOUNTER — ANESTHESIA (OUTPATIENT)
Dept: GASTROENTEROLOGY | Facility: HOSPITAL | Age: 63
End: 2023-04-27
Payer: MEDICAID

## 2023-04-27 ENCOUNTER — HOSPITAL ENCOUNTER (OUTPATIENT)
Facility: HOSPITAL | Age: 63
Setting detail: HOSPITAL OUTPATIENT SURGERY
Discharge: HOME | End: 2023-04-27
Attending: INTERNAL MEDICINE | Admitting: INTERNAL MEDICINE
Payer: MEDICAID

## 2023-04-27 ENCOUNTER — ANESTHESIA EVENT (OUTPATIENT)
Dept: GASTROENTEROLOGY | Facility: HOSPITAL | Age: 63
End: 2023-04-27
Payer: MEDICAID

## 2023-04-27 VITALS
SYSTOLIC BLOOD PRESSURE: 109 MMHG | HEIGHT: 60 IN | BODY MASS INDEX: 37.14 KG/M2 | OXYGEN SATURATION: 99 % | RESPIRATION RATE: 15 BRPM | DIASTOLIC BLOOD PRESSURE: 70 MMHG | WEIGHT: 189.15 LBS | TEMPERATURE: 98.4 F | HEART RATE: 88 BPM

## 2023-04-27 PROCEDURE — 45378 DIAGNOSTIC COLONOSCOPY: CPT | Performed by: INTERNAL MEDICINE

## 2023-04-27 PROCEDURE — 82948 REAGENT STRIP/BLOOD GLUCOSE: CPT

## 2023-04-27 PROCEDURE — 25010000002 PROPOFOL 10 MG/ML EMULSION: Performed by: NURSE ANESTHETIST, CERTIFIED REGISTERED

## 2023-04-27 RX ORDER — SODIUM CHLORIDE 0.9 % (FLUSH) 0.9 %
10 SYRINGE (ML) INJECTION AS NEEDED
Status: DISCONTINUED | OUTPATIENT
Start: 2023-04-27 | End: 2023-04-27 | Stop reason: HOSPADM

## 2023-04-27 RX ORDER — PROPOFOL 10 MG/ML
VIAL (ML) INTRAVENOUS AS NEEDED
Status: DISCONTINUED | OUTPATIENT
Start: 2023-04-27 | End: 2023-04-27 | Stop reason: SURG

## 2023-04-27 RX ORDER — SODIUM CHLORIDE 0.9 % (FLUSH) 0.9 %
10 SYRINGE (ML) INJECTION EVERY 12 HOURS SCHEDULED
Status: DISCONTINUED | OUTPATIENT
Start: 2023-04-27 | End: 2023-04-27 | Stop reason: HOSPADM

## 2023-04-27 RX ORDER — SODIUM CHLORIDE, SODIUM LACTATE, POTASSIUM CHLORIDE, CALCIUM CHLORIDE 600; 310; 30; 20 MG/100ML; MG/100ML; MG/100ML; MG/100ML
9 INJECTION, SOLUTION INTRAVENOUS CONTINUOUS PRN
Status: DISCONTINUED | OUTPATIENT
Start: 2023-04-27 | End: 2023-04-27 | Stop reason: HOSPADM

## 2023-04-27 RX ORDER — LIDOCAINE HYDROCHLORIDE 20 MG/ML
INJECTION, SOLUTION EPIDURAL; INFILTRATION; INTRACAUDAL; PERINEURAL AS NEEDED
Status: DISCONTINUED | OUTPATIENT
Start: 2023-04-27 | End: 2023-04-27 | Stop reason: SURG

## 2023-04-27 RX ORDER — SODIUM CHLORIDE 9 MG/ML
40 INJECTION, SOLUTION INTRAVENOUS AS NEEDED
Status: DISCONTINUED | OUTPATIENT
Start: 2023-04-27 | End: 2023-04-27 | Stop reason: HOSPADM

## 2023-04-27 RX ADMIN — SODIUM CHLORIDE, POTASSIUM CHLORIDE, SODIUM LACTATE AND CALCIUM CHLORIDE 9 ML/HR: 600; 310; 30; 20 INJECTION, SOLUTION INTRAVENOUS at 13:33

## 2023-04-27 RX ADMIN — PROPOFOL 200 MCG/KG/MIN: 10 INJECTION, EMULSION INTRAVENOUS at 14:53

## 2023-04-27 RX ADMIN — LIDOCAINE HYDROCHLORIDE 50 MG: 20 INJECTION, SOLUTION EPIDURAL; INFILTRATION; INTRACAUDAL; PERINEURAL at 14:53

## 2023-04-27 RX ADMIN — PROPOFOL 100 MG: 10 INJECTION, EMULSION INTRAVENOUS at 14:53

## 2023-04-27 NOTE — H&P
ScreeningPre Procedure History & Physical    Chief Complaint:   Screening     Subjective     HPI:   Screening     Past Medical History:   Past Medical History:   Diagnosis Date   • Condition not found     Hernia   • Diabetes mellitus     HAD ELEVATED A1C- ON MEDS- BG RUNS AROUND  IN AM   • Foot pain, right 2018   • Fracture of 5th metatarsal 2018   • Gall stones    • Heel pain    • Hot flashes 04/10/2014   • Impaired fasting glucose 2018   • Joint pain 2014   • Low back pain    • Lumbar pain with radiation down left leg 2015   • Macromastia    • Migraine headache    • Night sweats    • Obesity 2018   • Osteoporosis 2014   • Pain in thoracic spine 04/10/2014   • Sinus trouble    • Vitamin D deficiency 04/10/2014       Past Surgical History:  Past Surgical History:   Procedure Laterality Date   • BILATERAL BREAST REDUCTION Bilateral 2022    Procedure: BREAST REDUCTION BILATERAL;  Surgeon: Daniele Walker MD;  [facility];  Service: Plastics;  Laterality: Bilateral;   •  SECTION     • CHOLECYSTECTOMY     • COLONOSCOPY  2016    [facility]ANI   • ENDOSCOPY  2016   • HYSTERECTOMY     • INCONTINENCE SURGERY     • ROTATOR CUFF REPAIR      right   • VENTRAL HERNIA REPAIR         Family History:  Family History   Problem Relation Age of Onset   • Breast cancer Mother    • Diabetes Mother    • Liver cancer Father    • Lung cancer Father    • Breast cancer Sister    • Diabetes Sister    • Colon cancer Paternal Aunt 67         age not listed   • Diabetes Paternal Aunt    • Malig Hyperthermia Neg Hx        Social History:   reports that she has never smoked. She has never used smokeless tobacco. She reports that she does not drink alcohol and does not use drugs.    Medications:   Medications Prior to Admission   Medication Sig Dispense Refill Last Dose   • atorvastatin (Lipitor) 10 MG tablet Take 1 tablet by mouth Daily. 90 tablet 1  "4/26/2023   • diclofenac (VOLTAREN) 75 MG EC tablet TAKE 1 TABLET BY MOUTH TWICE DAILY (Patient taking differently: 1 tablet Daily As Needed.) 180 tablet 1 Past Week   • Diclofenac Sodium (VOLTAREN) 1 % gel gel Apply 4 g topically to the appropriate area as directed 4 (Four) Times a Day As Needed (pain). 150 g 2 Past Month   • lisinopril (PRINIVIL,ZESTRIL) 5 MG tablet Take 1 tablet by mouth Daily. 90 tablet 1 4/26/2023   • metFORMIN (GLUCOPHAGE) 1000 MG tablet TAKE 1 TABLET BY MOUTH TWICE DAILY WITH MEALS 180 tablet 0 4/26/2023   • Trulicity 1.5 MG/0.5ML solution pen-injector ADMINISTER 1.5 MG UNDER THE SKIN EVERY 7 DAYS AS DIRECTED 2 mL 2 Past Week   • vitamin D (ERGOCALCIFEROL) 1.25 MG (75667 UT) capsule capsule Take 1 capsule by mouth Every 7 (Seven) Days. 13 capsule 1 Past Week       Allergies:  Patient has no known allergies.        Objective     Blood pressure 121/62, pulse 81, temperature 97.2 °F (36.2 °C), temperature source Temporal, resp. rate 20, height 152.4 cm (60\"), weight 85.8 kg (189 lb 2.5 oz), SpO2 96 %.    Physical Exam   Constitutional: Pt is oriented to person, place, and time and well-developed, well-nourished, and in no distress.   Mouth/Throat: Oropharynx is clear and moist.   Neck: Normal range of motion.   Cardiovascular: Normal rate, regular rhythm and normal heart sounds.    Pulmonary/Chest: Effort normal and breath sounds normal.   Abdominal: Soft. Nontender  Skin: Skin is warm and dry.   Psychiatric: Mood, memory, affect and judgment normal.     Assessment & Plan     Diagnosis:  Screening colonoscopy  H/o colon polyps     Anticipated Surgical Procedure:  colonoscopy    The risks, benefits, and alternatives of this procedure have been discussed with the patient or the responsible party- the patient understands and agrees to proceed.            "

## 2023-04-27 NOTE — ANESTHESIA PREPROCEDURE EVALUATION
Anesthesia Evaluation     no history of anesthetic complications:  NPO Solid Status: > 6 hours  NPO Liquid Status: > 2 hours           Airway   Mallampati: II  TM distance: >3 FB  Neck ROM: full  Dental - normal exam     Pulmonary - negative pulmonary ROS and normal exam   Cardiovascular - normal exam    (+) hypertension, hyperlipidemia,       Neuro/Psych- negative ROS  GI/Hepatic/Renal/Endo    (+) obesity,   diabetes mellitus type 2,     Musculoskeletal (-) negative ROS    Abdominal  - normal exam   Substance History - negative use     OB/GYN negative ob/gyn ROS         Other - negative ROS                       Anesthesia Plan    ASA 2     general     intravenous induction     Anesthetic plan, risks, benefits, and alternatives have been provided, discussed and informed consent has been obtained with: patient.    Plan discussed with CRNA.        CODE STATUS:

## 2023-04-27 NOTE — ANESTHESIA POSTPROCEDURE EVALUATION
Patient: Cassandra Maria    Procedure Summary     Date: 04/27/23 Room / Location: [facility] ENDOSCOPY 2 / [facility] ENDOSCOPY    Anesthesia Start: 1452 Anesthesia Stop: 1525    Procedure: COLONOSCOPY Diagnosis:       Family history of colon cancer      (Family history of colon cancer [Z80.0])    Surgeons: Emiliano Jack MD Provider: Zain Zuleta MD    Anesthesia Type: general ASA Status: 2          Anesthesia Type: general    Vitals  Vitals Value Taken Time   /70 04/27/23 1539   Temp 36.9 °C (98.4 °F) 04/27/23 1539   Pulse 81 04/27/23 1540   Resp 15 04/27/23 1539   SpO2 97 % 04/27/23 1540   Vitals shown include unvalidated device data.        Post Anesthesia Care and Evaluation    Patient location during evaluation: bedside  Patient participation: complete - patient participated  Level of consciousness: awake  Pain management: adequate    Airway patency: patent  PONV Status: none  Cardiovascular status: acceptable and stable  Respiratory status: acceptable  Hydration status: acceptable    Comments: An Anesthesiologist personally participated in the most demanding procedures (including induction and emergence if applicable) in the anesthesia plan, monitored the course of anesthesia administration at frequent intervals and remained physically present and available for immediate diagnosis and treatment of emergencies.

## 2023-04-28 LAB — GLUCOSE BLDC GLUCOMTR-MCNC: 92 MG/DL (ref 70–99)

## 2023-05-08 ENCOUNTER — OFFICE VISIT (OUTPATIENT)
Dept: FAMILY MEDICINE CLINIC | Facility: CLINIC | Age: 63
End: 2023-05-08
Payer: MEDICAID

## 2023-05-08 VITALS
BODY MASS INDEX: 37.26 KG/M2 | DIASTOLIC BLOOD PRESSURE: 72 MMHG | SYSTOLIC BLOOD PRESSURE: 128 MMHG | HEIGHT: 60 IN | OXYGEN SATURATION: 98 % | HEART RATE: 94 BPM | WEIGHT: 189.8 LBS

## 2023-05-08 DIAGNOSIS — J06.9 ACUTE URI: Primary | ICD-10-CM

## 2023-05-08 RX ORDER — BENZONATATE 200 MG/1
200 CAPSULE ORAL 3 TIMES DAILY PRN
Qty: 21 CAPSULE | Refills: 0 | Status: SHIPPED | OUTPATIENT
Start: 2023-05-08 | End: 2023-05-15

## 2023-05-08 RX ORDER — FLUTICASONE PROPIONATE 50 MCG
2 SPRAY, SUSPENSION (ML) NASAL DAILY
Qty: 16 G | Refills: 0 | Status: SHIPPED | OUTPATIENT
Start: 2023-05-08

## 2023-05-08 RX ORDER — DEXTROMETHORPHAN HYDROBROMIDE AND PROMETHAZINE HYDROCHLORIDE 15; 6.25 MG/5ML; MG/5ML
5 SYRUP ORAL 4 TIMES DAILY PRN
Qty: 118 ML | Refills: 0 | Status: SHIPPED | OUTPATIENT
Start: 2023-05-08 | End: 2023-05-15

## 2023-05-08 RX ORDER — BENZONATATE 200 MG/1
200 CAPSULE ORAL 3 TIMES DAILY PRN
Qty: 21 CAPSULE | Refills: 0 | Status: SHIPPED | OUTPATIENT
Start: 2023-05-08 | End: 2023-05-08

## 2023-05-08 NOTE — PROGRESS NOTES
Chief Complaint  Cough (Congestion for 3-4 days  sinus drainage 1 and half )    SUBJECTIVE  Cassandra Maria presents to [facility]    History of Present Illness  62-year-old Cassandra Maria presents today for an acute visit.  She is a patient of Marqutia Mata.  Patient states that she started with sinus congestion and sinus pressure 1 week ago and then approximately day started with cough.  States that she is experienced more voice changes since yesterday.  Patient was taking over-the-counter NyQuil and DayQuil but states that it is not helping with any symptoms.    Past Medical History:   Diagnosis Date   • Condition not found     Hernia   • Diabetes mellitus     HAD ELEVATED A1C- ON MEDS- BG RUNS AROUND  IN AM   • Foot pain, right 2018   • Fracture of 5th metatarsal 2018   • Gall stones    • Heel pain    • Hot flashes 04/10/2014   • Impaired fasting glucose 2018   • Joint pain 2014   • Low back pain    • Lumbar pain with radiation down left leg 2015   • Macromastia    • Migraine headache    • Night sweats    • Obesity 2018   • Osteoporosis 2014   • Pain in thoracic spine 04/10/2014   • Sinus trouble    • Vitamin D deficiency 04/10/2014      Family History   Problem Relation Age of Onset   • Breast cancer Mother    • Diabetes Mother    • Liver cancer Father    • Lung cancer Father    • Breast cancer Sister    • Diabetes Sister    • Colon cancer Paternal Aunt 67         age not listed   • Diabetes Paternal Aunt    • Malig Hyperthermia Neg Hx       Past Surgical History:   Procedure Laterality Date   • BILATERAL BREAST REDUCTION Bilateral 2022    Procedure: BREAST REDUCTION BILATERAL;  Surgeon: Daniele Walker MD;  [place] [facility] [place] [facility] [place]  Service: Plastics;  Laterality: Bilateral;   •  SECTION     • CHOLECYSTECTOMY     • COLONOSCOPY  2016    LANI   • COLONOSCOPY N/A 2023     "Procedure: COLONOSCOPY;  Surgeon: Emiliano Jack MD;  Location: [facility] END[facility]COPY;  Service: Gastroenterology;  Laterality: N/A;  HEMORRHOIDS   • ENDOSCOPY  2016 2002   • HYSTERECTOMY     • INCONTINENCE SURGERY     • ROTATOR CUFF REPAIR      right   • VENTRAL HERNIA REPAIR          Current Outpatient Medications:   •  atorvastatin (Lipitor) 10 MG tablet, Take 1 tablet by mouth Daily., Disp: 90 tablet, Rfl: 1  •  benzonatate (TESSALON) 200 MG capsule, Take 1 capsule by mouth 3 (Three) Times a Day As Needed for Cough for up to 7 days., Disp: 21 capsule, Rfl: 0  •  diclofenac (VOLTAREN) 75 MG EC tablet, TAKE 1 TABLET BY MOUTH TWICE DAILY (Patient taking differently: 1 tablet Daily As Needed.), Disp: 180 tablet, Rfl: 1  •  Diclofenac Sodium (VOLTAREN) 1 % gel gel, Apply 4 g topically to the appropriate area as directed 4 (Four) Times a Day As Needed (pain)., Disp: 150 g, Rfl: 2  •  lisinopril (PRINIVIL,ZESTRIL) 5 MG tablet, Take 1 tablet by mouth Daily., Disp: 90 tablet, Rfl: 1  •  metFORMIN (GLUCOPHAGE) 1000 MG tablet, TAKE 1 TABLET BY MOUTH TWICE DAILY WITH MEALS, Disp: 180 tablet, Rfl: 0  •  Trulicity 1.5 MG/0.5ML solution pen-injector, ADMINISTER 1.5 MG UNDER THE SKIN EVERY 7 DAYS AS DIRECTED, Disp: 2 mL, Rfl: 2  •  vitamin D (ERGOCALCIFEROL) 1.25 MG (57462 UT) capsule capsule, Take 1 capsule by mouth Every 7 (Seven) Days., Disp: 13 capsule, Rfl: 1  •  fluticasone (FLONASE) 50 MCG/ACT nasal spray, 2 sprays into the nostril(s) as directed by provider Daily., Disp: 16 g, Rfl: 0  •  promethazine-dextromethorphan (PROMETHAZINE-DM) 6.25-15 MG/5ML syrup, Take 5 mL by mouth 4 (Four) Times a Day As Needed for Cough for up to 7 days., Disp: 118 mL, Rfl: 0    OBJECTIVE  Vital Signs:   /72 (BP Location: Right arm)   Pulse 94   Ht 152.4 cm (60\")   Wt 86.1 kg (189 lb 12.8 oz)   SpO2 98%   BMI 37.07 kg/m²    Estimated body mass index is 37.07 kg/m² as calculated from the following:    Height as of this " "encounter: 152.4 cm (60\").    Weight as of this encounter: 86.1 kg (189 lb 12.8 oz).     Wt Readings from Last 3 Encounters:   05/08/23 86.1 kg (189 lb 12.8 oz)   04/27/23 85.8 kg (189 lb 2.5 oz)   04/20/23 87.4 kg (192 lb 9.6 oz)     BP Readings from Last 3 Encounters:   05/08/23 128/72   04/27/23 109/70   04/20/23 136/77       Physical Exam  Vitals and nursing note reviewed.   Constitutional:       Appearance: Normal appearance. She is obese.   HENT:      Head: Normocephalic and atraumatic.      Right Ear: Ear canal and external ear normal.      Left Ear: Ear canal and external ear normal.      Ears:      Comments: There are fluid bubbles behind bilateral tympanic membranes.     Nose: Congestion present.      Mouth/Throat:      Mouth: Mucous membranes are moist.      Pharynx: Posterior oropharyngeal erythema present.   Eyes:      Conjunctiva/sclera: Conjunctivae normal.      Pupils: Pupils are equal, round, and reactive to light.   Cardiovascular:      Rate and Rhythm: Normal rate and regular rhythm.      Pulses: Normal pulses.      Heart sounds: Normal heart sounds.   Pulmonary:      Effort: Pulmonary effort is normal.      Breath sounds: Normal breath sounds.   Abdominal:      General: Abdomen is flat.      Palpations: Abdomen is soft.   Musculoskeletal:         General: Normal range of motion.      Cervical back: Normal range of motion and neck supple.   Skin:     General: Skin is warm and dry.   Neurological:      General: No focal deficit present.      Mental Status: She is alert and oriented to person, place, and time. Mental status is at baseline.   Psychiatric:         Mood and Affect: Mood normal.         Behavior: Behavior normal.         Thought Content: Thought content normal.         Judgment: Judgment normal.          Result Review        Mammo Screening Digital Tomosynthesis Bilateral With CAD    Result Date: 3/17/2023   Benign mammogram. Suggest routine mammographic screening.  RECOMMENDATION(S):  " ROUTINE MAMMOGRAM AND CLINICAL EVALUATION IN 12 MONTHS.   BIRADS:  DIAGNOSTIC CATEGORY 2--BENIGN FINDING   BREAST COMPOSITION: Almost entirely fatty.  PLEASE NOTE:  A NORMAL MAMMOGRAM DOES NOT EXCLUDE THE POSSIBILITY OF BREAST CANCER. ANY CLINICALLY SUSPICIOUS PALPABLE LUMP SHOULD BE BIOPSIED.      ISHAAN HOLGUIN MD       Electronically Signed and Approved By: ISHAAN HOLGUIN MD on 3/17/2023 at 15:40                 The above data has been reviewed by GENESIS Dougherty 05/08/2023 13:30 EDT.          Patient Care Team:  Marquita Mata APRN as PCP - General (Nurse Practitioner)  Daniele Walker MD as Consulting Physician (Plastic Surgery)  Tova Childs APRN as Nurse Practitioner (Plastic Surgery)           ASSESSMENT & PLAN    There are no diagnoses linked to this encounter.     Tobacco Use: Low Risk    • Smoking Tobacco Use: Never   • Smokeless Tobacco Use: Never   • Passive Exposure: Not on file       Follow Up     No follow-ups on file.      Patient was given instructions and counseling regarding her condition or for health maintenance advice. Please see specific information pulled into the AVS if appropriate.   I have reviewed information obtained and documented by others and I have confirmed the accuracy of this documented note.    GENESIS Dougherty

## 2023-05-14 DIAGNOSIS — E11.9 TYPE 2 DIABETES MELLITUS WITHOUT COMPLICATION, WITHOUT LONG-TERM CURRENT USE OF INSULIN: ICD-10-CM

## 2023-05-15 RX ORDER — DULAGLUTIDE 1.5 MG/.5ML
INJECTION, SOLUTION SUBCUTANEOUS
Qty: 2 ML | Refills: 2 | Status: SHIPPED | OUTPATIENT
Start: 2023-05-15

## 2023-05-31 DIAGNOSIS — E11.9 TYPE 2 DIABETES MELLITUS WITHOUT COMPLICATION, WITHOUT LONG-TERM CURRENT USE OF INSULIN: ICD-10-CM

## 2023-05-31 DIAGNOSIS — I10 HYPERTENSION, UNSPECIFIED TYPE: ICD-10-CM

## 2023-05-31 RX ORDER — LISINOPRIL 5 MG/1
5 TABLET ORAL DAILY
Qty: 90 TABLET | Refills: 0 | Status: SHIPPED | OUTPATIENT
Start: 2023-05-31

## 2023-06-05 RX ORDER — FLUTICASONE PROPIONATE 50 MCG
SPRAY, SUSPENSION (ML) NASAL
Qty: 16 G | Refills: 1 | Status: SHIPPED | OUTPATIENT
Start: 2023-06-05

## 2023-06-08 DIAGNOSIS — E55.9 VITAMIN D DEFICIENCY: ICD-10-CM

## 2023-06-08 RX ORDER — ERGOCALCIFEROL 1.25 MG/1
CAPSULE ORAL
Qty: 13 CAPSULE | Refills: 1 | Status: SHIPPED | OUTPATIENT
Start: 2023-06-08

## 2023-07-25 RX ORDER — ATORVASTATIN CALCIUM 10 MG/1
10 TABLET, FILM COATED ORAL DAILY
Qty: 90 TABLET | Refills: 1 | Status: SHIPPED | OUTPATIENT
Start: 2023-07-25

## 2023-08-02 ENCOUNTER — OFFICE VISIT (OUTPATIENT)
Dept: FAMILY MEDICINE CLINIC | Facility: CLINIC | Age: 63
End: 2023-08-02
Payer: MEDICAID

## 2023-08-02 VITALS
WEIGHT: 191 LBS | HEART RATE: 74 BPM | SYSTOLIC BLOOD PRESSURE: 129 MMHG | OXYGEN SATURATION: 98 % | HEIGHT: 60 IN | DIASTOLIC BLOOD PRESSURE: 62 MMHG | BODY MASS INDEX: 37.5 KG/M2

## 2023-08-02 DIAGNOSIS — E11.9 TYPE 2 DIABETES MELLITUS WITHOUT COMPLICATION, WITHOUT LONG-TERM CURRENT USE OF INSULIN: ICD-10-CM

## 2023-08-02 DIAGNOSIS — I10 HYPERTENSION, UNSPECIFIED TYPE: ICD-10-CM

## 2023-08-02 DIAGNOSIS — Z13.29 THYROID DISORDER SCREEN: ICD-10-CM

## 2023-08-02 DIAGNOSIS — Z00.00 ANNUAL PHYSICAL EXAM: Primary | ICD-10-CM

## 2023-08-02 DIAGNOSIS — E66.01 CLASS 2 SEVERE OBESITY WITH SERIOUS COMORBIDITY AND BODY MASS INDEX (BMI) OF 37.0 TO 37.9 IN ADULT, UNSPECIFIED OBESITY TYPE: ICD-10-CM

## 2023-08-02 RX ORDER — DULAGLUTIDE 1.5 MG/.5ML
1.5 INJECTION, SOLUTION SUBCUTANEOUS WEEKLY
Qty: 2 ML | Refills: 2 | Status: SHIPPED | OUTPATIENT
Start: 2023-08-02

## 2023-08-02 RX ORDER — LISINOPRIL 5 MG/1
5 TABLET ORAL DAILY
Qty: 90 TABLET | Refills: 0 | Status: SHIPPED | OUTPATIENT
Start: 2023-08-02

## 2023-08-07 RX ORDER — FLUTICASONE PROPIONATE 50 MCG
SPRAY, SUSPENSION (ML) NASAL
Qty: 16 G | Refills: 2 | Status: SHIPPED | OUTPATIENT
Start: 2023-08-07

## 2023-08-08 ENCOUNTER — LAB (OUTPATIENT)
Dept: LAB | Facility: HOSPITAL | Age: 63
End: 2023-08-08
Payer: MEDICAID

## 2023-08-08 DIAGNOSIS — I10 HYPERTENSION, UNSPECIFIED TYPE: ICD-10-CM

## 2023-08-08 DIAGNOSIS — Z00.00 ANNUAL PHYSICAL EXAM: ICD-10-CM

## 2023-08-08 DIAGNOSIS — E11.9 TYPE 2 DIABETES MELLITUS WITHOUT COMPLICATION, WITHOUT LONG-TERM CURRENT USE OF INSULIN: ICD-10-CM

## 2023-08-08 DIAGNOSIS — Z13.29 THYROID DISORDER SCREEN: ICD-10-CM

## 2023-08-08 LAB
ALBUMIN SERPL-MCNC: 4.6 G/DL (ref 3.5–5.2)
ALBUMIN UR-MCNC: 2.7 MG/DL
ALBUMIN/GLOB SERPL: 2 G/DL
ALP SERPL-CCNC: 47 U/L (ref 39–117)
ALT SERPL W P-5'-P-CCNC: 26 U/L (ref 1–33)
ANION GAP SERPL CALCULATED.3IONS-SCNC: 13 MMOL/L (ref 5–15)
AST SERPL-CCNC: 23 U/L (ref 1–32)
BASOPHILS # BLD AUTO: 0.08 10*3/MM3 (ref 0–0.2)
BASOPHILS NFR BLD AUTO: 1.6 % (ref 0–1.5)
BILIRUB SERPL-MCNC: 1.2 MG/DL (ref 0–1.2)
BUN SERPL-MCNC: 10 MG/DL (ref 8–23)
BUN/CREAT SERPL: 14.9 (ref 7–25)
CALCIUM SPEC-SCNC: 9.8 MG/DL (ref 8.6–10.5)
CHLORIDE SERPL-SCNC: 103 MMOL/L (ref 98–107)
CHOLEST SERPL-MCNC: 144 MG/DL (ref 0–200)
CO2 SERPL-SCNC: 24 MMOL/L (ref 22–29)
CREAT SERPL-MCNC: 0.67 MG/DL (ref 0.57–1)
CREAT UR-MCNC: 286.7 MG/DL
DEPRECATED RDW RBC AUTO: 39.5 FL (ref 37–54)
EGFRCR SERPLBLD CKD-EPI 2021: 99 ML/MIN/1.73
EOSINOPHIL # BLD AUTO: 0.23 10*3/MM3 (ref 0–0.4)
EOSINOPHIL NFR BLD AUTO: 4.5 % (ref 0.3–6.2)
ERYTHROCYTE [DISTWIDTH] IN BLOOD BY AUTOMATED COUNT: 11.8 % (ref 12.3–15.4)
GLOBULIN UR ELPH-MCNC: 2.3 GM/DL
GLUCOSE SERPL-MCNC: 89 MG/DL (ref 65–99)
HBA1C MFR BLD: 5.2 % (ref 4.8–5.6)
HCT VFR BLD AUTO: 42 % (ref 34–46.6)
HDLC SERPL-MCNC: 64 MG/DL (ref 40–60)
HGB BLD-MCNC: 14.1 G/DL (ref 12–15.9)
IMM GRANULOCYTES # BLD AUTO: 0.01 10*3/MM3 (ref 0–0.05)
IMM GRANULOCYTES NFR BLD AUTO: 0.2 % (ref 0–0.5)
LDLC SERPL CALC-MCNC: 61 MG/DL (ref 0–100)
LDLC/HDLC SERPL: 0.93 {RATIO}
LYMPHOCYTES # BLD AUTO: 1.18 10*3/MM3 (ref 0.7–3.1)
LYMPHOCYTES NFR BLD AUTO: 22.9 % (ref 19.6–45.3)
MCH RBC QN AUTO: 30.5 PG (ref 26.6–33)
MCHC RBC AUTO-ENTMCNC: 33.6 G/DL (ref 31.5–35.7)
MCV RBC AUTO: 90.9 FL (ref 79–97)
MICROALBUMIN/CREAT UR: 9.4 MG/G
MONOCYTES # BLD AUTO: 0.45 10*3/MM3 (ref 0.1–0.9)
MONOCYTES NFR BLD AUTO: 8.7 % (ref 5–12)
NEUTROPHILS NFR BLD AUTO: 3.21 10*3/MM3 (ref 1.7–7)
NEUTROPHILS NFR BLD AUTO: 62.1 % (ref 42.7–76)
NRBC BLD AUTO-RTO: 0 /100 WBC (ref 0–0.2)
PLATELET # BLD AUTO: 280 10*3/MM3 (ref 140–450)
PMV BLD AUTO: 11.3 FL (ref 6–12)
POTASSIUM SERPL-SCNC: 3.8 MMOL/L (ref 3.5–5.2)
PROT SERPL-MCNC: 6.9 G/DL (ref 6–8.5)
RBC # BLD AUTO: 4.62 10*6/MM3 (ref 3.77–5.28)
SODIUM SERPL-SCNC: 140 MMOL/L (ref 136–145)
TRIGL SERPL-MCNC: 103 MG/DL (ref 0–150)
TSH SERPL DL<=0.05 MIU/L-ACNC: 2.2 UIU/ML (ref 0.27–4.2)
VLDLC SERPL-MCNC: 19 MG/DL (ref 5–40)
WBC NRBC COR # BLD: 5.16 10*3/MM3 (ref 3.4–10.8)

## 2023-08-08 PROCEDURE — 84443 ASSAY THYROID STIM HORMONE: CPT

## 2023-08-08 PROCEDURE — 80061 LIPID PANEL: CPT

## 2023-08-08 PROCEDURE — 82043 UR ALBUMIN QUANTITATIVE: CPT

## 2023-08-08 PROCEDURE — 83036 HEMOGLOBIN GLYCOSYLATED A1C: CPT

## 2023-08-08 PROCEDURE — 85025 COMPLETE CBC W/AUTO DIFF WBC: CPT

## 2023-08-08 PROCEDURE — 36415 COLL VENOUS BLD VENIPUNCTURE: CPT

## 2023-08-08 PROCEDURE — 82570 ASSAY OF URINE CREATININE: CPT

## 2023-08-08 PROCEDURE — 80053 COMPREHEN METABOLIC PANEL: CPT

## 2023-08-25 DIAGNOSIS — E11.9 TYPE 2 DIABETES MELLITUS WITHOUT COMPLICATION, WITHOUT LONG-TERM CURRENT USE OF INSULIN: ICD-10-CM

## 2023-10-04 ENCOUNTER — TELEPHONE (OUTPATIENT)
Dept: FAMILY MEDICINE CLINIC | Facility: CLINIC | Age: 63
End: 2023-10-04
Payer: MEDICAID

## 2023-10-04 NOTE — TELEPHONE ENCOUNTER
Caller: Cassandra Maria    Relationship: Self    Best call back number: [phone]    What medication are you requesting: PREDNISONE     What are your current symptoms: ITCHY RASH ON HANDS AND ARMS     How long have you been experiencing symptoms: SINCE YESTERDAY     Have you had these symptoms before:    [x] Yes  [] No    Have you been treated for these symptoms before:   [x] Yes  [] No    If a prescription is needed, what is your preferred pharmacy and phone number: [id number] - [place], [place] - [address] AT [facility] [phone] [facility] [phone]

## 2023-10-05 NOTE — TELEPHONE ENCOUNTER
Spoke with patient and patient stated she is okay and rash is actually clearing up and she has some predisone left.   Patient stated she is going out of town and will just schedule appt with Marce when she gets back.

## 2023-10-10 ENCOUNTER — TELEPHONE (OUTPATIENT)
Dept: PLASTIC SURGERY | Facility: CLINIC | Age: 63
End: 2023-10-10
Payer: MEDICAID

## 2023-10-10 DIAGNOSIS — Z01.818 PRE-OPERATIVE EXAMINATION: Primary | ICD-10-CM

## 2023-10-10 NOTE — H&P (VIEW-ONLY)
"Follow-up (PRE OP)            History of Present Illness  Cassandra Maria is a 63 y.o. female who presents to [facility] for Pre-Operative Examination for BREAST REDUCTION BILATERAL, Bilateral breast scar revision with excision of bilateral breast mass 11/7/23.    Cannot take gabapentin. Makes her nonfunctional.     Subjective        Gabapentin  Allergies Reconciled.    Review of Systems   All review of system has been reviewed and it  is negative except the ones note above.     Objective     /74 (BP Location: Right arm, Patient Position: Sitting, Cuff Size: Adult)   Pulse 78   Temp 97.8 °F (36.6 °C) (Temporal)   Ht 152.4 cm (60\")   Wt 86.3 kg (190 lb 3.2 oz)   SpO2 98%   BMI 37.15 kg/m²     Body mass index is 37.15 kg/m².    Physical Exam   Cardiovascular: Normal rate.     Pulmonary/Chest  Effort normal.       Result Review :          Assessment and Plan      Diagnoses and all orders for this visit:    1. Preop examination (Primary)  -     ondansetron (Zofran) 4 MG tablet; Take 1 tablet by mouth Every 6 (Six) Hours As Needed for Nausea or Vomiting.  Dispense: 20 tablet; Refill: 0  -     acetaminophen (TYLENOL) 500 MG tablet; Take 2 tablets by mouth Every 8 (Eight) Hours for 18 doses. Take 2 tablet by mouth 3 times a day starting 3 days prior to surgery then resume 3 days after surgery.  Dispense: 18 tablet; Refill: 1  -     cephalexin (KEFLEX) 500 MG capsule; Take 1 capsule by mouth 4 (Four) Times a Day for 5 days.  Dispense: 20 capsule; Refill: 0  -     naproxen (NAPROSYN) 250 MG tablet; Take 1 tablet by mouth 2 (Two) Times a Day. Take 1 tablet by mouth 2 times a day starting 3 days prior to surgery then resume 3 days after surgery.  Dispense: 12 tablet; Refill: 0    2. Fat necrosis    3. Hypertrophic scar        Plan:  Given these options, the patient has verbally expressed an understanding of the risks of surgery and finds these risks acceptable. " We will proceed with surgery as soon as possible.    Medications sent to pharmacy      Scribed by Rachell Xavier, acting as a scribe for Daniele Walker MD, 10/18/23 15:08 EDT.  Daniele Walker MD's signature on the note affirms that the note adequately documents the care provided.          Patient was given instructions and counseling regarding her condition. Please see specific information pulled into the AVS if appropriate.     Daniele Walker MD  10/18/2023

## 2023-10-10 NOTE — PROGRESS NOTES
"Follow-up (PRE OP)            History of Present Illness  Cassandra Maria is a 63 y.o. female who presents to [facility] for Pre-Operative Examination for BREAST REDUCTION BILATERAL, Bilateral breast scar revision with excision of bilateral breast mass 11/7/23.    Cannot take gabapentin. Makes her nonfunctional.     Subjective        Gabapentin  Allergies Reconciled.    Review of Systems   All review of system has been reviewed and it  is negative except the ones note above.     Objective     /74 (BP Location: Right arm, Patient Position: Sitting, Cuff Size: Adult)   Pulse 78   Temp 97.8 °F (36.6 °C) (Temporal)   Ht 152.4 cm (60\")   Wt 86.3 kg (190 lb 3.2 oz)   SpO2 98%   BMI 37.15 kg/m²     Body mass index is 37.15 kg/m².    Physical Exam   Cardiovascular: Normal rate.     Pulmonary/Chest  Effort normal.       Result Review :          Assessment and Plan      Diagnoses and all orders for this visit:    1. Preop examination (Primary)  -     ondansetron (Zofran) 4 MG tablet; Take 1 tablet by mouth Every 6 (Six) Hours As Needed for Nausea or Vomiting.  Dispense: 20 tablet; Refill: 0  -     acetaminophen (TYLENOL) 500 MG tablet; Take 2 tablets by mouth Every 8 (Eight) Hours for 18 doses. Take 2 tablet by mouth 3 times a day starting 3 days prior to surgery then resume 3 days after surgery.  Dispense: 18 tablet; Refill: 1  -     cephalexin (KEFLEX) 500 MG capsule; Take 1 capsule by mouth 4 (Four) Times a Day for 5 days.  Dispense: 20 capsule; Refill: 0  -     naproxen (NAPROSYN) 250 MG tablet; Take 1 tablet by mouth 2 (Two) Times a Day. Take 1 tablet by mouth 2 times a day starting 3 days prior to surgery then resume 3 days after surgery.  Dispense: 12 tablet; Refill: 0    2. Fat necrosis    3. Hypertrophic scar        Plan:  Given these options, the patient has verbally expressed an understanding of the risks of surgery and finds these risks acceptable. " We will proceed with surgery as soon as possible.    Medications sent to pharmacy      Scribed by Rachell Xavier, acting as a scribe for Daniele Walker MD, 10/18/23 15:08 EDT.  Daniele Walker MD's signature on the note affirms that the note adequately documents the care provided.          Patient was given instructions and counseling regarding her condition. Please see specific information pulled into the AVS if appropriate.     Daniele Walker MD  10/18/2023

## 2023-10-10 NOTE — TELEPHONE ENCOUNTER
Called patient and made her aware to take her nicotine test prior to her appointment on the 18th. Patient is aware and will be taking this test on Monday.

## 2023-10-13 ENCOUNTER — LAB (OUTPATIENT)
Dept: LAB | Facility: HOSPITAL | Age: 63
End: 2023-10-13
Payer: MEDICAID

## 2023-10-13 DIAGNOSIS — Z01.818 PRE-OPERATIVE EXAMINATION: ICD-10-CM

## 2023-10-13 LAB — COTININE UR-MCNC: NEGATIVE NG/ML

## 2023-10-13 PROCEDURE — G0480 DRUG TEST DEF 1-7 CLASSES: HCPCS

## 2023-10-18 ENCOUNTER — OFFICE VISIT (OUTPATIENT)
Dept: PLASTIC SURGERY | Facility: CLINIC | Age: 63
End: 2023-10-18
Payer: MEDICAID

## 2023-10-18 VITALS
BODY MASS INDEX: 37.34 KG/M2 | HEIGHT: 60 IN | OXYGEN SATURATION: 98 % | SYSTOLIC BLOOD PRESSURE: 149 MMHG | TEMPERATURE: 97.8 F | WEIGHT: 190.2 LBS | DIASTOLIC BLOOD PRESSURE: 74 MMHG | HEART RATE: 78 BPM

## 2023-10-18 DIAGNOSIS — Z01.818 PREOP EXAMINATION: Primary | ICD-10-CM

## 2023-10-18 DIAGNOSIS — M79.89 FAT NECROSIS: ICD-10-CM

## 2023-10-18 DIAGNOSIS — L91.0 HYPERTROPHIC SCAR: ICD-10-CM

## 2023-10-18 PROCEDURE — 3077F SYST BP >= 140 MM HG: CPT | Performed by: SURGERY

## 2023-10-18 PROCEDURE — 99212 OFFICE O/P EST SF 10 MIN: CPT | Performed by: SURGERY

## 2023-10-18 PROCEDURE — 1159F MED LIST DOCD IN RCRD: CPT | Performed by: SURGERY

## 2023-10-18 PROCEDURE — 3078F DIAST BP <80 MM HG: CPT | Performed by: SURGERY

## 2023-10-18 PROCEDURE — 1160F RVW MEDS BY RX/DR IN RCRD: CPT | Performed by: SURGERY

## 2023-10-24 RX ORDER — NAPROXEN 250 MG/1
250 TABLET ORAL 2 TIMES DAILY
Qty: 12 TABLET | Refills: 0 | Status: SHIPPED | OUTPATIENT
Start: 2023-10-24

## 2023-10-24 RX ORDER — CEPHALEXIN 500 MG/1
500 CAPSULE ORAL 4 TIMES DAILY
Qty: 20 CAPSULE | Refills: 0 | Status: SHIPPED | OUTPATIENT
Start: 2023-10-24 | End: 2023-10-29

## 2023-10-24 RX ORDER — ONDANSETRON 4 MG/1
4 TABLET, FILM COATED ORAL EVERY 6 HOURS PRN
Qty: 20 TABLET | Refills: 0 | Status: SHIPPED | OUTPATIENT
Start: 2023-10-24 | End: 2024-10-23

## 2023-10-24 RX ORDER — ACETAMINOPHEN 500 MG
1000 TABLET ORAL EVERY 8 HOURS
Qty: 18 TABLET | Refills: 1 | Status: SHIPPED | OUTPATIENT
Start: 2023-10-24 | End: 2023-10-30

## 2023-10-31 ENCOUNTER — PRE-ADMISSION TESTING (OUTPATIENT)
Dept: PREADMISSION TESTING | Facility: HOSPITAL | Age: 63
End: 2023-10-31
Payer: MEDICAID

## 2023-10-31 VITALS
WEIGHT: 191.58 LBS | HEIGHT: 60 IN | DIASTOLIC BLOOD PRESSURE: 74 MMHG | SYSTOLIC BLOOD PRESSURE: 126 MMHG | TEMPERATURE: 97.5 F | BODY MASS INDEX: 37.61 KG/M2 | HEART RATE: 83 BPM | RESPIRATION RATE: 16 BRPM

## 2023-10-31 DIAGNOSIS — Z01.818 PREOP TESTING: Primary | ICD-10-CM

## 2023-10-31 LAB
ANION GAP SERPL CALCULATED.3IONS-SCNC: 9.2 MMOL/L (ref 5–15)
BUN SERPL-MCNC: 9 MG/DL (ref 8–23)
BUN/CREAT SERPL: 13.8 (ref 7–25)
CALCIUM SPEC-SCNC: 9.7 MG/DL (ref 8.6–10.5)
CHLORIDE SERPL-SCNC: 105 MMOL/L (ref 98–107)
CO2 SERPL-SCNC: 24.8 MMOL/L (ref 22–29)
CREAT SERPL-MCNC: 0.65 MG/DL (ref 0.57–1)
EGFRCR SERPLBLD CKD-EPI 2021: 99.1 ML/MIN/1.73
GLUCOSE SERPL-MCNC: 89 MG/DL (ref 65–99)
POTASSIUM SERPL-SCNC: 4.3 MMOL/L (ref 3.5–5.2)
QT INTERVAL: 372 MS
QTC INTERVAL: 413 MS
SODIUM SERPL-SCNC: 139 MMOL/L (ref 136–145)

## 2023-10-31 PROCEDURE — 80048 BASIC METABOLIC PNL TOTAL CA: CPT

## 2023-10-31 PROCEDURE — 36415 COLL VENOUS BLD VENIPUNCTURE: CPT

## 2023-10-31 PROCEDURE — 93005 ELECTROCARDIOGRAM TRACING: CPT

## 2023-10-31 RX ORDER — ACETAMINOPHEN 500 MG
500 TABLET ORAL EVERY 6 HOURS PRN
COMMUNITY

## 2023-10-31 RX ORDER — CEPHALEXIN 500 MG/1
500 CAPSULE ORAL 4 TIMES DAILY
COMMUNITY

## 2023-10-31 RX ORDER — MULTIPLE VITAMINS W/ MINERALS TAB 9MG-400MCG
1 TAB ORAL DAILY
COMMUNITY

## 2023-10-31 NOTE — DISCHARGE INSTRUCTIONS
IMPORTANT INSTRUCTIONS - PRE-ADMISSION TESTING  DO NOT EAT OR CHEW anything after midnight the night before your procedure.    You may have CLEAR liquids up to __2____ hours prior to ARRIVAL time.   Take the following medications the morning of your procedure with JUST A SIP OF WATER:       ZOFRAN IF NEEDED, FLONASE, LIPITOR, TYLENOL     DO NOT BRING your medications to the hospital with you, UNLESS something has changed since your PRE-Admission Testing appointment.  Hold all vitamins, supplements, and NSAIDS (Non- steroidal anti-inflammatory meds) for one week prior to surgery (you MAY take Tylenol or Acetaminophen).           STOP 10-31-23 BIOTIN, MULTIVITAMIN, VOLTAREN PILL AND GEL     If you are diabetic, check your blood sugar the morning of your procedure. If it is less than 70 or if you are feeling symptomatic, call the following number for further instructions: [phone]_______.  Use your inhalers/nebulizers as usual, the morning of your procedure. BRING YOUR INHALERS with you.   Bring your CPAP or BIPAP to hospital, ONLY IF YOU WILL BE SPENDING THE NIGHT.   Make sure you have a ride home and have someone who will stay with you the day of your procedure after you go home.  If you have any questions, please call your Pre-Admission Testing Nurse, __SHADE WASHBURN_ at [phone]_________.   Per anesthesia request, do not smoke for 24 hours before your procedure or as instructed by your surgeon.      SURGERY 11-7-23 [facility] FIRST FLOOR  WILL YOU CALL ARRIVAL TIME THE DAY PRIOR TO SURGERY AFTER 1PM   USE SURGICAL SOAP AS INSTRUCTED BELOW  FOLLOW ANY INSTRUCTIONS FROM SURGEON'S OFFICE (FOLLOW HER INSTRUCTIONS ON THE TYLENOL AND NAPROXEN 3 DAYS PRIOR TO SURGERY)  TAKE METFORMIN BY 6PM THE NIGHT PRIOR TO SURGERY AND DO NOT TAKE DAY OF SURGERY       PREOPERATIVE (BEFORE SURGERY)              BATHING INSTRUCTIONS  Instructions:    You will need to shower 1 separate times utilizing the soap provided;  at the times indicated   below:     11-6-23 MONDAY PM           OR   11-7-23 TUES AM     Wash your hair and face with normal shampoo and soap, rinse it well before using the surgical soap.      In the shower, wet the skin completely with water from your neck to your feet. Apply the cleanser to your   body ONLY FROM THE NECK TO YOUR FEET.     Do NOT USE THE CLEANSER ON YOUR FACE, HEAD, OR GENITAL (PRIVATE) AREAS.   Keep it out of your eyes, ears, and mouth because of the risk of injury to those areas.      Scrub with a clean washcloth for each bath utilizing the soap provided from the top of your body to the   bottom starting at the neck area.      Pay close attention to your armpits, groin area, and the site of surgery.      Wash your body gently for 5 minutes. Stand outside the stream or turn off the water while scrubbing your   body. Do NOT wash with your regular soap after the surgical cleanser is used.      RINSE THE CLEANSER OFF COMPLETELY with plenty of water. Rinse the area again thoroughly.      Dry off with a clean towel. The surgical soap can cause dryness; however do NOT APPLY LOTION,   CREAM, POWDER, and/or DEODORANT AFTER SHOWERING.     Be sure to where clean clothes after showering.      Ensure CLEAN BED LINENS AFTER FIRST wash with the surgical soap. - ON MONDAY PM     NO PETS ALLOWED IN THE BED with you after utilizing the surgical soap.    Clear Liquid Diet        Find out when you need to start a clear liquid diet.   Think of “clear liquids” as anything you could read a newspaper through. This includes things like water, broth, sports drinks, or tea WITHOUT any kind of milk or cream.           Once you are told to start a clear liquid diet, only drink these things until 2 hours before arrival to the hospital or when the hospital says to stop. Total volume limitation: 8 oz.       Clear liquids you CAN drink:   Water   Clear broth: beef, chicken, vegetable, or bone broth with nothing in it   Gatorade    Lemonade or Jagdish-aid   Soda   Tea, coffee (NO cream or honey)   Jell-O (without fruit)   Popsicles (without fruit or cream)   Italian ices   Juice without pulp: apple, white, grape   You may use salt, pepper, and sugar  NO NOODLES  NOTHING RED    Do NOT drink:   Milk or cream   Soy milk, almond milk, coconut milk, or other non-dairy drinks and   creamers   Milkshakes or smoothies   Tomato juice   Orange juice   Grapefruit juice   Cream soups or any other than broth         Clear Liquid Diet:  Do NOT eat any solid food.  Do NOT eat or suck on mints or candy.  Do NOT chew gum.  Do NOT drink thick liquids like milk or juice with pulp in it.  Do NOT add milk, cream, or anything like soy milk or almond milk to coffee or tea.     PATIENT VERBALIZED UNDERSTANDING

## 2023-11-06 RX ORDER — FLUTICASONE PROPIONATE 50 MCG
2 SPRAY, SUSPENSION (ML) NASAL DAILY
Qty: 16 G | Refills: 2 | Status: SHIPPED | OUTPATIENT
Start: 2023-11-06

## 2023-11-07 ENCOUNTER — ANESTHESIA EVENT (OUTPATIENT)
Dept: PERIOP | Facility: HOSPITAL | Age: 63
End: 2023-11-07
Payer: MEDICAID

## 2023-11-07 ENCOUNTER — ANESTHESIA (OUTPATIENT)
Dept: PERIOP | Facility: HOSPITAL | Age: 63
End: 2023-11-07
Payer: MEDICAID

## 2023-11-07 ENCOUNTER — HOSPITAL ENCOUNTER (OUTPATIENT)
Facility: HOSPITAL | Age: 63
Setting detail: HOSPITAL OUTPATIENT SURGERY
Discharge: HOME | End: 2023-11-07
Attending: SURGERY | Admitting: SURGERY
Payer: MEDICAID

## 2023-11-07 VITALS
HEART RATE: 67 BPM | BODY MASS INDEX: 37.66 KG/M2 | WEIGHT: 191.8 LBS | DIASTOLIC BLOOD PRESSURE: 78 MMHG | TEMPERATURE: 97.5 F | RESPIRATION RATE: 16 BRPM | OXYGEN SATURATION: 95 % | HEIGHT: 60 IN | SYSTOLIC BLOOD PRESSURE: 110 MMHG

## 2023-11-07 LAB — GLUCOSE BLDC GLUCOMTR-MCNC: 86 MG/DL (ref 70–99)

## 2023-11-07 PROCEDURE — 25010000002 DEXAMETHASONE PER 1 MG: Performed by: SURGERY

## 2023-11-07 PROCEDURE — 25010000002 PROPOFOL 10 MG/ML EMULSION: Performed by: NURSE ANESTHETIST, CERTIFIED REGISTERED

## 2023-11-07 PROCEDURE — 25810000003 LACTATED RINGERS PER 1000 ML: Performed by: ANESTHESIOLOGY

## 2023-11-07 PROCEDURE — 25010000002 CEFAZOLIN IN DEXTROSE 2000 MG/ 100 ML SOLUTION: Performed by: SURGERY

## 2023-11-07 PROCEDURE — 25010000002 SUGAMMADEX 200 MG/2ML SOLUTION: Performed by: NURSE ANESTHETIST, CERTIFIED REGISTERED

## 2023-11-07 PROCEDURE — 25010000002 ONDANSETRON PER 1 MG: Performed by: NURSE ANESTHETIST, CERTIFIED REGISTERED

## 2023-11-07 PROCEDURE — 25010000002 MIDAZOLAM PER 1MG: Performed by: ANESTHESIOLOGY

## 2023-11-07 PROCEDURE — 82948 REAGENT STRIP/BLOOD GLUCOSE: CPT

## 2023-11-07 PROCEDURE — 25010000002 BUPIVACAINE (PF) 0.5 % SOLUTION: Performed by: SURGERY

## 2023-11-07 PROCEDURE — 25010000002 DEXAMETHASONE PER 1 MG: Performed by: NURSE ANESTHETIST, CERTIFIED REGISTERED

## 2023-11-07 PROCEDURE — 25010000002 KETOROLAC TROMETHAMINE PER 15 MG: Performed by: NURSE ANESTHETIST, CERTIFIED REGISTERED

## 2023-11-07 PROCEDURE — 25010000002 FENTANYL CITRATE (PF) 50 MCG/ML SOLUTION: Performed by: NURSE ANESTHETIST, CERTIFIED REGISTERED

## 2023-11-07 RX ORDER — KETOROLAC TROMETHAMINE 30 MG/ML
INJECTION, SOLUTION INTRAMUSCULAR; INTRAVENOUS AS NEEDED
Status: DISCONTINUED | OUTPATIENT
Start: 2023-11-07 | End: 2023-11-07 | Stop reason: SURG

## 2023-11-07 RX ORDER — ROCURONIUM BROMIDE 10 MG/ML
INJECTION, SOLUTION INTRAVENOUS AS NEEDED
Status: DISCONTINUED | OUTPATIENT
Start: 2023-11-07 | End: 2023-11-07 | Stop reason: SURG

## 2023-11-07 RX ORDER — MEPERIDINE HYDROCHLORIDE 25 MG/ML
12.5 INJECTION INTRAMUSCULAR; INTRAVENOUS; SUBCUTANEOUS
Status: DISCONTINUED | OUTPATIENT
Start: 2023-11-07 | End: 2023-11-07 | Stop reason: HOSPADM

## 2023-11-07 RX ORDER — PROPOFOL 10 MG/ML
VIAL (ML) INTRAVENOUS AS NEEDED
Status: DISCONTINUED | OUTPATIENT
Start: 2023-11-07 | End: 2023-11-07 | Stop reason: SURG

## 2023-11-07 RX ORDER — SODIUM CHLORIDE, SODIUM LACTATE, POTASSIUM CHLORIDE, CALCIUM CHLORIDE 600; 310; 30; 20 MG/100ML; MG/100ML; MG/100ML; MG/100ML
9 INJECTION, SOLUTION INTRAVENOUS CONTINUOUS PRN
Status: DISCONTINUED | OUTPATIENT
Start: 2023-11-07 | End: 2023-11-07 | Stop reason: HOSPADM

## 2023-11-07 RX ORDER — ACETAMINOPHEN 500 MG
1000 TABLET ORAL ONCE
Status: COMPLETED | OUTPATIENT
Start: 2023-11-07 | End: 2023-11-07

## 2023-11-07 RX ORDER — PROMETHAZINE HYDROCHLORIDE 12.5 MG/1
25 TABLET ORAL ONCE AS NEEDED
Status: DISCONTINUED | OUTPATIENT
Start: 2023-11-07 | End: 2023-11-07 | Stop reason: HOSPADM

## 2023-11-07 RX ORDER — LIDOCAINE HYDROCHLORIDE 20 MG/ML
INJECTION, SOLUTION EPIDURAL; INFILTRATION; INTRACAUDAL; PERINEURAL AS NEEDED
Status: DISCONTINUED | OUTPATIENT
Start: 2023-11-07 | End: 2023-11-07 | Stop reason: SURG

## 2023-11-07 RX ORDER — FENTANYL CITRATE 50 UG/ML
INJECTION, SOLUTION INTRAMUSCULAR; INTRAVENOUS AS NEEDED
Status: DISCONTINUED | OUTPATIENT
Start: 2023-11-07 | End: 2023-11-07 | Stop reason: SURG

## 2023-11-07 RX ORDER — METOCLOPRAMIDE HYDROCHLORIDE 5 MG/ML
10 INJECTION INTRAMUSCULAR; INTRAVENOUS ONCE AS NEEDED
Status: DISCONTINUED | OUTPATIENT
Start: 2023-11-07 | End: 2023-11-07 | Stop reason: HOSPADM

## 2023-11-07 RX ORDER — PROMETHAZINE HYDROCHLORIDE 25 MG/1
25 SUPPOSITORY RECTAL ONCE AS NEEDED
Status: DISCONTINUED | OUTPATIENT
Start: 2023-11-07 | End: 2023-11-07 | Stop reason: HOSPADM

## 2023-11-07 RX ORDER — DEXAMETHASONE SODIUM PHOSPHATE 4 MG/ML
INJECTION, SOLUTION INTRA-ARTICULAR; INTRALESIONAL; INTRAMUSCULAR; INTRAVENOUS; SOFT TISSUE AS NEEDED
Status: DISCONTINUED | OUTPATIENT
Start: 2023-11-07 | End: 2023-11-07 | Stop reason: SURG

## 2023-11-07 RX ORDER — DEXAMETHASONE SODIUM PHOSPHATE 4 MG/ML
INJECTION, SOLUTION INTRA-ARTICULAR; INTRALESIONAL; INTRAMUSCULAR; INTRAVENOUS; SOFT TISSUE AS NEEDED
Status: DISCONTINUED | OUTPATIENT
Start: 2023-11-07 | End: 2023-11-07 | Stop reason: HOSPADM

## 2023-11-07 RX ORDER — ONDANSETRON 2 MG/ML
INJECTION INTRAMUSCULAR; INTRAVENOUS AS NEEDED
Status: DISCONTINUED | OUTPATIENT
Start: 2023-11-07 | End: 2023-11-07 | Stop reason: SURG

## 2023-11-07 RX ORDER — ONDANSETRON 2 MG/ML
4 INJECTION INTRAMUSCULAR; INTRAVENOUS ONCE AS NEEDED
Status: DISCONTINUED | OUTPATIENT
Start: 2023-11-07 | End: 2023-11-07 | Stop reason: HOSPADM

## 2023-11-07 RX ORDER — DEXMEDETOMIDINE HYDROCHLORIDE 100 UG/ML
INJECTION, SOLUTION INTRAVENOUS AS NEEDED
Status: DISCONTINUED | OUTPATIENT
Start: 2023-11-07 | End: 2023-11-07 | Stop reason: SURG

## 2023-11-07 RX ORDER — FAMOTIDINE 20 MG/1
20 TABLET, FILM COATED ORAL
Status: COMPLETED | OUTPATIENT
Start: 2023-11-07 | End: 2023-11-07

## 2023-11-07 RX ORDER — MIDAZOLAM HYDROCHLORIDE 2 MG/2ML
2 INJECTION, SOLUTION INTRAMUSCULAR; INTRAVENOUS ONCE
Status: COMPLETED | OUTPATIENT
Start: 2023-11-07 | End: 2023-11-07

## 2023-11-07 RX ORDER — OXYCODONE HYDROCHLORIDE 5 MG/1
5 TABLET ORAL
Status: DISCONTINUED | OUTPATIENT
Start: 2023-11-07 | End: 2023-11-07 | Stop reason: HOSPADM

## 2023-11-07 RX ORDER — CEFAZOLIN SODIUM 2 G/100ML
2000 INJECTION, SOLUTION INTRAVENOUS
Status: COMPLETED | OUTPATIENT
Start: 2023-11-08 | End: 2023-11-07

## 2023-11-07 RX ORDER — BUPIVACAINE HYDROCHLORIDE 5 MG/ML
INJECTION, SOLUTION EPIDURAL; INTRACAUDAL AS NEEDED
Status: DISCONTINUED | OUTPATIENT
Start: 2023-11-07 | End: 2023-11-07 | Stop reason: HOSPADM

## 2023-11-07 RX ADMIN — ONDANSETRON 4 MG: 2 INJECTION INTRAMUSCULAR; INTRAVENOUS at 10:37

## 2023-11-07 RX ADMIN — FENTANYL CITRATE 50 MCG: 50 INJECTION, SOLUTION INTRAMUSCULAR; INTRAVENOUS at 07:26

## 2023-11-07 RX ADMIN — FENTANYL CITRATE 50 MCG: 50 INJECTION, SOLUTION INTRAMUSCULAR; INTRAVENOUS at 09:21

## 2023-11-07 RX ADMIN — ACETAMINOPHEN 1000 MG: 500 TABLET ORAL at 06:55

## 2023-11-07 RX ADMIN — MIDAZOLAM HYDROCHLORIDE 2 MG: 1 INJECTION, SOLUTION INTRAMUSCULAR; INTRAVENOUS at 07:13

## 2023-11-07 RX ADMIN — SUGAMMADEX 200 MG: 100 INJECTION, SOLUTION INTRAVENOUS at 09:20

## 2023-11-07 RX ADMIN — DEXMEDETOMIDINE 12 MCG: 100 INJECTION, SOLUTION INTRAVENOUS at 08:07

## 2023-11-07 RX ADMIN — SODIUM CHLORIDE, POTASSIUM CHLORIDE, SODIUM LACTATE AND CALCIUM CHLORIDE: 600; 310; 30; 20 INJECTION, SOLUTION INTRAVENOUS at 09:31

## 2023-11-07 RX ADMIN — FAMOTIDINE 20 MG: 20 TABLET, FILM COATED ORAL at 07:08

## 2023-11-07 RX ADMIN — DEXMEDETOMIDINE 8 MCG: 100 INJECTION, SOLUTION INTRAVENOUS at 07:55

## 2023-11-07 RX ADMIN — SODIUM CHLORIDE, POTASSIUM CHLORIDE, SODIUM LACTATE AND CALCIUM CHLORIDE 9 ML/HR: 600; 310; 30; 20 INJECTION, SOLUTION INTRAVENOUS at 06:55

## 2023-11-07 RX ADMIN — LIDOCAINE HYDROCHLORIDE 80 MG: 20 INJECTION, SOLUTION EPIDURAL; INFILTRATION; INTRACAUDAL; PERINEURAL at 07:26

## 2023-11-07 RX ADMIN — DEXAMETHASONE SODIUM PHOSPHATE 4 MG: 4 INJECTION, SOLUTION INTRAMUSCULAR; INTRAVENOUS at 07:34

## 2023-11-07 RX ADMIN — ROCURONIUM BROMIDE 40 MG: 50 INJECTION INTRAVENOUS at 07:28

## 2023-11-07 RX ADMIN — CEFAZOLIN SODIUM 2000 MG: 2 INJECTION, SOLUTION INTRAVENOUS at 07:25

## 2023-11-07 RX ADMIN — OXYCODONE HYDROCHLORIDE 5 MG: 5 TABLET ORAL at 10:37

## 2023-11-07 RX ADMIN — PROPOFOL 160 MG: 10 INJECTION, EMULSION INTRAVENOUS at 07:28

## 2023-11-07 RX ADMIN — KETOROLAC TROMETHAMINE 15 MG: 30 INJECTION, SOLUTION INTRAMUSCULAR; INTRAVENOUS at 08:57

## 2023-11-07 RX ADMIN — ONDANSETRON 4 MG: 2 INJECTION INTRAMUSCULAR; INTRAVENOUS at 07:45

## 2023-11-07 NOTE — ANESTHESIA POSTPROCEDURE EVALUATION
Patient: Cassandra Maria    Procedure Summary       Date: 11/07/23 Room / Location: [facility] OR  / [facility]    Anesthesia Start: 0720 Anesthesia Stop: 0936    Procedure: Bilateral breast scar revision. (Bilateral: Chest) Diagnosis:       Hypertrophic scar      Fat necrosis      (Hypertrophic scar [L91.0])      (Fat necrosis [M79.89])    Surgeons: Daniele Walker MD Provider: Darron Rivera MD    Anesthesia Type: general ASA Status: 2            Anesthesia Type: general    Vitals  Vitals Value Taken Time   /63 11/07/23 0950   Temp 36.2 °C (97.2 °F) 11/07/23 0935   Pulse 69 11/07/23 1000   Resp 15 11/07/23 0950   SpO2 95 % 11/07/23 1000   Vitals shown include unfiled device data.        Post Anesthesia Care and Evaluation    Patient location during evaluation: bedside  Patient participation: complete - patient participated  Level of consciousness: awake  Pain score: 2  Pain management: adequate    Airway patency: patent  PONV Status: none  Cardiovascular status: acceptable and stable  Respiratory status: acceptable  Hydration status: acceptable    Comments: An Anesthesiologist personally participated in the most demanding procedures (including induction and emergence if applicable) in the anesthesia plan, monitored the course of anesthesia administration at frequent intervals and remained physically present and available for immediate diagnosis and treatment of emergencies.

## 2023-11-07 NOTE — DISCHARGE INSTRUCTIONS
Ok for regular diet  Do not drive for next 2 weeks  Ok to shower in 3 days   Do not exercise for the next 6 weeks.  Sleep in an upright position  No bra for 1 week. After that, wear a comfortable soft bra  Ok to move arms slowly to the shoulder level (brushing hair movement)  Do not fly for 2 months    Take post-operative medications as directed on the bottle.     If you experience any issues or concerns, please contact the office at [phone]. If after hours a call service will notify the on-call provider.

## 2023-11-07 NOTE — ANESTHESIA PREPROCEDURE EVALUATION
Anesthesia Evaluation     Patient summary reviewed and Nursing notes reviewed   no history of anesthetic complications:   NPO Solid Status: > 8 hours  NPO Liquid Status: > 2 hours           Airway   Mallampati: III  TM distance: >3 FB  Neck ROM: full  No difficulty expected  Dental      Pulmonary - negative pulmonary ROS and normal exam    breath sounds clear to auscultation  Cardiovascular - normal exam  Exercise tolerance: good (4-7 METS)    Rhythm: regular  Rate: normal    (+) hypertension, hyperlipidemia      Neuro/Psych  (+) headaches  GI/Hepatic/Renal/Endo    (+) diabetes mellitus type 2    Musculoskeletal (-) negative ROS    Abdominal    Substance History - negative use     OB/GYN negative ob/gyn ROS         Other - negative ROS       ROS/Med Hx Other: Pt on trulicity, last dose 8 days ago                Anesthesia Plan    ASA 2     general     (Patient understands anesthesia not responsible for dental damage.)  intravenous induction     Anesthetic plan, risks, benefits, and alternatives have been provided, discussed and informed consent has been obtained with: patient.    Use of blood products discussed with patient .    Plan discussed with CRNA.      CODE STATUS:

## 2023-11-07 NOTE — INTERVAL H&P NOTE
H&P reviewed. The patient was examined and there are no changes to the H&P.    Patient is not tachycardic  Respirations are non elaborated  Vitals:    11/07/23 0621   BP: 124/75   Pulse: 68   Resp: 20   Temp: 97.7 °F (36.5 °C)   SpO2: 99%     Risks and benefits reminded to patient who agrees to proceed

## 2023-11-07 NOTE — OP NOTE
Plastic Surgery Op Note    BILATERAL BREAST REDUCTION SCAR REVISION    Cassandra Maria  11/7/2023    Pre-op Diagnosis:   Hypertrophic scar [L91.0]  Fat necrosis [M79.89]    Post-Op Diagnosis Codes:     * Hypertrophic scar [L91.0]     * Fat necrosis [M79.89]    Anesthesia: General    Staff:   Circulator: Clemencia Pickering RN  Scrub Person: Alee Willson  Assistant: Monserrat Seth RN CSA  Other: Martha Norman RN    Surgeon: Dr Walker    First Assistant: MATILDA Menjivar who was instrumental in helping with hemostasis, visualization and retraction structures as well as surgical closure.  Her skilled assistance was necessary for the success of this case.      Estimated Blood Loss: 100 mL    Specimens: * No orders in the log *      Drains: * No LDAs found *    Findings:   No fat necrosis or breast nodules were encountered, just normal expected scar from previous surgery  Lateral chest wall excision:  Total size: 76x 25 cm  Weight: 914g    Complications: none     After risks and benefits were discussed with patient and informed consent was signed, patient was taken to the OR and positioned supine. She was then anesthetized and placed prone. The surgical site was then prepped in a sterile fashion way and a time out was performed confirming patient's name and procedure to be performed. All surgical team was introduced by name.   The lateral chest wall excisions were marked in pre op. I then excised the posterior chest bilaterally on the markings, performed hemostasis and then closed in 3 layers with vicryl followed by insorb and then 3-0 vicryl. Patient was flipped and the procedure was repeated on the anterior lateral areas of the chest. A sterile dressing was applied.    Patient tolerated procedure well, there were no complications, all instruments were checked and patient was awakened and taken to PACU in stable condition.  I was present for the entire procedure.     Date: 11/7/2023  Time: 09:31 EST            Daniele Walker MD  11/07/23  09:31 EST

## 2023-11-20 NOTE — PROGRESS NOTES
"Chief Complaint  Post-op Follow-up (3 week post op)    Subjective          History of Present Illness  Cassandra Maria is a 63 y.o. female who presents to [facility] for Postoperative Follow-Up of Bilateral breast scar revision 11/7/23.    Pt presents today for 3 week post op. Tenderness and discomfort on sides. Steri strips still intact. Has some spitting sutures underneath right breast that need trimmed.    Allergies: Gabapentin  Allergies Reconciled.    Review of Systems   All review of system has been reviewed and it  is negative except the ones note above.     Objective     /89 (BP Location: Left arm, Patient Position: Sitting, Cuff Size: Adult)   Pulse 72   Temp 97.7 °F (36.5 °C) (Temporal)   Ht 152.4 cm (60\")   Wt 85.4 kg (188 lb 3.2 oz)   SpO2 97%   BMI 36.76 kg/m²     Body mass index is 36.76 kg/m².    Physical Exam   Cardiovascular: Normal rate.     Pulmonary/Chest  Effort normal.     General Inspection: Incision C/D/I, breast in good shape.    Result Review :                Assessment and Plan      Diagnoses and all orders for this visit:    1. Postoperative follow-up (Primary)    2. Hypertrophic scar        Plan:  Removed spitting sutures, advised pt to apply vitamin E to scars. Pt is able to wear bra and can lay down when sleeps.    Scribed by Rachell Xavier, acting as a scribe for Daniele Walker MD, 11/29/23 08:14 EST.  Daniele Walker MD's signature on the note affirms that the note adequately documents the care provided.          Follow Up     Return RTC in 3 months.    Patient was given instructions and counseling regarding her condition. Please see specific information pulled into the AVS if appropriate.     Daniele Walker MD  11/29/2023   "

## 2023-11-21 ENCOUNTER — OFFICE VISIT (OUTPATIENT)
Dept: PLASTIC SURGERY | Facility: CLINIC | Age: 63
End: 2023-11-21
Payer: MEDICAID

## 2023-11-21 VITALS
WEIGHT: 188 LBS | BODY MASS INDEX: 36.91 KG/M2 | TEMPERATURE: 97.7 F | HEART RATE: 74 BPM | HEIGHT: 60 IN | DIASTOLIC BLOOD PRESSURE: 81 MMHG | OXYGEN SATURATION: 98 % | SYSTOLIC BLOOD PRESSURE: 125 MMHG

## 2023-11-21 DIAGNOSIS — L91.0 HYPERTROPHIC SCAR: ICD-10-CM

## 2023-11-21 DIAGNOSIS — T14.8XXA HEMATOMA: Primary | ICD-10-CM

## 2023-11-21 DIAGNOSIS — Z09 POSTOPERATIVE FOLLOW-UP: ICD-10-CM

## 2023-11-21 PROCEDURE — 99024 POSTOP FOLLOW-UP VISIT: CPT | Performed by: NURSE PRACTITIONER

## 2023-11-21 RX ORDER — AMOXICILLIN AND CLAVULANATE POTASSIUM 875; 125 MG/1; MG/1
1 TABLET, FILM COATED ORAL 2 TIMES DAILY
Qty: 20 TABLET | Refills: 0 | Status: SHIPPED | OUTPATIENT
Start: 2023-11-21 | End: 2023-12-01

## 2023-11-21 NOTE — PROGRESS NOTES
"Chief Complaint  Post-op Follow-up (2 week post op/knot on left breast)    Subjective          History of Present Illness  Cassandra Maria is a 63 y.o. female who presents to [facility] for Postoperative Follow-Up of Bilateral breast scar revision 11/7/23.    Pt presents today for 2 week post op. She states she noticed a knot on rt breast incision last night. She states she believes the incisions is tender to touch.  She states she does have small opening on left breast, maybe a missing suture.    Allergies: Gabapentin  Allergies Reconciled.    Review of Systems   Constitutional: Negative.    HENT: Negative.     Eyes: Negative.    Respiratory: Negative.     Cardiovascular: Negative.    Gastrointestinal: Negative.    Endocrine: Negative.    Genitourinary:  Positive for breast pain.   Musculoskeletal: Negative.    Skin:  Negative for wound and bruise.   Allergic/Immunologic: Negative.    Neurological: Negative.    Hematological: Negative.    Psychiatric/Behavioral: Negative.        All review of system has been reviewed and it  is negative except the ones note above.     Objective     /81 (BP Location: Left arm, Patient Position: Sitting, Cuff Size: Adult)   Pulse 74   Temp 97.7 °F (36.5 °C) (Temporal)   Ht 152.4 cm (60\")   Wt 85.3 kg (188 lb)   SpO2 98%   BMI 36.72 kg/m²     Body mass index is 36.72 kg/m².    Physical Exam   Vitals reviewed.  Constitutional  She appears well-developed and well-nourished.     Eyes  System normal.       Cardiovascular: Normal rate.     Pulmonary/Chest  Effort normal.     Skin  Skin is warm and dry.     Psychiatric  She has a normal mood and affect.     Breast: incisions healing well, spitting sutures on bilateral breast, left lateral breast tender with small hematoma noted, superficial open area in incision above the hematoma      Result Review :       No new results to review this visit    Assessment and Plan  "     Diagnoses and all orders for this visit:    1. Hematoma (Primary)  -     amoxicillin-clavulanate (AUGMENTIN) 875-125 MG per tablet; Take 1 tablet by mouth 2 (Two) Times a Day for 10 days.  Dispense: 20 tablet; Refill: 0    2. Postoperative follow-up        Plan:  Removed bilateral breast spitting sutures.   Applied matisol and steri strips to bilateral end of incisions.   Can massage left hematoma. Keep incisions clean and dry.   Advised patient to keep steri strips dry and can dry them on cool setting on blow dryer.   Patient is aware to call the office with any other concerns.   We will call in antibiotics for precautions due to hematoma and going into holiday weekend.   RTC at scheduled appointment.      Scribed by Maddie Almanza MA, acting as a scribe for GENESIS Wiley, 11/21/23 16:14 EST.  EGNESIS Wiley's signature on the note affirms that the note adequately documents the care provided.          Patient was given instructions and counseling regarding her condition. Please see specific information pulled into the AVS if appropriate.     Maddie Almanza MA  11/21/2023

## 2023-11-24 DIAGNOSIS — I10 HYPERTENSION, UNSPECIFIED TYPE: ICD-10-CM

## 2023-11-24 PROBLEM — Z09 POSTOPERATIVE FOLLOW-UP: Status: ACTIVE | Noted: 2023-11-24

## 2023-11-27 RX ORDER — LISINOPRIL 5 MG/1
5 TABLET ORAL DAILY
Qty: 90 TABLET | Refills: 0 | Status: SHIPPED | OUTPATIENT
Start: 2023-11-27

## 2023-11-29 ENCOUNTER — OFFICE VISIT (OUTPATIENT)
Dept: PLASTIC SURGERY | Facility: CLINIC | Age: 63
End: 2023-11-29
Payer: MEDICAID

## 2023-11-29 VITALS
WEIGHT: 188.2 LBS | BODY MASS INDEX: 36.95 KG/M2 | TEMPERATURE: 97.7 F | HEART RATE: 72 BPM | HEIGHT: 60 IN | OXYGEN SATURATION: 97 % | SYSTOLIC BLOOD PRESSURE: 158 MMHG | DIASTOLIC BLOOD PRESSURE: 89 MMHG

## 2023-11-29 DIAGNOSIS — L91.0 HYPERTROPHIC SCAR: ICD-10-CM

## 2023-11-29 DIAGNOSIS — Z09 POSTOPERATIVE FOLLOW-UP: Primary | ICD-10-CM

## 2023-11-29 PROCEDURE — 99024 POSTOP FOLLOW-UP VISIT: CPT | Performed by: SURGERY

## 2023-12-04 PROBLEM — M79.89 FAT NECROSIS: Status: RESOLVED | Noted: 2023-04-11 | Resolved: 2023-12-04

## 2023-12-07 DIAGNOSIS — E55.9 VITAMIN D DEFICIENCY: ICD-10-CM

## 2023-12-07 RX ORDER — ERGOCALCIFEROL 1.25 MG/1
CAPSULE ORAL
Qty: 13 CAPSULE | Refills: 1 | Status: SHIPPED | OUTPATIENT
Start: 2023-12-07

## 2023-12-16 DIAGNOSIS — E11.9 TYPE 2 DIABETES MELLITUS WITHOUT COMPLICATION, WITHOUT LONG-TERM CURRENT USE OF INSULIN: ICD-10-CM

## 2023-12-18 RX ORDER — DULAGLUTIDE 1.5 MG/.5ML
INJECTION, SOLUTION SUBCUTANEOUS
Qty: 2 ML | Refills: 2 | Status: SHIPPED | OUTPATIENT
Start: 2023-12-18

## 2024-01-19 RX ORDER — ATORVASTATIN CALCIUM 10 MG/1
10 TABLET, FILM COATED ORAL DAILY
Qty: 90 TABLET | Refills: 0 | Status: SHIPPED | OUTPATIENT
Start: 2024-01-19

## 2024-01-29 ENCOUNTER — TELEPHONE (OUTPATIENT)
Dept: FAMILY MEDICINE CLINIC | Facility: CLINIC | Age: 64
End: 2024-01-29
Payer: MEDICAID

## 2024-01-29 ENCOUNTER — TELEPHONE (OUTPATIENT)
Dept: PLASTIC SURGERY | Facility: CLINIC | Age: 64
End: 2024-01-29
Payer: MEDICAID

## 2024-01-29 NOTE — TELEPHONE ENCOUNTER
LVM FOR PATIENT TO RESCHEDULE APPOINTMENT ON 02/08/24.    PLEASE TRANSFER TO THE OFFICE WHEN PATIENT RETURNS PHONE CALL.

## 2024-01-29 NOTE — TELEPHONE ENCOUNTER
Caller: Cassandra Maria    Relationship to patient: Self    Best call back number: [phone]    Patient is needing: PATIENT CALLED STATING HER PHARMACY WAS ON BACK ORDER FOR HER TRULICITY AND WOULD BE ON BACK ORDER UNTIL AT LEAST NEXT MONTH. PATIENT STATES THE PHARMACIST TOLD HER TO REACH OUT TO HER PCP TO SEE IF THERE WAS ANY ALTERNATIVE MEDICATION SHE WOULD LIKE TO CALL INSTEAD. PATIENT STATES SHE HAS BEEN WITHOUT THIS MEDICATION FOR OVER A WEEK AND USES [facility] [place] IF GENESIS GILES WOULD LIKE TO CALL HER ANY OTHER MEDICATION IN. PLEASE CALL PATIENT BACK TO LET HER KNOW HOW TO PROCEED.

## 2024-01-30 ENCOUNTER — TELEPHONE (OUTPATIENT)
Dept: FAMILY MEDICINE CLINIC | Facility: CLINIC | Age: 64
End: 2024-01-30
Payer: MEDICAID

## 2024-01-30 NOTE — TELEPHONE ENCOUNTER
Pt called back - advised a lot of medications in this class are currently on backorder, but Marquita sent in prescription for Mounjaro. Pt stated she got a call from pharmacy stating she had a prescription ready for  but she wasn't sure what medication that was. Advised pt to let us know what medication she was able to get.

## 2024-01-30 NOTE — TELEPHONE ENCOUNTER
Please notify patient that a large percentage of medications in this class are currently on backorder, I will try sending a prescription for Mounjaro, let me know if you are not able to get it picked up

## 2024-01-30 NOTE — TELEPHONE ENCOUNTER
Caller: Cassandra Maria    Relationship: Self    Best call back number: [phone]    What was the call regarding: PATIENT WANTED TO LET CLINICAL STAFF KNOW THAT PHARMACY DOES HAVE THE MEDICATION IN STOCK. THERE IS A SIGNED TELEPHONE ENCOUNTER DATED 01/29/2024 REGARDING THIS.

## 2024-02-01 RX ORDER — FLUTICASONE PROPIONATE 50 MCG
2 SPRAY, SUSPENSION (ML) NASAL DAILY
Qty: 48 G | Refills: 0 | OUTPATIENT
Start: 2024-02-01

## 2024-02-01 RX ORDER — FLUTICASONE PROPIONATE 50 MCG
2 SPRAY, SUSPENSION (ML) NASAL DAILY
Qty: 16 G | Refills: 1 | Status: SHIPPED | OUTPATIENT
Start: 2024-02-01

## 2024-02-05 ENCOUNTER — OFFICE VISIT (OUTPATIENT)
Dept: FAMILY MEDICINE CLINIC | Facility: CLINIC | Age: 64
End: 2024-02-05
Payer: MEDICAID

## 2024-02-05 VITALS
BODY MASS INDEX: 37.89 KG/M2 | HEART RATE: 83 BPM | WEIGHT: 193 LBS | OXYGEN SATURATION: 97 % | SYSTOLIC BLOOD PRESSURE: 128 MMHG | DIASTOLIC BLOOD PRESSURE: 69 MMHG | HEIGHT: 60 IN

## 2024-02-05 DIAGNOSIS — E11.9 TYPE 2 DIABETES MELLITUS WITHOUT COMPLICATION, WITHOUT LONG-TERM CURRENT USE OF INSULIN: ICD-10-CM

## 2024-02-05 DIAGNOSIS — L65.9 HAIR LOSS: ICD-10-CM

## 2024-02-05 DIAGNOSIS — I10 HYPERTENSION, UNSPECIFIED TYPE: ICD-10-CM

## 2024-02-05 DIAGNOSIS — B00.9 HSV-1 (HERPES SIMPLEX VIRUS 1) INFECTION: Primary | ICD-10-CM

## 2024-02-05 DIAGNOSIS — E78.5 HYPERLIPIDEMIA, UNSPECIFIED HYPERLIPIDEMIA TYPE: ICD-10-CM

## 2024-02-05 PROCEDURE — 1159F MED LIST DOCD IN RCRD: CPT | Performed by: NURSE PRACTITIONER

## 2024-02-05 PROCEDURE — 99214 OFFICE O/P EST MOD 30 MIN: CPT | Performed by: NURSE PRACTITIONER

## 2024-02-05 PROCEDURE — 3074F SYST BP LT 130 MM HG: CPT | Performed by: NURSE PRACTITIONER

## 2024-02-05 PROCEDURE — 1160F RVW MEDS BY RX/DR IN RCRD: CPT | Performed by: NURSE PRACTITIONER

## 2024-02-05 PROCEDURE — 3078F DIAST BP <80 MM HG: CPT | Performed by: NURSE PRACTITIONER

## 2024-02-05 RX ORDER — LISINOPRIL 5 MG/1
5 TABLET ORAL DAILY
Qty: 90 TABLET | Refills: 1 | Status: SHIPPED | OUTPATIENT
Start: 2024-02-05

## 2024-02-05 RX ORDER — ACYCLOVIR 50 MG/G
1 OINTMENT TOPICAL
Qty: 5 G | Refills: 0 | Status: SHIPPED | OUTPATIENT
Start: 2024-02-05

## 2024-02-05 RX ORDER — ATORVASTATIN CALCIUM 10 MG/1
10 TABLET, FILM COATED ORAL DAILY
Qty: 90 TABLET | Refills: 1 | Status: SHIPPED | OUTPATIENT
Start: 2024-02-05

## 2024-02-05 NOTE — ASSESSMENT & PLAN NOTE
Just switched from trulicity to mounjaro, tolerating well thus far, discussed with patient if she is tolerating well after her fourth injection to call and we will increase dose

## 2024-02-05 NOTE — PROGRESS NOTES
Chief Complaint  Hypertension, Diabetes, Vitamin D Deficiency, and Hyperlipidemia    SUBJECTIVE  Cassandra Maria presents to [facility] for 6 month follow up.     Pt recently had to switch to Mounjaro from Trulicity due to stock issue, pt took her first dose of Mounjaro today and is doing well so far.       Patient notes over the last year or so her hair has gotten thinner, would like to check her thyroid levels    Patient requesting refill of Zovirax, has occasional fever blisters    History of Present Illness  Past Medical History:   Diagnosis Date    Condition not found     Hernia    Diabetes mellitus     BG RUNS AROUND  IN AM    Foot pain, right 2018    Fracture of 5th metatarsal 2018    Gall stones     Heel pain     Hot flashes 04/10/2014    Impaired fasting glucose 2018    Joint pain 2014    Low back pain     Lumbar pain with radiation down left leg 2015    Macromastia     Migraine headache     Night sweats     Obesity 2018    Osteoporosis 2014    Pain in thoracic spine 04/10/2014    Sinus trouble     Vitamin D deficiency 04/10/2014      Family History   Problem Relation Age of Onset    Breast cancer Mother     Diabetes Mother     Liver cancer Father     Lung cancer Father     Breast cancer Sister     Diabetes Sister     Colon cancer Paternal Aunt 67         age not listed    Diabetes Paternal Aunt     Malig Hyperthermia Neg Hx       Past Surgical History:   Procedure Laterality Date    BILATERAL BREAST REDUCTION Bilateral 2022    Procedure: BREAST REDUCTION BILATERAL;  Surgeon: Daniele Walker MD;  Location: [facility];  Service: Plastics;  Laterality: Bilateral;    BILATERAL BREAST REDUCTION Bilateral 2023    Procedure: Bilateral breast scar revision.;  Surgeon: Daniele Walker MD;  Location: [facility] OR [facility];  Service: Plastics;  Laterality: Bilateral;     SECTION       "CHOLECYSTECTOMY      COLONOSCOPY  11/18/2016    LANI    COLONOSCOPY N/A 4/27/2023    Procedure: COLONOSCOPY;  Surgeon: Emiliano Jack MD;  Location: [facility];  Service: Gastroenterology;  Laterality: N/A;  HEMORRHOIDS    ENDOSCOPY  2016 2002    HYSTERECTOMY      INCONTINENCE SURGERY      ROTATOR CUFF REPAIR      right    VENTRAL HERNIA REPAIR          Current Outpatient Medications:     atorvastatin (LIPITOR) 10 MG tablet, Take 1 tablet by mouth Daily., Disp: 90 tablet, Rfl: 1    diclofenac (VOLTAREN) 75 MG EC tablet, TAKE 1 TABLET BY MOUTH TWICE DAILY (Patient taking differently: 1 tablet Daily As Needed.), Disp: 180 tablet, Rfl: 1    Diclofenac Sodium (VOLTAREN) 1 % gel gel, Apply 4 g topically to the appropriate area as directed 4 (Four) Times a Day As Needed (pain)., Disp: 150 g, Rfl: 2    fluticasone (FLONASE) 50 MCG/ACT nasal spray, USE 2 SPRAYS IN EACH NOSTRIL EVERY DAY, Disp: 16 g, Rfl: 1    lisinopril (PRINIVIL,ZESTRIL) 5 MG tablet, Take 1 tablet by mouth Daily., Disp: 90 tablet, Rfl: 1    metFORMIN (GLUCOPHAGE) 1000 MG tablet, Take 1 tablet by mouth 2 (Two) Times a Day With Meals., Disp: 180 tablet, Rfl: 1    multivitamin with minerals (HAIR SKIN & NAILS ADVANCED PO), Take 1 tablet by mouth Daily., Disp: , Rfl:     Tirzepatide (MOUNJARO) 2.5 MG/0.5ML solution pen-injector pen, Inject 0.5 mL under the skin into the appropriate area as directed 1 (One) Time Per Week., Disp: 2 mL, Rfl: 2    vitamin D (ERGOCALCIFEROL) 1.25 MG (42063 UT) capsule capsule, TAKE 1 CAPSULE BY MOUTH EVERY 7 DAYS, Disp: 13 capsule, Rfl: 1    acyclovir (Zovirax) 5 % ointment, Apply 1 Application topically to the appropriate area as directed Every 3 (Three) Hours., Disp: 5 g, Rfl: 0    OBJECTIVE  Vital Signs:   /69   Pulse 83   Ht 152.4 cm (60\")   Wt 87.5 kg (193 lb)   SpO2 97%   BMI 37.69 kg/m²    Estimated body mass index is 37.69 kg/m² as calculated from the following:    Height as of this encounter: " "152.4 cm (60\").    Weight as of this encounter: 87.5 kg (193 lb).     Wt Readings from Last 3 Encounters:   02/05/24 87.5 kg (193 lb)   11/29/23 85.4 kg (188 lb 3.2 oz)   11/21/23 85.3 kg (188 lb)     BP Readings from Last 3 Encounters:   02/05/24 128/69   11/29/23 158/89   11/21/23 125/81       Physical Exam  Vitals reviewed.   Constitutional:       Appearance: Normal appearance. She is well-developed.   HENT:      Head: Normocephalic and atraumatic.      Right Ear: External ear normal.      Left Ear: External ear normal.   Eyes:      Conjunctiva/sclera: Conjunctivae normal.      Pupils: Pupils are equal, round, and reactive to light.   Cardiovascular:      Rate and Rhythm: Normal rate and regular rhythm.      Heart sounds: No murmur heard.     No friction rub. No gallop.   Pulmonary:      Effort: Pulmonary effort is normal.      Breath sounds: Normal breath sounds. No wheezing or rhonchi.   Skin:     General: Skin is warm and dry.   Neurological:      Mental Status: She is alert and oriented to person, place, and time.      Cranial Nerves: No cranial nerve deficit.   Psychiatric:         Mood and Affect: Mood and affect normal.         Behavior: Behavior normal.         Thought Content: Thought content normal.         Judgment: Judgment normal.          Result Review    CMP          8/8/2023    09:21 10/31/2023    12:40   CMP   Glucose 89  89    BUN 10  9    Creatinine 0.67  0.65    EGFR 99.0  99.1    Sodium 140  139    Potassium 3.8  4.3    Chloride 103  105    Calcium 9.8  9.7    Total Protein 6.9     Albumin 4.6     Globulin 2.3     Total Bilirubin 1.2     Alkaline Phosphatase 47     AST (SGOT) 23     ALT (SGPT) 26     Albumin/Globulin Ratio 2.0     BUN/Creatinine Ratio 14.9  13.8    Anion Gap 13.0  9.2      CBC          8/8/2023    09:21   CBC   WBC 5.16    RBC 4.62    Hemoglobin 14.1    Hematocrit 42.0    MCV 90.9    MCH 30.5    MCHC 33.6    RDW 11.8    Platelets 280      Lipid Panel          8/8/2023    " 09:21   Lipid Panel   Total Cholesterol 144    Triglycerides 103    HDL Cholesterol 64    VLDL Cholesterol 19    LDL Cholesterol  61    LDL/HDL Ratio 0.93      TSH          8/8/2023    09:21   TSH   TSH 2.200      Most Recent A1C          8/8/2023    09:21   HGBA1C Most Recent   Hemoglobin A1C 5.20        No Images in the past 120 days found..     The above data has been reviewed by GENESIS Gates 02/05/2024 14:06 EST.          Patient Care Team:  Marquita Mata APRN as PCP - General (Nurse Practitioner)  Daniele Walker MD as Consulting Physician (Plastic Surgery)  Tova Childs APRN as Nurse Practitioner (Plastic Surgery)            ASSESSMENT & PLAN    Diagnoses and all orders for this visit:    1. HSV-1 (herpes simplex virus 1) infection (Primary)  -     acyclovir (Zovirax) 5 % ointment; Apply 1 Application topically to the appropriate area as directed Every 3 (Three) Hours.  Dispense: 5 g; Refill: 0    2. Type 2 diabetes mellitus without complication, without long-term current use of insulin  Assessment & Plan:  Just switched from trulicity to mounjaro, tolerating well thus far, discussed with patient if she is tolerating well after her fourth injection to call and we will increase dose    Orders:  -     Comprehensive Metabolic Panel; Future  -     Lipid Panel; Future  -     Hemoglobin A1c; Future  -     Microalbumin / Creatinine Urine Ratio - Urine, Clean Catch; Future  -     TSH+Free T4; Future  -     metFORMIN (GLUCOPHAGE) 1000 MG tablet; Take 1 tablet by mouth 2 (Two) Times a Day With Meals.  Dispense: 180 tablet; Refill: 1    3. Hair loss  -     TSH+Free T4; Future    4. Hypertension, unspecified type  Overview:  Well-controlled, continue lisinopril    Orders:  -     lisinopril (PRINIVIL,ZESTRIL) 5 MG tablet; Take 1 tablet by mouth Daily.  Dispense: 90 tablet; Refill: 1    5. Hyperlipidemia, unspecified hyperlipidemia type  Overview:  Stable on Lipitor, continue current  medication    Orders:  -     atorvastatin (LIPITOR) 10 MG tablet; Take 1 tablet by mouth Daily.  Dispense: 90 tablet; Refill: 1         Tobacco Use: Low Risk  (2/5/2024)    Patient History     Smoking Tobacco Use: Never     Smokeless Tobacco Use: Never     Passive Exposure: Never       Follow Up     Return in about 6 months (around 8/5/2024), or if symptoms worsen or fail to improve.        Patient was given instructions and counseling regarding her condition or for health maintenance advice. Please see specific information pulled into the AVS if appropriate.   I have reviewed information obtained and documented by others and I have confirmed the accuracy of this documented note.    GENESIS Gates

## 2024-02-07 ENCOUNTER — LAB (OUTPATIENT)
Dept: LAB | Facility: HOSPITAL | Age: 64
End: 2024-02-07
Payer: MEDICAID

## 2024-02-07 DIAGNOSIS — L65.9 HAIR LOSS: ICD-10-CM

## 2024-02-07 DIAGNOSIS — E11.9 TYPE 2 DIABETES MELLITUS WITHOUT COMPLICATION, WITHOUT LONG-TERM CURRENT USE OF INSULIN: ICD-10-CM

## 2024-02-07 LAB
ALBUMIN SERPL-MCNC: 4.4 G/DL (ref 3.5–5.2)
ALBUMIN UR-MCNC: 2.2 MG/DL
ALBUMIN/GLOB SERPL: 1.7 G/DL
ALP SERPL-CCNC: 56 U/L (ref 39–117)
ALT SERPL W P-5'-P-CCNC: 26 U/L (ref 1–33)
ANION GAP SERPL CALCULATED.3IONS-SCNC: 12.5 MMOL/L (ref 5–15)
AST SERPL-CCNC: 19 U/L (ref 1–32)
BILIRUB SERPL-MCNC: 1.9 MG/DL (ref 0–1.2)
BUN SERPL-MCNC: 13 MG/DL (ref 8–23)
BUN/CREAT SERPL: 16.9 (ref 7–25)
CALCIUM SPEC-SCNC: 9.6 MG/DL (ref 8.6–10.5)
CHLORIDE SERPL-SCNC: 104 MMOL/L (ref 98–107)
CHOLEST SERPL-MCNC: 159 MG/DL (ref 0–200)
CO2 SERPL-SCNC: 23.5 MMOL/L (ref 22–29)
CREAT SERPL-MCNC: 0.77 MG/DL (ref 0.57–1)
CREAT UR-MCNC: 203.1 MG/DL
EGFRCR SERPLBLD CKD-EPI 2021: 86.8 ML/MIN/1.73
GLOBULIN UR ELPH-MCNC: 2.6 GM/DL
GLUCOSE SERPL-MCNC: 90 MG/DL (ref 65–99)
HBA1C MFR BLD: 5 % (ref 4.8–5.6)
HDLC SERPL-MCNC: 65 MG/DL (ref 40–60)
LDLC SERPL CALC-MCNC: 78 MG/DL (ref 0–100)
LDLC/HDLC SERPL: 1.18 {RATIO}
MICROALBUMIN/CREAT UR: 10.8 MG/G (ref 0–29)
POTASSIUM SERPL-SCNC: 4.1 MMOL/L (ref 3.5–5.2)
PROT SERPL-MCNC: 7 G/DL (ref 6–8.5)
SODIUM SERPL-SCNC: 140 MMOL/L (ref 136–145)
T4 FREE SERPL-MCNC: 1.25 NG/DL (ref 0.93–1.7)
TRIGL SERPL-MCNC: 85 MG/DL (ref 0–150)
TSH SERPL DL<=0.05 MIU/L-ACNC: 1.9 UIU/ML (ref 0.27–4.2)
VLDLC SERPL-MCNC: 16 MG/DL (ref 5–40)

## 2024-02-07 PROCEDURE — 36415 COLL VENOUS BLD VENIPUNCTURE: CPT

## 2024-02-07 PROCEDURE — 83036 HEMOGLOBIN GLYCOSYLATED A1C: CPT

## 2024-02-07 PROCEDURE — 80053 COMPREHEN METABOLIC PANEL: CPT

## 2024-02-07 PROCEDURE — 84439 ASSAY OF FREE THYROXINE: CPT

## 2024-02-07 PROCEDURE — 84443 ASSAY THYROID STIM HORMONE: CPT

## 2024-02-07 PROCEDURE — 82570 ASSAY OF URINE CREATININE: CPT

## 2024-02-07 PROCEDURE — 82043 UR ALBUMIN QUANTITATIVE: CPT

## 2024-02-07 PROCEDURE — 80061 LIPID PANEL: CPT

## 2024-02-08 DIAGNOSIS — R17 ELEVATED BILIRUBIN: Primary | ICD-10-CM

## 2024-02-13 ENCOUNTER — TRANSCRIBE ORDERS (OUTPATIENT)
Dept: ADMINISTRATIVE | Facility: HOSPITAL | Age: 64
End: 2024-02-13
Payer: MEDICAID

## 2024-02-13 DIAGNOSIS — Z12.31 VISIT FOR SCREENING MAMMOGRAM: Primary | ICD-10-CM

## 2024-02-15 ENCOUNTER — TELEPHONE (OUTPATIENT)
Dept: FAMILY MEDICINE CLINIC | Facility: CLINIC | Age: 64
End: 2024-02-15
Payer: MEDICAID

## 2024-02-19 NOTE — PROGRESS NOTES
"Chief Complaint  Post-op Follow-up (3m post op)    Subjective  I'm all healed up        History of Present Illness  Cassandra Maria is a 63 y.o. female who presents to [facility] for Postoperative Follow-Up of Bilateral breast scar revision 11/7/23.    Doing well no concerns.       Allergies: Gabapentin  Allergies Reconciled.    Review of Systems   Constitutional: Negative.    HENT: Negative.     Eyes: Negative.    Respiratory: Negative.     Cardiovascular: Negative.    Gastrointestinal: Negative.    Endocrine: Negative.    Genitourinary: Negative.    Musculoskeletal: Negative.    Skin:  Positive for rash.   Allergic/Immunologic: Negative.    Neurological: Negative.    Hematological: Negative.    Psychiatric/Behavioral: Negative.        All review of system has been reviewed and it  is negative except the ones note above.     Objective     /78 (BP Location: Left arm, Patient Position: Sitting, Cuff Size: Large Adult)   Pulse 77   Ht 152.4 cm (60\")   Wt 86.2 kg (190 lb)   SpO2 97%   BMI 37.11 kg/m²     Body mass index is 37.11 kg/m².    Physical Exam   Vitals reviewed.  Constitutional  She appears well-developed and well-nourished.     Eyes  System normal.       Cardiovascular: Normal rate.     Pulmonary/Chest  Effort normal.     Skin  Skin is warm and dry.     Psychiatric  She has a normal mood and affect.     Breast: Incisions healed well, no open areas, good symmetry. Scar revision well healed also    Rash on arms improving with antibiotics and steroids    Result Review : no new results to review this visit        Assessment and Plan      Diagnoses and all orders for this visit:    1. Macromastia (Primary)          Plan:  Photos obtained today. Patient is happy with results. RTC as PRN. Patient is aware to call the office with any other concerns.  [facility] has mammo scheduled approx 1 month    Scribed by Maddie Almanza MA, acting as a scribe for " GENESIS Wiley, 02/29/24 14:33 EST.  GENESIS Wiley's signature on the note affirms that the note adequately documents the care provided.            Patient was given instructions and counseling regarding her condition. Please see specific information pulled into the AVS if appropriate.     Maddie Almanza MA  02/29/2024

## 2024-02-29 ENCOUNTER — OFFICE VISIT (OUTPATIENT)
Dept: PLASTIC SURGERY | Facility: CLINIC | Age: 64
End: 2024-02-29
Payer: MEDICAID

## 2024-02-29 VITALS
HEIGHT: 60 IN | DIASTOLIC BLOOD PRESSURE: 78 MMHG | WEIGHT: 190 LBS | HEART RATE: 77 BPM | BODY MASS INDEX: 37.3 KG/M2 | SYSTOLIC BLOOD PRESSURE: 119 MMHG | OXYGEN SATURATION: 97 %

## 2024-02-29 DIAGNOSIS — L91.0 HYPERTROPHIC SCAR: ICD-10-CM

## 2024-02-29 DIAGNOSIS — Z09 POSTOPERATIVE FOLLOW-UP: ICD-10-CM

## 2024-02-29 DIAGNOSIS — N62 MACROMASTIA: Primary | ICD-10-CM

## 2024-03-29 ENCOUNTER — HOSPITAL ENCOUNTER (OUTPATIENT)
Dept: MAMMOGRAPHY | Facility: HOSPITAL | Age: 64
Discharge: HOME | End: 2024-03-29
Admitting: NURSE PRACTITIONER
Payer: MEDICAID

## 2024-03-29 DIAGNOSIS — Z12.31 VISIT FOR SCREENING MAMMOGRAM: ICD-10-CM

## 2024-03-29 PROCEDURE — 77067 SCR MAMMO BI INCL CAD: CPT

## 2024-03-29 PROCEDURE — 77063 BREAST TOMOSYNTHESIS BI: CPT

## 2024-04-01 ENCOUNTER — TELEPHONE (OUTPATIENT)
Dept: FAMILY MEDICINE CLINIC | Facility: CLINIC | Age: 64
End: 2024-04-01
Payer: MEDICAID

## 2024-04-01 NOTE — TELEPHONE ENCOUNTER
HUB TO RELAY:    Left VM to return call. Please inform patient of lab results:       ----- Message from GENESIS Braden sent at 4/1/2024  9:38 AM EDT -----      No mammographic signs of malignancy, recheck in 1 year

## 2024-04-04 RX ORDER — FLUTICASONE PROPIONATE 50 MCG
2 SPRAY, SUSPENSION (ML) NASAL DAILY
Qty: 48 G | Refills: 1 | Status: SHIPPED | OUTPATIENT
Start: 2024-04-04

## 2024-04-12 RX ORDER — TIRZEPATIDE 5 MG/.5ML
INJECTION, SOLUTION SUBCUTANEOUS
Qty: 2 ML | Refills: 1 | Status: SHIPPED | OUTPATIENT
Start: 2024-04-12

## 2024-04-16 NOTE — TELEPHONE ENCOUNTER
Pt called and reported Laurie is out of the 5 mg dosage on her monjouro. Pt is asking if she can take the 2.5 dose this month.     Please advise.

## 2024-04-24 DIAGNOSIS — E78.5 HYPERLIPIDEMIA, UNSPECIFIED HYPERLIPIDEMIA TYPE: ICD-10-CM

## 2024-04-24 RX ORDER — ATORVASTATIN CALCIUM 10 MG/1
10 TABLET, FILM COATED ORAL DAILY
Qty: 90 TABLET | Refills: 1 | Status: SHIPPED | OUTPATIENT
Start: 2024-04-24

## 2024-04-30 ENCOUNTER — TELEPHONE (OUTPATIENT)
Dept: FAMILY MEDICINE CLINIC | Facility: CLINIC | Age: 64
End: 2024-04-30
Payer: MEDICAID

## 2024-05-06 ENCOUNTER — LAB (OUTPATIENT)
Dept: LAB | Facility: HOSPITAL | Age: 64
End: 2024-05-06
Payer: MEDICAID

## 2024-05-06 DIAGNOSIS — R17 ELEVATED BILIRUBIN: ICD-10-CM

## 2024-05-06 PROCEDURE — 36415 COLL VENOUS BLD VENIPUNCTURE: CPT

## 2024-05-06 PROCEDURE — 80076 HEPATIC FUNCTION PANEL: CPT

## 2024-05-07 DIAGNOSIS — R17 ELEVATED BILIRUBIN: Primary | ICD-10-CM

## 2024-05-07 LAB
ALBUMIN SERPL-MCNC: 4.4 G/DL (ref 3.5–5.2)
ALP SERPL-CCNC: 44 U/L (ref 39–117)
ALT SERPL W P-5'-P-CCNC: 20 U/L (ref 1–33)
AST SERPL-CCNC: 19 U/L (ref 1–32)
BILIRUB CONJ SERPL-MCNC: 0.3 MG/DL (ref 0–0.3)
BILIRUB INDIRECT SERPL-MCNC: 1.1 MG/DL
BILIRUB SERPL-MCNC: 1.4 MG/DL (ref 0–1.2)
PROT SERPL-MCNC: 6.5 G/DL (ref 6–8.5)

## 2024-06-04 DIAGNOSIS — E55.9 VITAMIN D DEFICIENCY: ICD-10-CM

## 2024-06-04 RX ORDER — ERGOCALCIFEROL 1.25 MG/1
CAPSULE ORAL
Qty: 13 CAPSULE | Refills: 1 | Status: SHIPPED | OUTPATIENT
Start: 2024-06-04

## 2024-06-06 ENCOUNTER — HOSPITAL ENCOUNTER (OUTPATIENT)
Dept: ULTRASOUND IMAGING | Facility: HOSPITAL | Age: 64
Discharge: HOME | End: 2024-06-06
Payer: MEDICAID

## 2024-06-06 DIAGNOSIS — K83.8 DILATED BILE DUCT: ICD-10-CM

## 2024-06-06 DIAGNOSIS — R17 ELEVATED BILIRUBIN: ICD-10-CM

## 2024-06-06 DIAGNOSIS — K76.0 HEPATIC STEATOSIS: Primary | ICD-10-CM

## 2024-06-06 PROCEDURE — 76705 ECHO EXAM OF ABDOMEN: CPT

## 2024-06-12 ENCOUNTER — LAB (OUTPATIENT)
Dept: LAB | Facility: HOSPITAL | Age: 64
End: 2024-06-12
Payer: MEDICAID

## 2024-06-12 ENCOUNTER — OFFICE VISIT (OUTPATIENT)
Dept: GASTROENTEROLOGY | Facility: CLINIC | Age: 64
End: 2024-06-12
Payer: MEDICAID

## 2024-06-12 VITALS
WEIGHT: 183.1 LBS | BODY MASS INDEX: 35.95 KG/M2 | DIASTOLIC BLOOD PRESSURE: 61 MMHG | SYSTOLIC BLOOD PRESSURE: 140 MMHG | HEART RATE: 71 BPM | OXYGEN SATURATION: 100 % | HEIGHT: 60 IN

## 2024-06-12 DIAGNOSIS — K76.0 FATTY LIVER: ICD-10-CM

## 2024-06-12 DIAGNOSIS — R17 ELEVATED BILIRUBIN: ICD-10-CM

## 2024-06-12 DIAGNOSIS — R74.8 ELEVATED LIVER ENZYMES: Primary | ICD-10-CM

## 2024-06-12 DIAGNOSIS — K83.8 COMMON BILE DUCT DILATION: ICD-10-CM

## 2024-06-12 DIAGNOSIS — R74.8 ELEVATED LIVER ENZYMES: ICD-10-CM

## 2024-06-12 LAB
ALBUMIN SERPL-MCNC: 4.3 G/DL (ref 3.5–5.2)
ALP SERPL-CCNC: 51 U/L (ref 39–117)
ALPHA-FETOPROTEIN: 3.21 NG/ML (ref 0–8.3)
ALPHA1 GLOB MFR UR ELPH: 149 MG/DL (ref 90–200)
ALT SERPL W P-5'-P-CCNC: 19 U/L (ref 1–33)
AST SERPL-CCNC: 21 U/L (ref 1–32)
BILIRUB CONJ SERPL-MCNC: 0.3 MG/DL (ref 0–0.3)
BILIRUB INDIRECT SERPL-MCNC: 1.1 MG/DL
BILIRUB SERPL-MCNC: 1.4 MG/DL (ref 0–1.2)
CERULOPLASMIN SERPL-MCNC: 20 MG/DL (ref 19–39)
FERRITIN SERPL-MCNC: 143 NG/ML (ref 13–150)
HAV IGM SERPL QL IA: NORMAL
HBV CORE IGM SERPL QL IA: NORMAL
HBV SURFACE AG SERPL QL IA: NORMAL
HCV AB SER QL: NORMAL
INR PPP: 0.97 (ref 0.86–1.15)
IRON 24H UR-MRATE: 68 MCG/DL (ref 37–145)
IRON SATN MFR SERPL: 16 % (ref 20–50)
PROT SERPL-MCNC: 6.8 G/DL (ref 6–8.5)
PROTHROMBIN TIME: 13.1 SECONDS (ref 11.8–14.9)
TIBC SERPL-MCNC: 431 MCG/DL (ref 298–536)
TRANSFERRIN SERPL-MCNC: 289 MG/DL (ref 200–360)

## 2024-06-12 PROCEDURE — 84466 ASSAY OF TRANSFERRIN: CPT

## 2024-06-12 PROCEDURE — 80074 ACUTE HEPATITIS PANEL: CPT

## 2024-06-12 PROCEDURE — 86334 IMMUNOFIX E-PHORESIS SERUM: CPT

## 2024-06-12 PROCEDURE — 82103 ALPHA-1-ANTITRYPSIN TOTAL: CPT

## 2024-06-12 PROCEDURE — 80076 HEPATIC FUNCTION PANEL: CPT

## 2024-06-12 PROCEDURE — 86038 ANTINUCLEAR ANTIBODIES: CPT

## 2024-06-12 PROCEDURE — 82525 ASSAY OF COPPER: CPT

## 2024-06-12 PROCEDURE — 83540 ASSAY OF IRON: CPT

## 2024-06-12 PROCEDURE — 86015 ACTIN ANTIBODY EACH: CPT

## 2024-06-12 PROCEDURE — 82784 ASSAY IGA/IGD/IGG/IGM EACH: CPT

## 2024-06-12 PROCEDURE — 86381 MITOCHONDRIAL ANTIBODY EACH: CPT

## 2024-06-12 PROCEDURE — 82105 ALPHA-FETOPROTEIN SERUM: CPT

## 2024-06-12 PROCEDURE — 85610 PROTHROMBIN TIME: CPT

## 2024-06-12 PROCEDURE — 82390 ASSAY OF CERULOPLASMIN: CPT

## 2024-06-12 PROCEDURE — 36415 COLL VENOUS BLD VENIPUNCTURE: CPT

## 2024-06-12 PROCEDURE — 82728 ASSAY OF FERRITIN: CPT

## 2024-06-12 NOTE — PROGRESS NOTES
Chief Complaint  Follow-up and Fatty liver    Cassandra Maria is a 63 y.o. female who presents to [facility] [place] for elevated bilirubin and dilated common bile duct    History of present Illness  Patient presents to the office for elevated bilirubin and dilated common bile duct.  Order has been placed for MRI MRCP by PCP, not yet scheduled.  Denies right upper quadrant pain.Denies heartburn, nausea, vomiting, epigastric pain, and dysphagia.  She does struggle with some constipation since switching to Mounjaro.  Denies melena and hematochezia. Denies RUQ pain, alcohol use, IV drug use, unprofessional tattoos, history of or exposure to hepatitis, history of blood transfusions, and family history of liver disease.     US liver 6/6/2024  1. Hepatic steatosis, likely moderate severity.  2. Cholecystectomy. Dilated common bile duct up to 1.8 cm only minimally changed since 10/12/2021 comparison. Chronicity favors post cholecystectomy related ectasia however the setting of elevated bilirubin differential could include pathology at the   ampulla such as stricture or lesion versus biliary type sphincter of Oddi dysfunction. Consider MRCP without and with IV contrast for further assessment.    Colonoscopy 4/27/2023 by Dr. Jack-normal mucosa throughout colon with grade 1 internal hemorrhoids.  Repeat colonoscopy in 5 years due to family history of colon cancer and personal history of colon polyps    Past Medical History:   Diagnosis Date    Condition not found     Hernia    Diabetes mellitus     BG RUNS AROUND  IN AM    Foot pain, right 08/02/2018    Fracture of 5th metatarsal 08/02/2018    Gall stones     Heel pain     Hot flashes 04/10/2014    Impaired fasting glucose 05/11/2018    Joint pain 02/27/2014    Low back pain     Lumbar pain with radiation down left leg 04/01/2015    Macromastia     Migraine headache     Night sweats     Obesity 05/11/2018    Osteoporosis  2014    Pain in thoracic spine 04/10/2014    Sinus trouble     Vitamin D deficiency 04/10/2014       Past Surgical History:   Procedure Laterality Date    BILATERAL BREAST REDUCTION Bilateral 2022    Procedure: BREAST REDUCTION BILATERAL;  Surgeon: Daniele Walker MD;  [place] [facility] [place] [facility] [place]  Service: Plastics;  Laterality: Bilateral;    BILATERAL BREAST REDUCTION Bilateral 2023    Procedure: Bilateral breast scar revision.;  Surgeon: Daniele Walker MD;  Location: [facility] [place] [facility] [place];  Service: Plastics;  Laterality: Bilateral;     SECTION      CHOLECYSTECTOMY      COLONOSCOPY  2016    LANI    COLONOSCOPY N/A 2023    Procedure: COLONOSCOPY;  Surgeon: Emiliano Jack MD;  Location: [facility] ENDOSCOPY;  Service: Gastroenterology;  Laterality: N/A;  HEMORRHOIDS    ENDOSCOPY  2016    HYSTERECTOMY      INCONTINENCE SURGERY      ROTATOR CUFF REPAIR      right    VENTRAL HERNIA REPAIR           Current Outpatient Medications:     acyclovir (Zovirax) 5 % ointment, Apply 1 Application topically to the appropriate area as directed Every 3 (Three) Hours., Disp: 5 g, Rfl: 0    atorvastatin (LIPITOR) 10 MG tablet, TAKE 1 TABLET BY MOUTH DAILY, Disp: 90 tablet, Rfl: 1    calamine 8-8 % lotion lotion, Apply  topically to the appropriate area as directed Every 8 (Eight) Hours As Needed., Disp: , Rfl:     diclofenac (VOLTAREN) 75 MG EC tablet, TAKE 1 TABLET BY MOUTH TWICE DAILY (Patient taking differently: 1 tablet Daily As Needed.), Disp: 180 tablet, Rfl: 1    Diclofenac Sodium (VOLTAREN) 1 % gel gel, Apply 4 g topically to the appropriate area as directed 4 (Four) Times a Day As Needed (pain)., Disp: 150 g, Rfl: 2    fluticasone (FLONASE) 50 MCG/ACT nasal spray, SHAKE LIQUID AND USE 2 SPRAYS IN EACH NOSTRIL EVERY DAY, Disp: 48 g, Rfl: 1    hydrocortisone 1 % lotion, Apply  topically to the appropriate area as directed 2 (Two) Times a Day., Disp: , Rfl:     lisinopril  "(PRINIVIL,ZESTRIL) 5 MG tablet, Take 1 tablet by mouth Daily., Disp: 90 tablet, Rfl: 1    metFORMIN (GLUCOPHAGE) 1000 MG tablet, Take 1 tablet by mouth 2 (Two) Times a Day With Meals., Disp: 180 tablet, Rfl: 1    multivitamin with minerals (HAIR SKIN & NAILS ADVANCED PO), Take 1 tablet by mouth Daily., Disp: , Rfl:     Tirzepatide (MOUNJARO) 2.5 MG/0.5ML solution pen-injector pen, Inject 0.5 mL under the skin into the appropriate area as directed 1 (One) Time Per Week., Disp: 2 mL, Rfl: 1    vitamin D (ERGOCALCIFEROL) 1.25 MG (29827 UT) capsule capsule, TAKE 1 CAPSULE BY MOUTH EVERY 7 DAYS, Disp: 13 capsule, Rfl: 1    methylPREDNISolone (MEDROL) 4 MG dose pack, Take as directed on package instructions. (Patient not taking: Reported on 2024), Disp: 21 each, Rfl: 0     Allergies   Allergen Reactions    Gabapentin Mental Status Change       Family History   Problem Relation Age of Onset    Breast cancer Mother     Diabetes Mother     Liver cancer Father     Lung cancer Father     Breast cancer Sister     Diabetes Sister     Colon cancer Paternal Aunt 67         age not listed    Diabetes Paternal Aunt     Colon cancer Cousin 55    Malig Hyperthermia Neg Hx         Social History     Social History Narrative    Not on file       Objective       Vital Signs:   /61 (BP Location: Left arm, Patient Position: Sitting, Cuff Size: Adult)   Pulse 71   Ht 152.4 cm (60\")   Wt 83.1 kg (183 lb 1.6 oz)   SpO2 100%   BMI 35.76 kg/m²       Physical Exam  Constitutional:       Appearance: Normal appearance. She is normal weight.   HENT:      Head: Normocephalic and atraumatic.      Nose: Nose normal.   Pulmonary:      Effort: Pulmonary effort is normal.   Skin:     General: Skin is warm and dry.   Neurological:      Mental Status: She is alert and oriented to person, place, and time. Mental status is at baseline.   Psychiatric:         Mood and Affect: Mood normal.         Behavior: Behavior normal.         " Thought Content: Thought content normal.         Judgment: Judgment normal.         Result Review :                       Assessment and Plan    Diagnoses and all orders for this visit:    1. Elevated liver enzymes (Primary)  -     AFP Tumor Marker; Future  -     Alpha - 1 - Antitrypsin; Future  -     ROSEY; Future  -     Hepatic Function Panel; Future  -     Hepatitis Panel, Acute; Future  -     Iron Profile; Future  -     Ferritin; Future  -     Copper, Serum; Future  -     Protime-INR; Future  -     Ceruloplasmin; Future  -     Mitochondrial Antibodies, M2; Future  -     Anti-Smooth Muscle Antibody Titer; Future  -     Immunofixation, Serum; Future    2. Fatty liver  -     AFP Tumor Marker; Future  -     Alpha - 1 - Antitrypsin; Future  -     ROSEY; Future  -     Hepatic Function Panel; Future  -     Hepatitis Panel, Acute; Future  -     Iron Profile; Future  -     Ferritin; Future  -     Copper, Serum; Future  -     Protime-INR; Future  -     Ceruloplasmin; Future  -     Mitochondrial Antibodies, M2; Future  -     Anti-Smooth Muscle Antibody Titer; Future  -     Immunofixation, Serum; Future    3. Elevated bilirubin    4. Common bile duct dilation    63 year old patient presents to the office for elevated bilirubin and dilated common bile duct.  Order has been placed for MRI MRCP by PCP, not yet scheduled.  Patient prefers to have imaging completed at [place], we were able to call over and give her a date of June 24.  Discussed findings on MRI MRCP that would warrant further evaluation with an ERCP, educated patient on risk of this type of procedure.  Full liver workup ordered as well to ensure no underlying etiology.  Patient will follow-up in 4 months.  Patient is agreeable to plan call the office any questions or concerns.    Follow Up   Return in about 4 months (around 10/12/2024).  Patient was given instructions and counseling regarding her condition or for health maintenance advice. Please see specific  information pulled into the AVS if appropriate.

## 2024-06-13 LAB
ANA SER QL: NEGATIVE
IGA SERPL-MCNC: 67 MG/DL (ref 87–352)
IGG SERPL-MCNC: 770 MG/DL (ref 586–1602)
IGM SERPL-MCNC: 52 MG/DL (ref 26–217)
MITOCHONDRIA M2 IGG SER-ACNC: <20 UNITS (ref 0–20)
PROT PATTERN SERPL IFE-IMP: ABNORMAL
SMA IGG SER-ACNC: 4 UNITS (ref 0–19)

## 2024-06-14 ENCOUNTER — TELEPHONE (OUTPATIENT)
Dept: GASTROENTEROLOGY | Facility: CLINIC | Age: 64
End: 2024-06-14
Payer: MEDICAID

## 2024-06-14 LAB — COPPER SERPL-MCNC: 90 UG/DL (ref 80–158)

## 2024-06-14 NOTE — TELEPHONE ENCOUNTER
Pt returned call went over results and recommendations, pt verbalized understanding and agrees to have the MRI completed.

## 2024-06-14 NOTE — TELEPHONE ENCOUNTER
----- Message from Charity Laughlin sent at 6/14/2024  7:58 AM EDT -----  I have reviewed the patient's most recent liver work-up which is normal ruling out underlying hepatitis, autoimmune, and genetic cause for elevated liver enzymes. Total bilirubin remains mildly elevated, stable compared to previous labs. Continue previously discussed recommendations: weight loss of 10%, cutting back on carbs, sugars, and fried fatty foods, limiting intake of soda and sugary drinks, and abstain from alcohol.     Plan to proceed with MRI MRCP as discussed in office.

## 2024-06-24 ENCOUNTER — OFFICE VISIT (OUTPATIENT)
Dept: FAMILY MEDICINE CLINIC | Facility: CLINIC | Age: 64
End: 2024-06-24
Payer: MEDICAID

## 2024-06-24 VITALS
OXYGEN SATURATION: 98 % | WEIGHT: 182 LBS | HEIGHT: 60 IN | SYSTOLIC BLOOD PRESSURE: 132 MMHG | BODY MASS INDEX: 35.73 KG/M2 | HEART RATE: 76 BPM | DIASTOLIC BLOOD PRESSURE: 61 MMHG

## 2024-06-24 DIAGNOSIS — R21 RASH: Primary | ICD-10-CM

## 2024-06-24 PROCEDURE — 3078F DIAST BP <80 MM HG: CPT | Performed by: NURSE PRACTITIONER

## 2024-06-24 PROCEDURE — 1159F MED LIST DOCD IN RCRD: CPT | Performed by: NURSE PRACTITIONER

## 2024-06-24 PROCEDURE — 3075F SYST BP GE 130 - 139MM HG: CPT | Performed by: NURSE PRACTITIONER

## 2024-06-24 PROCEDURE — 99213 OFFICE O/P EST LOW 20 MIN: CPT | Performed by: NURSE PRACTITIONER

## 2024-06-24 PROCEDURE — 1160F RVW MEDS BY RX/DR IN RCRD: CPT | Performed by: NURSE PRACTITIONER

## 2024-06-24 PROCEDURE — 1125F AMNT PAIN NOTED PAIN PRSNT: CPT | Performed by: NURSE PRACTITIONER

## 2024-06-24 PROCEDURE — 3044F HG A1C LEVEL LT 7.0%: CPT | Performed by: NURSE PRACTITIONER

## 2024-06-24 RX ORDER — METHYLPREDNISOLONE 4 MG/1
TABLET ORAL
Qty: 1 EACH | Refills: 0 | Status: SHIPPED | OUTPATIENT
Start: 2024-06-24

## 2024-06-24 RX ORDER — CEPHALEXIN 500 MG/1
500 CAPSULE ORAL 4 TIMES DAILY
Qty: 40 CAPSULE | Refills: 0 | Status: SHIPPED | OUTPATIENT
Start: 2024-06-24 | End: 2024-07-04

## 2024-06-24 RX ORDER — CEPHALEXIN 500 MG/1
500 CAPSULE ORAL 3 TIMES DAILY
Qty: 30 CAPSULE | Refills: 0 | Status: SHIPPED | OUTPATIENT
Start: 2024-06-24 | End: 2024-06-24

## 2024-06-24 NOTE — ASSESSMENT & PLAN NOTE
Patient appears to have contact dermatitis, possible early/mild cellulitis starting.  Given patient's history we will go ahead and treat with Keflex and Medrol Dosepak, discussed with patient if she has any worsening symptoms whatsoever she is to seek immediate reevaluation

## 2024-06-24 NOTE — PROGRESS NOTES
Chief Complaint  Rash    SUBJECTIVE  Cassandra Maria presents to [facility]     Pt reports rash first started about a week or so ago, thought it was getting a little better until the day before yesterday she started to notice that it appeared to be a bit more swollen, and getting a bit more red and has some heat in it.     Patient states that it started as just poison ivy that she contacted from her , but she was concerned when it started to get a bit redder because she has had erysipelas in the past    Patient denies any flulike symptoms, no fever, body aches, chills, feels fine, just concerned about the rash    History of Present Illness  Past Medical History:   Diagnosis Date    Condition not found     Hernia    Diabetes mellitus     BG RUNS AROUND  IN AM    Foot pain, right 2018    Fracture of 5th metatarsal 2018    Gall stones     Heel pain     Hot flashes 04/10/2014    Impaired fasting glucose 2018    Joint pain 2014    Low back pain     Lumbar pain with radiation down left leg 2015    Macromastia     Migraine headache     Night sweats     Obesity 2018    Osteoporosis 2014    Pain in thoracic spine 04/10/2014    Sinus trouble     Vitamin D deficiency 04/10/2014      Family History   Problem Relation Age of Onset    Breast cancer Mother     Diabetes Mother     Liver cancer Father     Lung cancer Father     Breast cancer Sister     Diabetes Sister     Colon cancer Paternal Aunt 67         age not listed    Diabetes Paternal Aunt     Colon cancer Cousin 55    Malig Hyperthermia Neg Hx       Past Surgical History:   Procedure Laterality Date    BILATERAL BREAST REDUCTION Bilateral 2022    Procedure: BREAST REDUCTION BILATERAL;  Surgeon: Daniele Walker MD;  [place] [facility] [place] [facility] [place]  Service: Plastics;  Laterality: Bilateral;    BILATERAL BREAST REDUCTION Bilateral 2023    Procedure: Bilateral  breast scar revision.;  Surgeon: Daniele Walker MD;  [place] [facility] [place] [facility] [place];  Service: Plastics;  Laterality: Bilateral;     SECTION      CHOLECYSTECTOMY      COLONOSCOPY  2016    LANI    COLONOSCOPY N/A 2023    Procedure: COLONOSCOPY;  Surgeon: Emiliano Jack MD;  Location: [facility] ENDOSCOPY;  Service: Gastroenterology;  Laterality: N/A;  HEMORRHOIDS    ENDOSCOPY  2016    HYSTERECTOMY      INCONTINENCE SURGERY      ROTATOR CUFF REPAIR      right    VENTRAL HERNIA REPAIR          Current Outpatient Medications:     atorvastatin (LIPITOR) 10 MG tablet, TAKE 1 TABLET BY MOUTH DAILY, Disp: 90 tablet, Rfl: 1    calamine 8-8 % lotion lotion, Apply  topically to the appropriate area as directed Every 8 (Eight) Hours As Needed., Disp: , Rfl:     diclofenac (VOLTAREN) 75 MG EC tablet, TAKE 1 TABLET BY MOUTH TWICE DAILY (Patient taking differently: 1 tablet Daily As Needed.), Disp: 180 tablet, Rfl: 1    fluticasone (FLONASE) 50 MCG/ACT nasal spray, SHAKE LIQUID AND USE 2 SPRAYS IN EACH NOSTRIL EVERY DAY, Disp: 48 g, Rfl: 1    lisinopril (PRINIVIL,ZESTRIL) 5 MG tablet, Take 1 tablet by mouth Daily., Disp: 90 tablet, Rfl: 1    metFORMIN (GLUCOPHAGE) 1000 MG tablet, Take 1 tablet by mouth 2 (Two) Times a Day With Meals., Disp: 180 tablet, Rfl: 1    Tirzepatide (MOUNJARO) 2.5 MG/0.5ML solution pen-injector pen, Inject 0.5 mL under the skin into the appropriate area as directed 1 (One) Time Per Week., Disp: 2 mL, Rfl: 1    vitamin D (ERGOCALCIFEROL) 1.25 MG (42172 UT) capsule capsule, TAKE 1 CAPSULE BY MOUTH EVERY 7 DAYS, Disp: 13 capsule, Rfl: 1    acyclovir (Zovirax) 5 % ointment, Apply 1 Application topically to the appropriate area as directed Every 3 (Three) Hours. (Patient not taking: Reported on 2024), Disp: 5 g, Rfl: 0    cephalexin (Keflex) 500 MG capsule, Take 1 capsule by mouth 4 (Four) Times a Day for 10 days., Disp: 40 capsule, Rfl: 0    Diclofenac Sodium (VOLTAREN)  "1 % gel gel, Apply 4 g topically to the appropriate area as directed 4 (Four) Times a Day As Needed (pain). (Patient not taking: Reported on 6/24/2024), Disp: 150 g, Rfl: 2    hydrocortisone 1 % lotion, Apply  topically to the appropriate area as directed 2 (Two) Times a Day. (Patient not taking: Reported on 6/24/2024), Disp: , Rfl:     methylPREDNISolone (MEDROL) 4 MG dose pack, Take as directed on package instructions., Disp: 1 each, Rfl: 0    multivitamin with minerals (HAIR SKIN & NAILS ADVANCED PO), Take 1 tablet by mouth Daily. (Patient not taking: Reported on 6/24/2024), Disp: , Rfl:     OBJECTIVE  Vital Signs:   /61   Pulse 76   Ht 152.4 cm (60\")   Wt 82.6 kg (182 lb)   SpO2 98%   BMI 35.54 kg/m²    Estimated body mass index is 35.54 kg/m² as calculated from the following:    Height as of this encounter: 152.4 cm (60\").    Weight as of this encounter: 82.6 kg (182 lb).     Wt Readings from Last 3 Encounters:   06/24/24 82.6 kg (182 lb)   06/12/24 83.1 kg (183 lb 1.6 oz)   02/29/24 86.2 kg (190 lb)     BP Readings from Last 3 Encounters:   06/24/24 132/61   06/12/24 140/61   02/29/24 119/78       Physical Exam  Vitals reviewed.   Constitutional:       Appearance: Normal appearance. She is well-developed.   HENT:      Head: Normocephalic and atraumatic.      Right Ear: External ear normal.      Left Ear: External ear normal.   Eyes:      Conjunctiva/sclera: Conjunctivae normal.      Pupils: Pupils are equal, round, and reactive to light.   Cardiovascular:      Rate and Rhythm: Normal rate and regular rhythm.      Heart sounds: No murmur heard.     No friction rub. No gallop.   Pulmonary:      Effort: Pulmonary effort is normal.      Breath sounds: Normal breath sounds. No wheezing or rhonchi.   Skin:     General: Skin is warm and dry.      Comments: On bilateral forearms patient noted to have papular rash, a few very mild excoriations noted from scratching, there does appear to be some generalized " surrounding mild erythema and very mild warmth.   Neurological:      Mental Status: She is alert and oriented to person, place, and time.      Cranial Nerves: No cranial nerve deficit.   Psychiatric:         Mood and Affect: Mood and affect normal.         Behavior: Behavior normal.         Thought Content: Thought content normal.         Judgment: Judgment normal.          Result Review    CMP          2/7/2024    11:38 5/6/2024    13:43 6/12/2024    09:06   CMP   Glucose 90      BUN 13      Creatinine 0.77      EGFR 86.8      Sodium 140      Potassium 4.1      Chloride 104      Calcium 9.6      Total Protein 7.0  6.5  6.8    Albumin 4.4  4.4  4.3    Globulin 2.6      Total Bilirubin 1.9  1.4  1.4    Alkaline Phosphatase 56  44  51    AST (SGOT) 19  19  21    ALT (SGPT) 26  20  19    Albumin/Globulin Ratio 1.7      BUN/Creatinine Ratio 16.9      Anion Gap 12.5        CBC          8/8/2023    09:21   CBC   WBC 5.16    RBC 4.62    Hemoglobin 14.1    Hematocrit 42.0    MCV 90.9    MCH 30.5    MCHC 33.6    RDW 11.8    Platelets 280        US Liver    Result Date: 6/6/2024  1. Hepatic steatosis, likely moderate severity. 2. Cholecystectomy. Dilated common bile duct up to 1.8 cm only minimally changed since 10/12/2021 comparison. Chronicity favors post cholecystectomy related ectasia however the setting of elevated bilirubin differential could include pathology at the ampulla such as stricture or lesion versus biliary type sphincter of Oddi dysfunction. Consider MRCP without and with IV contrast for further assessment. Electronically Signed: Brayan Del Real MD  6/6/2024 1:32 PM EDT  Workstation ID: [id number]    Mammo Screening Digital Tomosynthesis Bilateral With CAD    Result Date: 4/1/2024  No mammographic signs of malignancy.  Recommend annual screening mammography  TISSUE DENSITY:  The breasts are almost entirely fatty.  BI-RADS ASSESSMENT: BI-RADS 2. Benign findings.   Note: It has been reported that there is  approximately a 15% false negative in mammography. Therefore, management of a palpable abnormality should not be deferred because of a negative mammogram.           Electronically Signed By-Tj Edouard MD On:4/1/2024 9:12 AM         The above data has been reviewed by GENESIS Gates 06/24/2024 09:53 EDT.          Patient Care Team:  Marquita Maat APRN as PCP - General (Nurse Practitioner)  Daniele Walker MD as Consulting Physician (Plastic Surgery)  Tova Childs APRN as Nurse Practitioner (Plastic Surgery)  Charity Laughlin APRN as Nurse Practitioner (Gastroenterology)           ASSESSMENT & PLAN    Diagnoses and all orders for this visit:    1. Rash (Primary)  Assessment & Plan:  Patient appears to have contact dermatitis, possible early/mild cellulitis starting.  Given patient's history we will go ahead and treat with Keflex and Medrol Dosepak, discussed with patient if she has any worsening symptoms whatsoever she is to seek immediate reevaluation      Other orders  -     methylPREDNISolone (MEDROL) 4 MG dose pack; Take as directed on package instructions.  Dispense: 1 each; Refill: 0  -     Discontinue: cephalexin (Keflex) 500 MG capsule; Take 1 capsule by mouth 3 (Three) Times a Day for 10 days.  Dispense: 30 capsule; Refill: 0  -     cephalexin (Keflex) 500 MG capsule; Take 1 capsule by mouth 4 (Four) Times a Day for 10 days.  Dispense: 40 capsule; Refill: 0         Tobacco Use: Low Risk  (6/24/2024)    Patient History     Smoking Tobacco Use: Never     Smokeless Tobacco Use: Never     Passive Exposure: Never       Follow Up     Return if symptoms worsen or fail to improve.        Patient was given instructions and counseling regarding her condition or for health maintenance advice. Please see specific information pulled into the AVS if appropriate.   I have reviewed information obtained and documented by others and I have confirmed the accuracy of this documented  note.    Marquita Mata, APRN

## 2024-06-25 ENCOUNTER — TELEPHONE (OUTPATIENT)
Dept: GASTROENTEROLOGY | Facility: CLINIC | Age: 64
End: 2024-06-25
Payer: MEDICAID

## 2024-06-25 NOTE — TELEPHONE ENCOUNTER
Hub staff attempted to follow warm transfer process and was unsuccessful     Caller: [facility]    Relationship to patient:     Best call back number: [phone]    Patient is needing: CALLING TO ADVISE THAT PATIENT MRI/MRCP HAS BEEN RESCHEDULED TO 7-10-24

## 2024-07-05 ENCOUNTER — TELEPHONE (OUTPATIENT)
Dept: FAMILY MEDICINE CLINIC | Facility: CLINIC | Age: 64
End: 2024-07-05
Payer: MEDICAID

## 2024-07-09 ENCOUNTER — TELEPHONE (OUTPATIENT)
Dept: FAMILY MEDICINE CLINIC | Facility: CLINIC | Age: 64
End: 2024-07-09

## 2024-07-09 NOTE — TELEPHONE ENCOUNTER
[facility] called office requesting to speak with referrals for AUTH FOR MRI ABDOMEN. Requesting a call back from referrals ASAP due to patient having imaging done tomorrow 07/10/2024.

## 2024-07-23 RX ORDER — TIRZEPATIDE 5 MG/.5ML
INJECTION, SOLUTION SUBCUTANEOUS
Qty: 2 ML | Refills: 1 | Status: SHIPPED | OUTPATIENT
Start: 2024-07-23

## 2024-07-26 DIAGNOSIS — R17 ELEVATED BILIRUBIN: Primary | ICD-10-CM

## 2024-07-31 ENCOUNTER — TELEPHONE (OUTPATIENT)
Dept: GASTROENTEROLOGY | Facility: CLINIC | Age: 64
End: 2024-07-31
Payer: MEDICAID

## 2024-07-31 NOTE — TELEPHONE ENCOUNTER
----- Message from Charity Laughlin sent at 7/26/2024  4:13 PM EDT -----  I have reviewed the patient's most recent MRI MRCP which shows dilation in bile duct but no evidence of a stricture of stone. Recommend repeating liver enzymes, if bilirubin still elevated may consider EUS

## 2024-07-31 NOTE — TELEPHONE ENCOUNTER
S/w patient  Aware of results  Patient aware to repeat labs   Will call with any questions or concerns

## 2024-08-01 DIAGNOSIS — I10 HYPERTENSION, UNSPECIFIED TYPE: ICD-10-CM

## 2024-08-01 DIAGNOSIS — E11.9 TYPE 2 DIABETES MELLITUS WITHOUT COMPLICATION, WITHOUT LONG-TERM CURRENT USE OF INSULIN: ICD-10-CM

## 2024-08-01 RX ORDER — LISINOPRIL 5 MG/1
5 TABLET ORAL DAILY
Qty: 90 TABLET | Refills: 0 | Status: SHIPPED | OUTPATIENT
Start: 2024-08-01

## 2024-08-12 ENCOUNTER — LAB (OUTPATIENT)
Dept: LAB | Facility: HOSPITAL | Age: 64
End: 2024-08-12
Payer: MEDICAID

## 2024-08-12 DIAGNOSIS — R17 ELEVATED BILIRUBIN: ICD-10-CM

## 2024-08-12 LAB
ALBUMIN SERPL-MCNC: 4.4 G/DL (ref 3.5–5.2)
ALP SERPL-CCNC: 60 U/L (ref 39–117)
ALT SERPL W P-5'-P-CCNC: 21 U/L (ref 1–33)
AST SERPL-CCNC: 19 U/L (ref 1–32)
BILIRUB CONJ SERPL-MCNC: 0.3 MG/DL (ref 0–0.3)
BILIRUB INDIRECT SERPL-MCNC: 1.2 MG/DL
BILIRUB SERPL-MCNC: 1.5 MG/DL (ref 0–1.2)
PROT SERPL-MCNC: 7 G/DL (ref 6–8.5)

## 2024-08-12 PROCEDURE — 80076 HEPATIC FUNCTION PANEL: CPT

## 2024-08-12 PROCEDURE — 36415 COLL VENOUS BLD VENIPUNCTURE: CPT

## 2024-08-14 ENCOUNTER — TELEPHONE (OUTPATIENT)
Dept: GASTROENTEROLOGY | Facility: CLINIC | Age: 64
End: 2024-08-14
Payer: MEDICAID

## 2024-08-14 DIAGNOSIS — K83.8 COMMON BILE DUCT DILATION: Primary | ICD-10-CM

## 2024-08-14 DIAGNOSIS — R17 ELEVATED BILIRUBIN: ICD-10-CM

## 2024-08-14 NOTE — TELEPHONE ENCOUNTER
----- Message from Charity Laughlin sent at 8/13/2024 10:08 AM EDT -----  Bilirubin remains elevated. Recommend proceeding with EUS for further evaluation please see if patient is agreeable.

## 2024-09-16 RX ORDER — TIRZEPATIDE 5 MG/.5ML
INJECTION, SOLUTION SUBCUTANEOUS
Qty: 2 ML | Refills: 0 | Status: SHIPPED | OUTPATIENT
Start: 2024-09-16 | End: 2024-09-17

## 2024-09-17 RX ORDER — TIRZEPATIDE 5 MG/.5ML
INJECTION, SOLUTION SUBCUTANEOUS
Qty: 2 ML | Refills: 0 | Status: SHIPPED | OUTPATIENT
Start: 2024-09-17

## 2024-10-02 ENCOUNTER — TELEPHONE (OUTPATIENT)
Dept: GASTROENTEROLOGY | Facility: CLINIC | Age: 64
End: 2024-10-02
Payer: MEDICAID

## 2024-10-02 RX ORDER — FLUTICASONE PROPIONATE 50 UG/1
2 SPRAY, METERED NASAL DAILY
Qty: 48 G | Refills: 1 | Status: SHIPPED | OUTPATIENT
Start: 2024-10-02

## 2024-10-02 NOTE — TELEPHONE ENCOUNTER
Left voicemail for patient to contact the office to discuss referral for EUS.      Referral has been triaged and is ready to schedule with Dr Rodríguez.  Patient can contact their scheduling team at [phone] to schedule.

## 2024-10-02 NOTE — TELEPHONE ENCOUNTER
PATIENT RETURNED CALL AND WAS PROVIDED WITH THE PHONE NUMBER TO CALL AND SCHEDULE EUS WITH DR CURRY

## 2024-10-14 ENCOUNTER — TELEPHONE (OUTPATIENT)
Dept: GASTROENTEROLOGY | Facility: CLINIC | Age: 64
End: 2024-10-14
Payer: MEDICAID

## 2024-10-14 NOTE — TELEPHONE ENCOUNTER
Received call from Yna Trivedi returning call to [facility].   Advised caller [facility] request the date and time for patients EUS.   -Caller states they have not received an order for the patient and would need that faxed to them @ [phone]  She states the order would then need to be reviewed and scheduled by the Nurse Liaison and they will reach out to patient to schedule.

## 2024-10-14 NOTE — TELEPHONE ENCOUNTER
Madie olvera/radha Lock at Dr. Rodríguez's office ([phone])  Orders has been faxed to Endo Scheduling but patient has not been scheduled yet, she is going to e mail ENDO scheduling   Will follow up 10/18/2024

## 2024-10-15 ENCOUNTER — OFFICE VISIT (OUTPATIENT)
Dept: FAMILY MEDICINE CLINIC | Facility: CLINIC | Age: 64
End: 2024-10-15
Payer: MEDICAID

## 2024-10-15 VITALS
BODY MASS INDEX: 34.75 KG/M2 | HEIGHT: 60 IN | DIASTOLIC BLOOD PRESSURE: 62 MMHG | SYSTOLIC BLOOD PRESSURE: 128 MMHG | WEIGHT: 177 LBS | HEART RATE: 71 BPM | OXYGEN SATURATION: 99 %

## 2024-10-15 DIAGNOSIS — Z12.31 ENCOUNTER FOR SCREENING MAMMOGRAM FOR MALIGNANT NEOPLASM OF BREAST: ICD-10-CM

## 2024-10-15 DIAGNOSIS — E11.9 TYPE 2 DIABETES MELLITUS WITHOUT COMPLICATION, WITHOUT LONG-TERM CURRENT USE OF INSULIN: ICD-10-CM

## 2024-10-15 DIAGNOSIS — E78.5 HYPERLIPIDEMIA, UNSPECIFIED HYPERLIPIDEMIA TYPE: ICD-10-CM

## 2024-10-15 DIAGNOSIS — E66.811 CLASS 1 OBESITY WITH SERIOUS COMORBIDITY AND BODY MASS INDEX (BMI) OF 34.0 TO 34.9 IN ADULT, UNSPECIFIED OBESITY TYPE: ICD-10-CM

## 2024-10-15 DIAGNOSIS — I10 HYPERTENSION, UNSPECIFIED TYPE: ICD-10-CM

## 2024-10-15 DIAGNOSIS — E55.9 VITAMIN D DEFICIENCY: ICD-10-CM

## 2024-10-15 DIAGNOSIS — Z23 NEED FOR IMMUNIZATION AGAINST INFLUENZA: Primary | ICD-10-CM

## 2024-10-15 PROCEDURE — 3044F HG A1C LEVEL LT 7.0%: CPT | Performed by: NURSE PRACTITIONER

## 2024-10-15 PROCEDURE — 90656 IIV3 VACC NO PRSV 0.5 ML IM: CPT | Performed by: NURSE PRACTITIONER

## 2024-10-15 PROCEDURE — 1160F RVW MEDS BY RX/DR IN RCRD: CPT | Performed by: NURSE PRACTITIONER

## 2024-10-15 PROCEDURE — 3078F DIAST BP <80 MM HG: CPT | Performed by: NURSE PRACTITIONER

## 2024-10-15 PROCEDURE — 99214 OFFICE O/P EST MOD 30 MIN: CPT | Performed by: NURSE PRACTITIONER

## 2024-10-15 PROCEDURE — 90471 IMMUNIZATION ADMIN: CPT | Performed by: NURSE PRACTITIONER

## 2024-10-15 PROCEDURE — 1159F MED LIST DOCD IN RCRD: CPT | Performed by: NURSE PRACTITIONER

## 2024-10-15 PROCEDURE — 3074F SYST BP LT 130 MM HG: CPT | Performed by: NURSE PRACTITIONER

## 2024-10-15 PROCEDURE — 1125F AMNT PAIN NOTED PAIN PRSNT: CPT | Performed by: NURSE PRACTITIONER

## 2024-10-15 RX ORDER — ERGOCALCIFEROL 1.25 MG/1
50000 CAPSULE, LIQUID FILLED ORAL
Qty: 13 CAPSULE | Refills: 1 | Status: SHIPPED | OUTPATIENT
Start: 2024-10-15

## 2024-10-15 RX ORDER — ATORVASTATIN CALCIUM 10 MG/1
10 TABLET, FILM COATED ORAL DAILY
Qty: 90 TABLET | Refills: 1 | Status: SHIPPED | OUTPATIENT
Start: 2024-10-15

## 2024-10-15 RX ORDER — LISINOPRIL 5 MG/1
5 TABLET ORAL DAILY
Qty: 90 TABLET | Refills: 1 | Status: SHIPPED | OUTPATIENT
Start: 2024-10-15

## 2024-10-15 NOTE — ASSESSMENT & PLAN NOTE
Patient's (Body mass index is 34.57 kg/m².) indicates that they are obese (BMI >30) with health conditions that include hypertension and diabetes mellitus . Weight is improving with lifestyle modifications. BMI  is above average; BMI management plan is completed. We discussed portion control and increasing exercise.

## 2024-10-15 NOTE — PROGRESS NOTES
Chief Complaint  Hyperlipidemia, Hypertension, Diabetes, and Vitamin D Deficiency    SUBJECTIVE  Cassandra Maria presents to [facility] for six month follow up on Hyperlipidemia, Hypertension, Diabetes, and Vitamin D Deficiency.        History of Present Illness  Past Medical History:   Diagnosis Date    Condition not found     Hernia    Diabetes mellitus     BG RUNS AROUND  IN AM    Foot pain, right 2018    Fracture of 5th metatarsal 2018    Gall stones     Heel pain     Hot flashes 04/10/2014    Impaired fasting glucose 2018    Joint pain 2014    Low back pain     Lumbar pain with radiation down left leg 2015    Macromastia     Migraine headache     Night sweats     Obesity 2018    Osteoporosis 2014    Pain in thoracic spine 04/10/2014    Sinus trouble     Vitamin D deficiency 04/10/2014      Family History   Problem Relation Age of Onset    Breast cancer Mother     Diabetes Mother     Liver cancer Father     Lung cancer Father     Breast cancer Sister     Diabetes Sister     Colon cancer Paternal Aunt 67         age not listed    Diabetes Paternal Aunt     Colon cancer Cousin 55    Malig Hyperthermia Neg Hx       Past Surgical History:   Procedure Laterality Date    BILATERAL BREAST REDUCTION Bilateral 2022    Procedure: BREAST REDUCTION BILATERAL;  Surgeon: Daniele Walker MD;  Location: [facility] OR [facility];  Service: Plastics;  Laterality: Bilateral;    BILATERAL BREAST REDUCTION Bilateral 2023    Procedure: Bilateral breast scar revision.;  Surgeon: Daniele Walker MD;  Location: [facility] OR [facility];  Service: Plastics;  Laterality: Bilateral;     SECTION      CHOLECYSTECTOMY      COLONOSCOPY  2016    LANI    COLONOSCOPY N/A 2023    Procedure: COLONOSCOPY;  Surgeon: Emiliano Jack MD;  Location: [facility] ENDOSCOPY;  Service: Gastroenterology;  Laterality: N/A;  HEMORRHOIDS     "ENDOSCOPY  2016    2002    HYSTERECTOMY      INCONTINENCE SURGERY      ROTATOR CUFF REPAIR      right    VENTRAL HERNIA REPAIR          Current Outpatient Medications:     acyclovir (Zovirax) 5 % ointment, Apply 1 Application topically to the appropriate area as directed Every 3 (Three) Hours., Disp: 5 g, Rfl: 0    atorvastatin (LIPITOR) 10 MG tablet, Take 1 tablet by mouth Daily., Disp: 90 tablet, Rfl: 1    calamine 8-8 % lotion lotion, Apply  topically to the appropriate area as directed Every 8 (Eight) Hours As Needed., Disp: , Rfl:     diclofenac (VOLTAREN) 75 MG EC tablet, TAKE 1 TABLET BY MOUTH TWICE DAILY (Patient taking differently: 1 tablet Daily As Needed.), Disp: 180 tablet, Rfl: 1    Diclofenac Sodium (VOLTAREN) 1 % gel gel, Apply 4 g topically to the appropriate area as directed 4 (Four) Times a Day As Needed (pain)., Disp: 150 g, Rfl: 2    fluticasone (FLONASE) 50 MCG/ACT nasal spray, SHAKE LIQUID AND USE 2 SPRAYS IN EACH NOSTRIL EVERY DAY, Disp: 48 g, Rfl: 1    hydrocortisone 1 % lotion, Apply  topically to the appropriate area as directed 2 (Two) Times a Day., Disp: , Rfl:     lisinopril (PRINIVIL,ZESTRIL) 5 MG tablet, Take 1 tablet by mouth Daily., Disp: 90 tablet, Rfl: 1    metFORMIN (GLUCOPHAGE) 1000 MG tablet, Take 1 tablet by mouth 2 (Two) Times a Day With Meals., Disp: 180 tablet, Rfl: 1    multivitamin with minerals (HAIR SKIN & NAILS ADVANCED PO), Take 1 tablet by mouth Daily., Disp: , Rfl:     Tirzepatide (Mounjaro) 5 MG/0.5ML solution pen-injector pen, Inject 0.5 mL under the skin into the appropriate area as directed 1 (One) Time Per Week., Disp: 2 mL, Rfl: 2    vitamin D (ERGOCALCIFEROL) 1.25 MG (21956 UT) capsule capsule, Take 1 capsule by mouth Every 7 (Seven) Days., Disp: 13 capsule, Rfl: 1    OBJECTIVE  Vital Signs:   /62   Pulse 71   Ht 152.4 cm (60\")   Wt 80.3 kg (177 lb)   SpO2 99%   BMI 34.57 kg/m²    Estimated body mass index is 34.57 kg/m² as calculated from the " "following:    Height as of this encounter: 152.4 cm (60\").    Weight as of this encounter: 80.3 kg (177 lb).     Wt Readings from Last 3 Encounters:   10/15/24 80.3 kg (177 lb)   06/24/24 82.6 kg (182 lb)   06/12/24 83.1 kg (183 lb 1.6 oz)     BP Readings from Last 3 Encounters:   10/15/24 128/62   06/24/24 132/61   06/12/24 140/61       Physical Exam  Vitals reviewed.   Constitutional:       Appearance: Normal appearance. She is well-developed.   HENT:      Head: Normocephalic and atraumatic.      Right Ear: External ear normal.      Left Ear: External ear normal.   Eyes:      Conjunctiva/sclera: Conjunctivae normal.      Pupils: Pupils are equal, round, and reactive to light.   Cardiovascular:      Rate and Rhythm: Normal rate and regular rhythm.      Heart sounds: No murmur heard.     No friction rub. No gallop.   Pulmonary:      Effort: Pulmonary effort is normal.      Breath sounds: Normal breath sounds. No wheezing or rhonchi.   Skin:     General: Skin is warm and dry.   Neurological:      Mental Status: She is alert and oriented to person, place, and time.      Cranial Nerves: No cranial nerve deficit.   Psychiatric:         Mood and Affect: Mood and affect normal.         Behavior: Behavior normal.         Thought Content: Thought content normal.         Judgment: Judgment normal.          Result Review    CMP          5/6/2024    13:43 6/12/2024    09:06 8/12/2024    13:02   CMP   Total Protein 6.5  6.8  7.0    Albumin 4.4  4.3  4.4    Total Bilirubin 1.4  1.4  1.5    Alkaline Phosphatase 44  51  60    AST (SGOT) 19  21  19    ALT (SGPT) 20  19  21        Lipid Panel          2/7/2024    11:38   Lipid Panel   Total Cholesterol 159    Triglycerides 85    HDL Cholesterol 65    VLDL Cholesterol 16    LDL Cholesterol  78    LDL/HDL Ratio 1.18      TSH          2/7/2024    11:38   TSH   TSH 1.900      Most Recent A1C          2/7/2024    11:38   HGBA1C Most Recent   Hemoglobin A1C 5.00        A1C Last 3 Results  "         2/7/2024    11:38   HGBA1C Last 3 Results   Hemoglobin A1C 5.00      Lab Results   Component Value Date    IIYQ28YW 57.1 01/30/2023        Lab Results   Component Value Date    FREET4 1.25 02/07/2024     Lab Results   Component Value Date    TAQFADTB55 796 05/28/2019       No Images in the past 120 days found..     The above data has been reviewed by GENESIS Gates 10/15/2024 11:44 EDT.          Patient Care Team:  Marquita Mata APRN as PCP - General (Nurse Practitioner)  Daniele Walker MD as Consulting Physician (Plastic Surgery)  Tova Childs APRN as Nurse Practitioner (Plastic Surgery)  Charity Laughlin APRN as Nurse Practitioner (Gastroenterology)      ASSESSMENT & PLAN    Diagnoses and all orders for this visit:    1. Need for immunization against influenza (Primary)  -     Fluzone >6mos ([id number])    2. Hyperlipidemia, unspecified hyperlipidemia type  Overview:  Stable on Lipitor, continue current medication    Orders:  -     atorvastatin (LIPITOR) 10 MG tablet; Take 1 tablet by mouth Daily.  Dispense: 90 tablet; Refill: 1    3. Hypertension, unspecified type  Overview:  Well-controlled, continue lisinopril    Orders:  -     lisinopril (PRINIVIL,ZESTRIL) 5 MG tablet; Take 1 tablet by mouth Daily.  Dispense: 90 tablet; Refill: 1    4. Type 2 diabetes mellitus without complication, without long-term current use of insulin  Assessment & Plan:  Patient is doing very well with Mounjaro, continue current medication, A1c very well-controlled    Orders:  -     Comprehensive Metabolic Panel; Future  -     Lipid Panel; Future  -     Hemoglobin A1c; Future  -     Microalbumin / Creatinine Urine Ratio - Urine, Clean Catch; Future  -     CBC w AUTO Differential; Future  -     metFORMIN (GLUCOPHAGE) 1000 MG tablet; Take 1 tablet by mouth 2 (Two) Times a Day With Meals.  Dispense: 180 tablet; Refill: 1  -     Tirzepatide (Mounjaro) 5 MG/0.5ML solution pen-injector pen; Inject 0.5 mL  under the skin into the appropriate area as directed 1 (One) Time Per Week.  Dispense: 2 mL; Refill: 2    5. Vitamin D deficiency  -     vitamin D (ERGOCALCIFEROL) 1.25 MG (06356 UT) capsule capsule; Take 1 capsule by mouth Every 7 (Seven) Days.  Dispense: 13 capsule; Refill: 1    6. Encounter for screening mammogram for malignant neoplasm of breast  -     Mammo Screening Digital Tomosynthesis Bilateral With CAD; Future    7. Class 1 obesity with serious comorbidity and body mass index (BMI) of 34.0 to 34.9 in adult, unspecified obesity type  Assessment & Plan:  Patient's (Body mass index is 34.57 kg/m².) indicates that they are obese (BMI >30) with health conditions that include hypertension and diabetes mellitus . Weight is improving with lifestyle modifications. BMI  is above average; BMI management plan is completed. We discussed portion control and increasing exercise.            Tobacco Use: Low Risk  (10/15/2024)    Patient History     Smoking Tobacco Use: Never     Smokeless Tobacco Use: Never     Passive Exposure: Never       Follow Up     Return in about 6 months (around 4/15/2025), or if symptoms worsen or fail to improve.        Patient was given instructions and counseling regarding her condition or for health maintenance advice. Please see specific information pulled into the AVS if appropriate.   I have reviewed information obtained and documented by others and I have confirmed the accuracy of this documented note.    GENESIS Gates

## 2024-10-16 ENCOUNTER — LAB (OUTPATIENT)
Dept: LAB | Facility: HOSPITAL | Age: 64
End: 2024-10-16
Payer: MEDICAID

## 2024-10-16 DIAGNOSIS — E11.9 TYPE 2 DIABETES MELLITUS WITHOUT COMPLICATION, WITHOUT LONG-TERM CURRENT USE OF INSULIN: ICD-10-CM

## 2024-10-16 LAB
ALBUMIN SERPL-MCNC: 4.2 G/DL (ref 3.5–5.2)
ALBUMIN UR-MCNC: <1.2 MG/DL
ALBUMIN/GLOB SERPL: 1.6 G/DL
ALP SERPL-CCNC: 54 U/L (ref 39–117)
ALT SERPL W P-5'-P-CCNC: 20 U/L (ref 1–33)
ANION GAP SERPL CALCULATED.3IONS-SCNC: 8.7 MMOL/L (ref 5–15)
AST SERPL-CCNC: 19 U/L (ref 1–32)
BASOPHILS # BLD AUTO: 0.04 10*3/MM3 (ref 0–0.2)
BASOPHILS NFR BLD AUTO: 0.7 % (ref 0–1.5)
BILIRUB SERPL-MCNC: 1.5 MG/DL (ref 0–1.2)
BUN SERPL-MCNC: 9 MG/DL (ref 8–23)
BUN/CREAT SERPL: 10.6 (ref 7–25)
CALCIUM SPEC-SCNC: 10 MG/DL (ref 8.6–10.5)
CHLORIDE SERPL-SCNC: 107 MMOL/L (ref 98–107)
CHOLEST SERPL-MCNC: 162 MG/DL (ref 0–200)
CO2 SERPL-SCNC: 25.3 MMOL/L (ref 22–29)
CREAT SERPL-MCNC: 0.85 MG/DL (ref 0.57–1)
CREAT UR-MCNC: 153.2 MG/DL
DEPRECATED RDW RBC AUTO: 39.4 FL (ref 37–54)
EGFRCR SERPLBLD CKD-EPI 2021: 76.6 ML/MIN/1.73
EOSINOPHIL # BLD AUTO: 0.14 10*3/MM3 (ref 0–0.4)
EOSINOPHIL NFR BLD AUTO: 2.5 % (ref 0.3–6.2)
ERYTHROCYTE [DISTWIDTH] IN BLOOD BY AUTOMATED COUNT: 11.7 % (ref 12.3–15.4)
GLOBULIN UR ELPH-MCNC: 2.6 GM/DL
GLUCOSE SERPL-MCNC: 89 MG/DL (ref 65–99)
HBA1C MFR BLD: 4.9 % (ref 4.8–5.6)
HCT VFR BLD AUTO: 41.7 % (ref 34–46.6)
HDLC SERPL-MCNC: 65 MG/DL (ref 40–60)
HGB BLD-MCNC: 14.2 G/DL (ref 12–15.9)
IMM GRANULOCYTES # BLD AUTO: 0.01 10*3/MM3 (ref 0–0.05)
IMM GRANULOCYTES NFR BLD AUTO: 0.2 % (ref 0–0.5)
LDLC SERPL CALC-MCNC: 83 MG/DL (ref 0–100)
LDLC/HDLC SERPL: 1.26 {RATIO}
LYMPHOCYTES # BLD AUTO: 1.05 10*3/MM3 (ref 0.7–3.1)
LYMPHOCYTES NFR BLD AUTO: 19.1 % (ref 19.6–45.3)
MCH RBC QN AUTO: 31.3 PG (ref 26.6–33)
MCHC RBC AUTO-ENTMCNC: 34.1 G/DL (ref 31.5–35.7)
MCV RBC AUTO: 91.9 FL (ref 79–97)
MICROALBUMIN/CREAT UR: NORMAL MG/G{CREAT}
MONOCYTES # BLD AUTO: 0.46 10*3/MM3 (ref 0.1–0.9)
MONOCYTES NFR BLD AUTO: 8.4 % (ref 5–12)
NEUTROPHILS NFR BLD AUTO: 3.8 10*3/MM3 (ref 1.7–7)
NEUTROPHILS NFR BLD AUTO: 69.1 % (ref 42.7–76)
NRBC BLD AUTO-RTO: 0 /100 WBC (ref 0–0.2)
PLATELET # BLD AUTO: 271 10*3/MM3 (ref 140–450)
PMV BLD AUTO: 11 FL (ref 6–12)
POTASSIUM SERPL-SCNC: 4.3 MMOL/L (ref 3.5–5.2)
PROT SERPL-MCNC: 6.8 G/DL (ref 6–8.5)
RBC # BLD AUTO: 4.54 10*6/MM3 (ref 3.77–5.28)
SODIUM SERPL-SCNC: 141 MMOL/L (ref 136–145)
TRIGL SERPL-MCNC: 75 MG/DL (ref 0–150)
VLDLC SERPL-MCNC: 14 MG/DL (ref 5–40)
WBC NRBC COR # BLD AUTO: 5.5 10*3/MM3 (ref 3.4–10.8)

## 2024-10-16 PROCEDURE — 80053 COMPREHEN METABOLIC PANEL: CPT

## 2024-10-16 PROCEDURE — 85025 COMPLETE CBC W/AUTO DIFF WBC: CPT

## 2024-10-16 PROCEDURE — 82043 UR ALBUMIN QUANTITATIVE: CPT

## 2024-10-16 PROCEDURE — 82570 ASSAY OF URINE CREATININE: CPT

## 2024-10-16 PROCEDURE — 36415 COLL VENOUS BLD VENIPUNCTURE: CPT

## 2024-10-16 PROCEDURE — 80061 LIPID PANEL: CPT

## 2024-10-16 PROCEDURE — 83036 HEMOGLOBIN GLYCOSYLATED A1C: CPT

## 2024-10-23 ENCOUNTER — TELEPHONE (OUTPATIENT)
Dept: FAMILY MEDICINE CLINIC | Facility: CLINIC | Age: 64
End: 2024-10-23

## 2024-10-23 NOTE — TELEPHONE ENCOUNTER
Caller: Cassandra Maria    Relationship: Self    Best call back number:     [phone]     What medication are you requesting: PREDNISONE AND ANTIBIOTIC    What are your current symptoms: RASH ON ARMS, RED AND PUFFY    How long have you been experiencing symptoms: ABOUT 30 MINUTES    Have you had these symptoms before:    [x] Yes  [] No    Have you been treated for these symptoms before:   [x] Yes  [] No    If a prescription is needed, what is your preferred pharmacy and phone number: [id number] - [place], [place] - [address] AT [facility] [phone] [facility] [phone]      Additional notes:  PATIENT STATES THAT THIS HAS HAPPENED BEFORE AND THAT THE LAST TIME WAS A FEW MONTHS AGO. SHE STATES THAT LAST TIME MARY ALICE GAVE HER PREDNISONE AND AN ANTIBIOTIC.

## 2024-12-01 DIAGNOSIS — E55.9 VITAMIN D DEFICIENCY: ICD-10-CM

## 2024-12-02 RX ORDER — ERGOCALCIFEROL 1.25 MG/1
50000 CAPSULE, LIQUID FILLED ORAL
Qty: 13 CAPSULE | Refills: 1 | Status: SHIPPED | OUTPATIENT
Start: 2024-12-02

## 2025-01-04 DIAGNOSIS — E11.9 TYPE 2 DIABETES MELLITUS WITHOUT COMPLICATION, WITHOUT LONG-TERM CURRENT USE OF INSULIN: ICD-10-CM

## 2025-01-06 NOTE — TELEPHONE ENCOUNTER
LAST RX: 10/15/2024 w/ 2 RF  (This sig is different than the previous. Please verify)   LAST OV: 10/15/2024  UPCOMING OV: 04/15/2025

## 2025-01-07 RX ORDER — TIRZEPATIDE 5 MG/.5ML
INJECTION, SOLUTION SUBCUTANEOUS
Qty: 2 ML | Refills: 0 | Status: SHIPPED | OUTPATIENT
Start: 2025-01-07

## 2025-01-08 NOTE — PROGRESS NOTES
"Chief Complaint  Post-op Follow-up (14m post op)    Subjective          History of Present Illness  Cassandra Maria is a 64 y.o. female who presents to [facility] for Postoperative Follow-Up of Bilateral breast scar revision 11/7/23.    Pt presents today for 14m post op. Doing well with no issues or concerns.  Pt states she will get an itch off and on.    Mammo 3/29/24-normal    Photos obtained today  History of Present Illness  The patient presents for a postoperative follow-up.    She reports overall satisfaction with the outcome of her breast surgery, noting that the scar appears to be healing well. She experiences occasional itching at the surgical site, which she manages by gentle rubbing. Her most recent mammogram was conducted in March 2024, yielding normal results.        Allergies: Gabapentin  Allergies Reconciled.    Review of Systems   All review of system has been reviewed and it  is negative except the ones note above.     Objective     /80 (BP Location: Right arm, Patient Position: Sitting, Cuff Size: Adult)   Pulse 73   Ht 152.4 cm (60\")   Wt 80.7 kg (178 lb)   SpO2 97%   BMI 34.76 kg/m²     Body mass index is 34.76 kg/m².            Physical Exam        General Inspection: Incision healing well, no swelling, no erythema, no drainage.    Result Review :                Assessment and Plan      Diagnoses and all orders for this visit:    1. Macromastia (Primary)        Plan:     Assessment & Plan  1. Postoperative status following breast surgery.  Her surgical scar is demonstrating satisfactory healing progress. She reports occasional itching, which she manages by rubbing the area. She is advised to return for a follow-up visit should any complications arise.    PROCEDURE  The patient underwent breast surgery.          Follow Up     No follow-ups on file.    Patient was given instructions and counseling regarding her condition. Please see " specific information pulled into the AVS if appropriate.     Daniele Walker MD  01/16/2025      Patient or patient representative verbalized consent for the use of Ambient Listening during the visit with  Daniele Walker MD for chart documentation. 1/20/2025  17:33 EST

## 2025-01-16 ENCOUNTER — OFFICE VISIT (OUTPATIENT)
Dept: PLASTIC SURGERY | Facility: CLINIC | Age: 65
End: 2025-01-16
Payer: MEDICAID

## 2025-01-16 VITALS
DIASTOLIC BLOOD PRESSURE: 80 MMHG | HEIGHT: 60 IN | OXYGEN SATURATION: 97 % | SYSTOLIC BLOOD PRESSURE: 120 MMHG | HEART RATE: 73 BPM | WEIGHT: 178 LBS | BODY MASS INDEX: 34.95 KG/M2

## 2025-01-16 DIAGNOSIS — N62 MACROMASTIA: Primary | ICD-10-CM

## 2025-01-16 PROCEDURE — 99024 POSTOP FOLLOW-UP VISIT: CPT | Performed by: SURGERY

## 2025-01-22 ENCOUNTER — TELEPHONE (OUTPATIENT)
Dept: FAMILY MEDICINE CLINIC | Facility: CLINIC | Age: 65
End: 2025-01-22
Payer: MEDICAID

## 2025-01-22 DIAGNOSIS — E11.9 TYPE 2 DIABETES MELLITUS WITHOUT COMPLICATION, WITHOUT LONG-TERM CURRENT USE OF INSULIN: Primary | ICD-10-CM

## 2025-01-22 NOTE — TELEPHONE ENCOUNTER
Caller: Cassandra Maria    Relationship: Self    Best call back number: [phone]     What medication are you requesting: BLOOD GLUCOSE KIT, LANCETS, STRIPES     What are your current symptoms: TO CHECK HER SUGAR     If a prescription is needed, what is your preferred pharmacy and phone number: [id number] - [place], [place] - [address] AT [facility] [phone] [facility] [phone]      Additional notes: PLEASE CALL AND ADVISE.        Sepsis Criteria were met:

## 2025-01-23 RX ORDER — BLOOD-GLUCOSE METER
1 KIT MISCELLANEOUS DAILY
Qty: 1 EACH | Refills: 0 | Status: SHIPPED | OUTPATIENT
Start: 2025-01-23

## 2025-01-23 RX ORDER — PEN NEEDLE, DIABETIC 31 GX5/16"
NEEDLE, DISPOSABLE MISCELLANEOUS
Qty: 100 EACH | Refills: 1 | Status: SHIPPED | OUTPATIENT
Start: 2025-01-23

## 2025-01-23 RX ORDER — LANCETS 30 GAUGE
EACH MISCELLANEOUS
Qty: 100 EACH | Refills: 1 | Status: SHIPPED | OUTPATIENT
Start: 2025-01-23

## 2025-01-23 NOTE — TELEPHONE ENCOUNTER
Kit and supplies sent. If not covered by insurance will have to buy OTC. Pt aware and voiced understanding.

## 2025-02-01 DIAGNOSIS — E11.9 TYPE 2 DIABETES MELLITUS WITHOUT COMPLICATION, WITHOUT LONG-TERM CURRENT USE OF INSULIN: ICD-10-CM

## 2025-02-03 RX ORDER — TIRZEPATIDE 5 MG/.5ML
INJECTION, SOLUTION SUBCUTANEOUS
Qty: 2 ML | Refills: 0 | Status: SHIPPED | OUTPATIENT
Start: 2025-02-03

## 2025-03-07 DIAGNOSIS — E11.9 TYPE 2 DIABETES MELLITUS WITHOUT COMPLICATION, WITHOUT LONG-TERM CURRENT USE OF INSULIN: ICD-10-CM

## 2025-03-10 RX ORDER — TIRZEPATIDE 5 MG/.5ML
INJECTION, SOLUTION SUBCUTANEOUS
Qty: 2 ML | Refills: 1 | Status: SHIPPED | OUTPATIENT
Start: 2025-03-10

## 2025-03-19 ENCOUNTER — OFFICE VISIT (OUTPATIENT)
Dept: FAMILY MEDICINE CLINIC | Facility: CLINIC | Age: 65
End: 2025-03-19
Payer: MEDICAID

## 2025-03-19 ENCOUNTER — HOSPITAL ENCOUNTER (OUTPATIENT)
Dept: GENERAL RADIOLOGY | Facility: HOSPITAL | Age: 65
Discharge: HOME | End: 2025-03-19
Admitting: NURSE PRACTITIONER
Payer: MEDICAID

## 2025-03-19 VITALS
WEIGHT: 180 LBS | SYSTOLIC BLOOD PRESSURE: 136 MMHG | OXYGEN SATURATION: 99 % | DIASTOLIC BLOOD PRESSURE: 54 MMHG | BODY MASS INDEX: 35.34 KG/M2 | HEIGHT: 60 IN | HEART RATE: 69 BPM

## 2025-03-19 DIAGNOSIS — I10 HYPERTENSION, UNSPECIFIED TYPE: ICD-10-CM

## 2025-03-19 DIAGNOSIS — M54.42 ACUTE BILATERAL LOW BACK PAIN WITH LEFT-SIDED SCIATICA: ICD-10-CM

## 2025-03-19 DIAGNOSIS — Z13.820 OSTEOPOROSIS SCREENING: Primary | ICD-10-CM

## 2025-03-19 DIAGNOSIS — Z78.0 POST-MENOPAUSAL: ICD-10-CM

## 2025-03-19 DIAGNOSIS — M85.80 OSTEOPENIA, UNSPECIFIED LOCATION: ICD-10-CM

## 2025-03-19 PROCEDURE — 72100 X-RAY EXAM L-S SPINE 2/3 VWS: CPT

## 2025-03-19 RX ORDER — CYCLOBENZAPRINE HCL 5 MG
5 TABLET ORAL 2 TIMES DAILY PRN
Qty: 30 TABLET | Refills: 0 | Status: SHIPPED | OUTPATIENT
Start: 2025-03-19

## 2025-03-19 RX ORDER — DICLOFENAC SODIUM 75 MG/1
75 TABLET, DELAYED RELEASE ORAL 2 TIMES DAILY
Qty: 30 TABLET | Refills: 0 | Status: SHIPPED | OUTPATIENT
Start: 2025-03-19

## 2025-03-19 NOTE — ASSESSMENT & PLAN NOTE
Osteopenia noted on most recent DEXA scan, patient does have history of spinal fracture, we will recheck DEXA today

## 2025-03-19 NOTE — PROGRESS NOTES
Chief Complaint  Back Pain    SUBJECTIVE  Cassandra Maria presents to [facility] due to low back pain. Pt states it has been bothering her for a very long time but it is getting worse over the last several months. States when she is sitting she has a constant pressure type of pain in her low back, mostly to the left side, and when she goes to stand up, the pain gets much worse and she has to Lisette and wait a second before straitening up due to the pain. Pt states has been takign advil on an as-needed basis, states the pain is even bothering her at night and sometimes makes it difficult to sleep.  No saddle anesthesia or loss of control bowel or bladder  Patient does have a history of back pain, in  she had x-rays and MRI,    History of Present Illness  Past Medical History:   Diagnosis Date    Condition not found     Hernia    Diabetes mellitus     BG RUNS AROUND  IN AM    Foot pain, right 2018    Fracture of 5th metatarsal 2018    Gall stones     Heel pain     Hot flashes 04/10/2014    Impaired fasting glucose 2018    Joint pain 2014    Low back pain     Lumbar pain with radiation down left leg 2015    Macromastia     Migraine headache     Night sweats     Obesity 2018    Osteoporosis 2014    Pain in thoracic spine 04/10/2014    Sinus trouble     Vitamin D deficiency 04/10/2014      Family History   Problem Relation Age of Onset    Breast cancer Mother     Diabetes Mother     Liver cancer Father     Lung cancer Father     Breast cancer Sister     Diabetes Sister     Colon cancer Paternal Aunt 67         age not listed    Diabetes Paternal Aunt     Colon cancer Cousin 55    Malig Hyperthermia Neg Hx       Past Surgical History:   Procedure Laterality Date    BILATERAL BREAST REDUCTION Bilateral 2022    Procedure: BREAST REDUCTION BILATERAL;  Surgeon: Daniele Walker MD;  Location: [facility];  Service:  Plastics;  Laterality: Bilateral;    BILATERAL BREAST REDUCTION Bilateral 2023    Procedure: Bilateral breast scar revision.;  Surgeon: Daniele Walker MD;  [place] [facility] [place] [facility]  Service: Plastics;  Laterality: Bilateral;     SECTION      CHOLECYSTECTOMY      COLONOSCOPY  2016    LANI    COLONOSCOPY N/A 2023    Procedure: COLONOSCOPY;  Surgeon: Emiliano Jack MD;  Location: [facility] ENDOSCOPY;  Service: Gastroenterology;  Laterality: N/A;  HEMORRHOIDS    ENDOSCOPY  2016    HYSTERECTOMY      INCONTINENCE SURGERY      ROTATOR CUFF REPAIR      right    VENTRAL HERNIA REPAIR          Current Outpatient Medications:     acyclovir (Zovirax) 5 % ointment, Apply 1 Application topically to the appropriate area as directed Every 3 (Three) Hours., Disp: 5 g, Rfl: 0    Alcohol Swabs (Alcohol Prep) pads, Use as directed to check blood sugar once daily., Disp: 100 each, Rfl: 1    atorvastatin (LIPITOR) 10 MG tablet, Take 1 tablet by mouth Daily., Disp: 90 tablet, Rfl: 1    calamine 8-8 % lotion lotion, Apply  topically to the appropriate area as directed Every 8 (Eight) Hours As Needed., Disp: , Rfl:     diclofenac (VOLTAREN) 75 MG EC tablet, Take 1 tablet by mouth 2 (Two) Times a Day., Disp: 30 tablet, Rfl: 0    Diclofenac Sodium (VOLTAREN) 1 % gel gel, Apply 4 g topically to the appropriate area as directed 4 (Four) Times a Day As Needed (pain)., Disp: 150 g, Rfl: 2    fluticasone (FLONASE) 50 MCG/ACT nasal spray, SHAKE LIQUID AND USE 2 SPRAYS IN EACH NOSTRIL EVERY DAY, Disp: 48 g, Rfl: 1    glucose blood test strip, Use as directed to check blood sugar once daily., Disp: 100 each, Rfl: 1    glucose monitor monitoring kit, Use 1 each Daily. Use as directed to check blood sugar once daily., Disp: 1 each, Rfl: 0    hydrocortisone 1 % lotion, Apply  topically to the appropriate area as directed 2 (Two) Times a Day., Disp: , Rfl:     Lancets misc, Use as directed to check blood  "sugar once daily., Disp: 100 each, Rfl: 1    lisinopril (PRINIVIL,ZESTRIL) 5 MG tablet, Take 1 tablet by mouth Daily., Disp: 90 tablet, Rfl: 1    metFORMIN (GLUCOPHAGE) 1000 MG tablet, Take 1 tablet by mouth 2 (Two) Times a Day With Meals., Disp: 180 tablet, Rfl: 1    multivitamin with minerals (HAIR SKIN & NAILS ADVANCED PO), Take 1 tablet by mouth Daily., Disp: , Rfl:     Tirzepatide (Mounjaro) 5 MG/0.5ML solution auto-injector, ADMINISTER 5 MG UNDER THE SKIN 1 TIME EVERY WEEK AS DIRECTED, Disp: 2 mL, Rfl: 1    vitamin D (ERGOCALCIFEROL) 1.25 MG (05142 UT) capsule capsule, TAKE 1 CAPSULE BY MOUTH EVERY 7 DAYS, Disp: 13 capsule, Rfl: 1    cyclobenzaprine (FLEXERIL) 5 MG tablet, Take 1 tablet by mouth 2 (Two) Times a Day As Needed for Muscle Spasms., Disp: 30 tablet, Rfl: 0    OBJECTIVE  Vital Signs:   /54   Pulse 69   Ht 152.4 cm (60\")   Wt 81.6 kg (180 lb)   SpO2 99%   BMI 35.15 kg/m²    Estimated body mass index is 35.15 kg/m² as calculated from the following:    Height as of this encounter: 152.4 cm (60\").    Weight as of this encounter: 81.6 kg (180 lb).     Wt Readings from Last 3 Encounters:   03/19/25 81.6 kg (180 lb)   01/16/25 80.7 kg (178 lb)   10/15/24 80.3 kg (177 lb)     BP Readings from Last 3 Encounters:   03/19/25 136/54   01/16/25 120/80   10/15/24 128/62       Physical Exam  Vitals reviewed.   Constitutional:       Appearance: Normal appearance. She is well-developed.   HENT:      Head: Normocephalic and atraumatic.      Right Ear: External ear normal.      Left Ear: External ear normal.   Eyes:      Conjunctiva/sclera: Conjunctivae normal.      Pupils: Pupils are equal, round, and reactive to light.   Cardiovascular:      Rate and Rhythm: Normal rate and regular rhythm.      Heart sounds: No murmur heard.     No friction rub. No gallop.   Pulmonary:      Effort: Pulmonary effort is normal.      Breath sounds: Normal breath sounds. No wheezing or rhonchi.   Musculoskeletal:      Lumbar " back: Tenderness present. No bony tenderness. Positive left straight leg raise test. Negative right straight leg raise test.      Comments: Left-sided paraspinal tenderness, tenderness to left glute    Skin:     General: Skin is warm and dry.   Neurological:      Mental Status: She is alert and oriented to person, place, and time.      Cranial Nerves: No cranial nerve deficit.   Psychiatric:         Mood and Affect: Mood and affect normal.         Behavior: Behavior normal.         Thought Content: Thought content normal.         Judgment: Judgment normal.          Result Review    CMP          6/12/2024    09:06 8/12/2024    13:02 10/16/2024    11:59   CMP   Glucose   89    BUN   9    Creatinine   0.85    EGFR   76.6    Sodium   141    Potassium   4.3    Chloride   107    Calcium   10.0    Total Protein 6.8  7.0  6.8    Albumin 4.3  4.4  4.2    Globulin   2.6    Total Bilirubin 1.4  1.5  1.5    Alkaline Phosphatase 51  60  54    AST (SGOT) 21  19  19    ALT (SGPT) 19  21  20    Albumin/Globulin Ratio   1.6    BUN/Creatinine Ratio   10.6    Anion Gap   8.7      CBC          10/16/2024    11:59   CBC   WBC 5.50    RBC 4.54    Hemoglobin 14.2    Hematocrit 41.7    MCV 91.9    MCH 31.3    MCHC 34.1    RDW 11.7    Platelets 271      Lipid Panel          10/16/2024    11:59   Lipid Panel   Total Cholesterol 162    Triglycerides 75    HDL Cholesterol 65    VLDL Cholesterol 14    LDL Cholesterol  83    LDL/HDL Ratio 1.26          No Images in the past 120 days found..     The above data has been reviewed by GENESIS Gates 03/19/2025 11:23 EDT.          Patient Care Team:  Marquita Mata APRN as PCP - General (Nurse Practitioner)  Daniele Walker MD as Consulting Physician (Plastic Surgery)  Tova Childs APRN as Nurse Practitioner (Plastic Surgery)  Charity Laughlin APRN as Nurse Practitioner (Gastroenterology)            ASSESSMENT & PLAN    Diagnoses and all orders for this visit:    1.  Osteoporosis screening (Primary)  -     DEXA Bone Density Axial; Future    2. Acute bilateral low back pain with left-sided sciatica  -     diclofenac (VOLTAREN) 75 MG EC tablet; Take 1 tablet by mouth 2 (Two) Times a Day.  Dispense: 30 tablet; Refill: 0  -     XR Spine Lumbar 2 or 3 View; Future  -     cyclobenzaprine (FLEXERIL) 5 MG tablet; Take 1 tablet by mouth 2 (Two) Times a Day As Needed for Muscle Spasms.  Dispense: 30 tablet; Refill: 0    3. Post-menopausal  -     DEXA Bone Density Axial; Future    4. Hypertension, unspecified type  Assessment & Plan:  Fairly well-controlled at present, continue current medication and we will recheck at follow-up      Side effects administration of medication discussed, discussed physical therapy but patient reports that this is actually made her back pain worse in the past, we will obtain x-rays and follow-up in a couple of weeks for reevaluation    Tobacco Use: Low Risk  (3/19/2025)    Patient History     Smoking Tobacco Use: Never     Smokeless Tobacco Use: Never     Passive Exposure: Never       Follow Up     Return in about 3 weeks (around 4/9/2025), or if symptoms worsen or fail to improve, for Next scheduled follow up.        Patient was given instructions and counseling regarding her condition or for health maintenance advice. Please see specific information pulled into the AVS if appropriate.   I have reviewed information obtained and documented by others and I have confirmed the accuracy of this documented note.    GENESIS Gates

## 2025-03-28 ENCOUNTER — OFFICE VISIT (OUTPATIENT)
Dept: FAMILY MEDICINE CLINIC | Facility: CLINIC | Age: 65
End: 2025-03-28
Payer: MEDICAID

## 2025-03-28 VITALS
HEART RATE: 78 BPM | DIASTOLIC BLOOD PRESSURE: 47 MMHG | BODY MASS INDEX: 35.1 KG/M2 | WEIGHT: 178.8 LBS | SYSTOLIC BLOOD PRESSURE: 127 MMHG | OXYGEN SATURATION: 97 % | HEIGHT: 60 IN

## 2025-03-28 DIAGNOSIS — R21 RASH: Primary | ICD-10-CM

## 2025-03-28 RX ORDER — CETIRIZINE HYDROCHLORIDE 10 MG/1
10 TABLET ORAL DAILY
Qty: 30 TABLET | Refills: 0 | Status: SHIPPED | OUTPATIENT
Start: 2025-03-28

## 2025-03-28 RX ORDER — METHYLPREDNISOLONE 4 MG/1
TABLET ORAL
Qty: 21 TABLET | Refills: 0 | Status: SHIPPED | OUTPATIENT
Start: 2025-03-28

## 2025-03-28 RX ORDER — CEPHALEXIN 500 MG/1
500 CAPSULE ORAL 3 TIMES DAILY
Qty: 21 CAPSULE | Refills: 0 | Status: SHIPPED | OUTPATIENT
Start: 2025-03-28 | End: 2025-04-04

## 2025-03-28 NOTE — PATIENT INSTRUCTIONS
Recommend trial of zyrtec daily.  Consider derm/allergist follow up, can discuss this more with PCP.   Meds for keflex and medrol as previously well tolerated, were sent today.

## 2025-03-28 NOTE — PROGRESS NOTES
"Chief Complaint  Rash    Subjective     Problem List  Visit Diagnosis   Encounters  Notes  Medications  Labs  Result Review Imaging  Media    Cassandra Maria presents to [facility]  History of Present Illness    History of Present Illness    64-year-old female, presents for a rash.  She notes that she does get these periodically and that her PCP typically treats her with antibiotic and steroid.  I was able to go back and find a record where PCP did treat patient with Keflex and Medrol for this previously.  Patient notes that she gets flares of hive-like lesions that lasts for 20 to 25 minutes per episode and will occur intermittently for a few days at a time.  She may go months between episodes or sometimes just weeks, has been happening for the past year and a half.  She states that her primary care has an further workup.  She has not seen a dermatologist and if this continues to happen I would recommend that she consider Derm or allergy consult.  It is mildly present right now but she has pictures where it was more significant 20 minutes ago.     Objective   Vital Signs:   /47 (BP Location: Right arm, Patient Position: Sitting, Cuff Size: Large Adult)   Pulse 78   Ht 152.4 cm (60\")   Wt 81.1 kg (178 lb 12.8 oz)   SpO2 97%   BMI 34.92 kg/m²     Physical Exam  Vitals reviewed.   Constitutional:       General: She is not in acute distress.     Appearance: Normal appearance.   HENT:      Head: Normocephalic and atraumatic.      Mouth/Throat:      Mouth: Mucous membranes are moist.   Eyes:      Conjunctiva/sclera: Conjunctivae normal.   Cardiovascular:      Rate and Rhythm: Normal rate and regular rhythm.      Heart sounds: Normal heart sounds.   Pulmonary:      Effort: Pulmonary effort is normal.      Breath sounds: Normal breath sounds.   Musculoskeletal:         General: No swelling.      Cervical back: Normal range of motion and neck supple.   Skin:     " General: Skin is warm.      Findings: Rash present.      Comments: Fine diffuse rash on neck/chest and visible on arms.  Patient notes it was present on the majority of her body and started off as larger welts but is fading they have gotten smaller.   Neurological:      General: No focal deficit present.      Mental Status: She is alert.   Psychiatric:         Mood and Affect: Mood normal.         Behavior: Behavior normal.          Physical Exam      Result Review :Labs  Result Review  Imaging  Med Tab  Media  Procedures       Common labs          6/12/2024    09:06 8/12/2024    13:02 10/16/2024    11:59 10/16/2024    12:05   Common Labs   Glucose   89     BUN   9     Creatinine   0.85     Sodium   141     Potassium   4.3     Chloride   107     Calcium   10.0     Albumin 4.3  4.4  4.2     Total Bilirubin 1.4  1.5  1.5     Alkaline Phosphatase 51  60  54     AST (SGOT) 21  19  19     ALT (SGPT) 19  21  20     WBC   5.50     Hemoglobin   14.2     Hematocrit   41.7     Platelets   271     Total Cholesterol   162     Triglycerides   75     HDL Cholesterol   65     LDL Cholesterol    83     Hemoglobin A1C   4.90     Microalbumin, Urine    <1.2        Results for orders placed or performed in visit on 10/16/24   Comprehensive Metabolic Panel    Collection Time: 10/16/24 11:59 AM    Specimen: Blood   Result Value Ref Range    Glucose 89 65 - 99 mg/dL    BUN 9 8 - 23 mg/dL    Creatinine 0.85 0.57 - 1.00 mg/dL    Sodium 141 136 - 145 mmol/L    Potassium 4.3 3.5 - 5.2 mmol/L    Chloride 107 98 - 107 mmol/L    CO2 25.3 22.0 - 29.0 mmol/L    Calcium 10.0 8.6 - 10.5 mg/dL    Total Protein 6.8 6.0 - 8.5 g/dL    Albumin 4.2 3.5 - 5.2 g/dL    ALT (SGPT) 20 1 - 33 U/L    AST (SGOT) 19 1 - 32 U/L    Alkaline Phosphatase 54 39 - 117 U/L    Total Bilirubin 1.5 (H) 0.0 - 1.2 mg/dL    Globulin 2.6 gm/dL    A/G Ratio 1.6 g/dL    BUN/Creatinine Ratio 10.6 7.0 - 25.0    Anion Gap 8.7 5.0 - 15.0 mmol/L    eGFR 76.6 >60.0 mL/min/1.73    Lipid Panel    Collection Time: 10/16/24 11:59 AM    Specimen: Blood   Result Value Ref Range    Total Cholesterol 162 0 - 200 mg/dL    Triglycerides 75 0 - 150 mg/dL    HDL Cholesterol 65 (H) 40 - 60 mg/dL    LDL Cholesterol  83 0 - 100 mg/dL    VLDL Cholesterol 14 5 - 40 mg/dL    LDL/HDL Ratio 1.26    Hemoglobin A1c    Collection Time: 10/16/24 11:59 AM    Specimen: Blood   Result Value Ref Range    Hemoglobin A1C 4.90 4.80 - 5.60 %   CBC Auto Differential    Collection Time: 10/16/24 11:59 AM    Specimen: Blood   Result Value Ref Range    WBC 5.50 3.40 - 10.80 10*3/mm3    RBC 4.54 3.77 - 5.28 10*6/mm3    Hemoglobin 14.2 12.0 - 15.9 g/dL    Hematocrit 41.7 34.0 - 46.6 %    MCV 91.9 79.0 - 97.0 fL    MCH 31.3 26.6 - 33.0 pg    MCHC 34.1 31.5 - 35.7 g/dL    RDW 11.7 (L) 12.3 - 15.4 %    RDW-SD 39.4 37.0 - 54.0 fl    MPV 11.0 6.0 - 12.0 fL    Platelets 271 140 - 450 10*3/mm3    Neutrophil % 69.1 42.7 - 76.0 %    Lymphocyte % 19.1 (L) 19.6 - 45.3 %    Monocyte % 8.4 5.0 - 12.0 %    Eosinophil % 2.5 0.3 - 6.2 %    Basophil % 0.7 0.0 - 1.5 %    Immature Grans % 0.2 0.0 - 0.5 %    Neutrophils, Absolute 3.80 1.70 - 7.00 10*3/mm3    Lymphocytes, Absolute 1.05 0.70 - 3.10 10*3/mm3    Monocytes, Absolute 0.46 0.10 - 0.90 10*3/mm3    Eosinophils, Absolute 0.14 0.00 - 0.40 10*3/mm3    Basophils, Absolute 0.04 0.00 - 0.20 10*3/mm3    Immature Grans, Absolute 0.01 0.00 - 0.05 10*3/mm3    nRBC 0.0 0.0 - 0.2 /100 WBC   Microalbumin / Creatinine Urine Ratio - Urine, Clean Catch    Collection Time: 10/16/24 12:05 PM    Specimen: Urine, Clean Catch   Result Value Ref Range    Microalbumin/Creatinine Ratio      Creatinine, Urine 153.2 mg/dL    Microalbumin, Urine <1.2 mg/dL       Results             Procedures        Assessment and Plan CC Problem List  Visit Diagnosis   ROS  Review (Popup)  Health Maintenance  Quality  BestPractice  Medications  SmartSets  SnapShot Encounters  Media   Diagnoses and all orders for this  visit:    1. Rash (Primary)  -     cetirizine (zyrTEC) 10 MG tablet; Take 1 tablet by mouth Daily.  Dispense: 30 tablet; Refill: 0  -     methylPREDNISolone (MEDROL) 4 MG dose pack; Take as directed on package instructions.  Dispense: 21 tablet; Refill: 0  -     cephalexin (KEFLEX) 500 MG capsule; Take 1 capsule by mouth 3 (Three) Times a Day for 7 days.  Dispense: 21 capsule; Refill: 0        Assessment & Plan      Reviewing past medical history patient has done well with a combination of Keflex and Medrol given a possible contact dermatitis and/or recurrent cellulitis aspect to her past rashes.  We will refill this regimen.  Recommend close follow-up with PCP and consideration for possible allergy versus Derm consult.    Also discussed that she could trial Zyrtec if there is something that she is becoming reexposed to.            Follow Up Instructions Charge Capture  Follow-up Communications   Return if symptoms worsen or fail to improve.  Patient was given instructions and counseling regarding her condition or for health maintenance advice. Please see specific information pulled into the AVS if appropriate.

## 2025-03-31 ENCOUNTER — HOSPITAL ENCOUNTER (OUTPATIENT)
Dept: MAMMOGRAPHY | Facility: HOSPITAL | Age: 65
Discharge: HOME | End: 2025-03-31
Admitting: NURSE PRACTITIONER
Payer: MEDICAID

## 2025-03-31 DIAGNOSIS — Z12.31 ENCOUNTER FOR SCREENING MAMMOGRAM FOR MALIGNANT NEOPLASM OF BREAST: ICD-10-CM

## 2025-03-31 PROCEDURE — 77067 SCR MAMMO BI INCL CAD: CPT

## 2025-03-31 PROCEDURE — 77063 BREAST TOMOSYNTHESIS BI: CPT

## 2025-04-02 ENCOUNTER — TELEPHONE (OUTPATIENT)
Dept: FAMILY MEDICINE CLINIC | Facility: CLINIC | Age: 65
End: 2025-04-02
Payer: MEDICAID

## 2025-04-02 NOTE — TELEPHONE ENCOUNTER
Patient called complaining of indigestion in her chest, hand tingling, became clammy and passed out for several seconds.  I advised patient to go to ER immediately for eval.  She sates understanding.

## 2025-04-07 NOTE — TELEPHONE ENCOUNTER
Spoke with pt. All symptom had subsided. Pt has appt already scheduled 04/15/25 and will discuss then possible allergies.

## 2025-04-07 NOTE — TELEPHONE ENCOUNTER
This patient most definitely needed to go to the ER, however I do not see an ER visit after this call, please contact the patient to ensure that she did seek evaluation somewhere

## 2025-04-14 ENCOUNTER — HOSPITAL ENCOUNTER (OUTPATIENT)
Dept: BONE DENSITY | Facility: HOSPITAL | Age: 65
Discharge: HOME | End: 2025-04-14
Admitting: NURSE PRACTITIONER
Payer: MEDICAID

## 2025-04-14 DIAGNOSIS — Z78.0 POST-MENOPAUSAL: ICD-10-CM

## 2025-04-14 DIAGNOSIS — Z13.820 OSTEOPOROSIS SCREENING: ICD-10-CM

## 2025-04-14 PROCEDURE — 77080 DXA BONE DENSITY AXIAL: CPT

## 2025-04-15 ENCOUNTER — OFFICE VISIT (OUTPATIENT)
Dept: FAMILY MEDICINE CLINIC | Facility: CLINIC | Age: 65
End: 2025-04-15
Payer: MEDICAID

## 2025-04-15 ENCOUNTER — HOSPITAL ENCOUNTER (OUTPATIENT)
Dept: GENERAL RADIOLOGY | Facility: HOSPITAL | Age: 65
Discharge: HOME | End: 2025-04-15
Admitting: NURSE PRACTITIONER
Payer: MEDICAID

## 2025-04-15 VITALS
BODY MASS INDEX: 35.34 KG/M2 | WEIGHT: 180 LBS | HEIGHT: 60 IN | HEART RATE: 79 BPM | SYSTOLIC BLOOD PRESSURE: 132 MMHG | OXYGEN SATURATION: 98 % | DIASTOLIC BLOOD PRESSURE: 61 MMHG

## 2025-04-15 DIAGNOSIS — E11.9 TYPE 2 DIABETES MELLITUS WITHOUT COMPLICATION, WITHOUT LONG-TERM CURRENT USE OF INSULIN: ICD-10-CM

## 2025-04-15 DIAGNOSIS — I10 HYPERTENSION, UNSPECIFIED TYPE: ICD-10-CM

## 2025-04-15 DIAGNOSIS — Z13.29 THYROID DISORDER SCREEN: ICD-10-CM

## 2025-04-15 DIAGNOSIS — E78.5 HYPERLIPIDEMIA, UNSPECIFIED HYPERLIPIDEMIA TYPE: ICD-10-CM

## 2025-04-15 DIAGNOSIS — M25.512 ACUTE PAIN OF LEFT SHOULDER: ICD-10-CM

## 2025-04-15 DIAGNOSIS — R55 SYNCOPE, UNSPECIFIED SYNCOPE TYPE: Primary | ICD-10-CM

## 2025-04-15 DIAGNOSIS — R21 RASH: ICD-10-CM

## 2025-04-15 PROCEDURE — 73030 X-RAY EXAM OF SHOULDER: CPT

## 2025-04-15 RX ORDER — ATORVASTATIN CALCIUM 10 MG/1
10 TABLET, FILM COATED ORAL DAILY
Qty: 90 TABLET | Refills: 1 | Status: SHIPPED | OUTPATIENT
Start: 2025-04-15

## 2025-04-15 RX ORDER — LISINOPRIL 5 MG/1
5 TABLET ORAL DAILY
Qty: 90 TABLET | Refills: 1 | Status: SHIPPED | OUTPATIENT
Start: 2025-04-15

## 2025-04-15 RX ORDER — CETIRIZINE HYDROCHLORIDE 10 MG/1
10 TABLET ORAL DAILY
Qty: 30 TABLET | Refills: 0 | Status: SHIPPED | OUTPATIENT
Start: 2025-04-15

## 2025-04-15 NOTE — PROGRESS NOTES
"Chief Complaint  Diabetes, Hypertension, and Hyperlipidemia    SUBJECTIVE  Cassandra Maria presents to [facility]     Pt states she had an episode the other night, 4-2-25, [place], where she woke up feeling itching in her hands, jumped up out of bed and went to the sink, felt \"off kilter\" \"like looking through water\" and while standing at the sink and the next thing she knew she had passed out.// states that she was not having any SOB, was having some epigastric discomfort. States woke up to her  yelling at her, states just rested for a bit and felt fine after that , and has had no other symptoms since  .   States she was being treated for reoccurring rash at that time, and the rash flared up during that episode despite being on meds for it.  States that when her rash starts it typically starts with her feeling itching in her hands and body, then the rash breaks out.    Patient reports that when she came to from her episode she did have a little bit of a rash on her arms, but it resolved fairly quickly    Patient also complains of left shoulder pain, s/p fall (from syncope episode) - since that time pain seems to have gotten somewhat worse, limited range of motion, feels weak    History of Present Illness  Past Medical History:   Diagnosis Date    Condition not found     Hernia    Diabetes mellitus     BG RUNS AROUND  IN AM    Foot pain, right 08/02/2018    Fracture of 5th metatarsal 08/02/2018    Gall stones     Heel pain     Hot flashes 04/10/2014    Impaired fasting glucose 05/11/2018    Joint pain 02/27/2014    Low back pain     Lumbar pain with radiation down left leg 04/01/2015    Macromastia     Migraine headache     Night sweats     Obesity 05/11/2018    Osteoporosis 02/27/2014    Pain in thoracic spine 04/10/2014    Sinus trouble     Vitamin D deficiency 04/10/2014      Family History   Problem Relation Age of Onset    Breast cancer " Mother     Diabetes Mother     Liver cancer Father     Lung cancer Father     Breast cancer Sister     Diabetes Sister     Colon cancer Paternal Aunt 67         age not listed    Diabetes Paternal Aunt     Colon cancer Cousin 55    Malig Hyperthermia Neg Hx       Past Surgical History:   Procedure Laterality Date    BILATERAL BREAST REDUCTION Bilateral 2022    Procedure: BREAST REDUCTION BILATERAL;  Surgeon: Daniele Walker MD;  [place] [facility] [place] [facility] [place]: [place];  Laterality: Bilateral;    BILATERAL BREAST REDUCTION Bilateral 2023    Procedure: Bilateral breast scar revision.;  Surgeon: Daniele Walker MD;  [place] [facility] [place] [facility] [place]  Service: [place]astics;  Laterality: Bilateral;     SECTION      CHOLECYSTECTOMY      COLONOSCOPY  2016    LANI    COLONOSCOPY N/A 2023    Procedure: COLONOSCOPY;  Surgeon: Emiliano Jack MD;  Location: [facility] ENDOSCOPY;  Service: Gastroenterology;  Laterality: N/A;  HEMORRHOIDS    ENDOSCOPY  2016    HYSTERECTOMY      INCONTINENCE SURGERY      ROTATOR CUFF REPAIR      right    VENTRAL HERNIA REPAIR          Current Outpatient Medications:     acyclovir (Zovirax) 5 % ointment, Apply 1 Application topically to the appropriate area as directed Every 3 (Three) Hours., Disp: 5 g, Rfl: 0    Alcohol Swabs (Alcohol Prep) pads, Use as directed to check blood sugar once daily., Disp: 100 each, Rfl: 1    atorvastatin (LIPITOR) 10 MG tablet, Take 1 tablet by mouth Daily., Disp: 90 tablet, Rfl: 1    calamine 8-8 % lotion lotion, Apply  topically to the appropriate area as directed Every 8 (Eight) Hours As Needed., Disp: , Rfl:     cetirizine (zyrTEC) 10 MG tablet, Take 1 tablet by mouth Daily., Disp: 30 tablet, Rfl: 0    cyclobenzaprine (FLEXERIL) 5 MG tablet, Take 1 tablet by mouth 2 (Two) Times a Day As Needed for Muscle Spasms., Disp: 30 tablet, Rfl: 0    diclofenac (VOLTAREN) 75 MG EC tablet, Take 1 tablet by mouth 2  "(Two) Times a Day., Disp: 30 tablet, Rfl: 0    Diclofenac Sodium (VOLTAREN) 1 % gel gel, Apply 4 g topically to the appropriate area as directed 4 (Four) Times a Day As Needed (pain)., Disp: 150 g, Rfl: 2    fluticasone (FLONASE) 50 MCG/ACT nasal spray, SHAKE LIQUID AND USE 2 SPRAYS IN EACH NOSTRIL EVERY DAY, Disp: 48 g, Rfl: 1    glucose blood test strip, Use as directed to check blood sugar once daily., Disp: 100 each, Rfl: 1    glucose monitor monitoring kit, Use 1 each Daily. Use as directed to check blood sugar once daily., Disp: 1 each, Rfl: 0    hydrocortisone 1 % lotion, Apply  topically to the appropriate area as directed 2 (Two) Times a Day., Disp: , Rfl:     Lancets misc, Use as directed to check blood sugar once daily., Disp: 100 each, Rfl: 1    lisinopril (PRINIVIL,ZESTRIL) 5 MG tablet, Take 1 tablet by mouth Daily., Disp: 90 tablet, Rfl: 1    metFORMIN (GLUCOPHAGE) 1000 MG tablet, Take 1 tablet by mouth 2 (Two) Times a Day With Meals., Disp: 180 tablet, Rfl: 1    multivitamin with minerals (HAIR SKIN & NAILS ADVANCED PO), Take 1 tablet by mouth Daily., Disp: , Rfl:     Tirzepatide (Mounjaro) 5 MG/0.5ML solution auto-injector, ADMINISTER 5 MG UNDER THE SKIN 1 TIME EVERY WEEK AS DIRECTED, Disp: 2 mL, Rfl: 1    vitamin D (ERGOCALCIFEROL) 1.25 MG (49025 UT) capsule capsule, TAKE 1 CAPSULE BY MOUTH EVERY 7 DAYS, Disp: 13 capsule, Rfl: 1    OBJECTIVE  Vital Signs:   /61   Pulse 79   Ht 152.4 cm (60\")   Wt 81.6 kg (180 lb)   SpO2 98%   BMI 35.15 kg/m²    Estimated body mass index is 35.15 kg/m² as calculated from the following:    Height as of this encounter: 152.4 cm (60\").    Weight as of this encounter: 81.6 kg (180 lb).     Wt Readings from Last 3 Encounters:   04/15/25 81.6 kg (180 lb)   03/28/25 81.1 kg (178 lb 12.8 oz)   03/19/25 81.6 kg (180 lb)     BP Readings from Last 3 Encounters:   04/15/25 132/61   03/28/25 127/47   03/19/25 136/54       Physical Exam  Vitals reviewed. "   Constitutional:       Appearance: Normal appearance. She is well-developed.   HENT:      Head: Normocephalic and atraumatic.      Right Ear: External ear normal.      Left Ear: External ear normal.   Eyes:      Conjunctiva/sclera: Conjunctivae normal.      Pupils: Pupils are equal, round, and reactive to light.   Cardiovascular:      Rate and Rhythm: Normal rate and regular rhythm.      Heart sounds: No murmur heard.     No friction rub. No gallop.   Pulmonary:      Effort: Pulmonary effort is normal.      Breath sounds: Normal breath sounds. No wheezing or rhonchi.   Musculoskeletal:      Right shoulder: Tenderness present. No swelling. Decreased range of motion. Decreased strength.   Skin:     General: Skin is warm and dry.   Neurological:      Mental Status: She is alert and oriented to person, place, and time.      Cranial Nerves: No cranial nerve deficit.   Psychiatric:         Mood and Affect: Mood and affect normal.         Behavior: Behavior normal.         Thought Content: Thought content normal.         Judgment: Judgment normal.          Result Review    CMP          6/12/2024    09:06 8/12/2024    13:02 10/16/2024    11:59   CMP   Glucose   89    BUN   9    Creatinine   0.85    EGFR   76.6    Sodium   141    Potassium   4.3    Chloride   107    Calcium   10.0    Total Protein 6.8  7.0  6.8    Albumin 4.3  4.4  4.2    Globulin   2.6    Total Bilirubin 1.4  1.5  1.5    Alkaline Phosphatase 51  60  54    AST (SGOT) 21  19  19    ALT (SGPT) 19  21  20    Albumin/Globulin Ratio   1.6    BUN/Creatinine Ratio   10.6    Anion Gap   8.7      CBC          10/16/2024    11:59   CBC   WBC 5.50    RBC 4.54    Hemoglobin 14.2    Hematocrit 41.7    MCV 91.9    MCH 31.3    MCHC 34.1    RDW 11.7    Platelets 271      Lipid Panel          10/16/2024    11:59   Lipid Panel   Total Cholesterol 162    Triglycerides 75    HDL Cholesterol 65    VLDL Cholesterol 14    LDL Cholesterol  83    LDL/HDL Ratio 1.26        Most  Recent A1C          10/16/2024    11:59   HGBA1C Most Recent   Hemoglobin A1C 4.90        DEXA Bone Density Axial  Result Date: 4/14/2025  Impression: Osteopenia -Based upon the FRAX score, patient does not meet criteria for initiation of pharmacological treatment recommendations for prevention of osteoporosis per the National Osteoporosis Foundation (NOF) guidelines. However, individualized treatment decisions should be made accounting for additional clinical factors. Report dictated by: [place]  I have personally reviewed this case and agree with the findings above: Electronically Signed: Brayan Freire MD  4/14/2025 2:42 PM EDT  Workstation ID: [id number]    Mammo Screening Digital Tomosynthesis Bilateral With CAD  Result Date: 3/31/2025  No mammographic evidence of malignancy.  Recommend annual screening mammography.  BI-RADS ASSESSMENT: BI-RADS 2. Benign findings.  Note:  It has been reported that there is approximately a 15% false negative rate in mammography.  Therefore, management of a palpable abnormality should not be deferred because of a negative mammogram.  3/31/2025 3:21 PM by SYD SABILLON MD on Workstation: [id number]      XR Spine Lumbar 2 or 3 View  Result Date: 3/21/2025  Impression: Stable exam. Degenerative change. No acute osseous abnormalities are identified. Electronically Signed: Saurabh Perrin MD  3/21/2025 1:35 PM EDT  Workstation ID: [id number]       The above data has been reviewed by GENESIS Gates 04/15/2025 11:36 EDT.      ECG 12 Lead    Date/Time: 4/15/2025 1:40 PM  Performed by: Marquita Mata APRN    Authorized by: Marquita Mata APRN  Comparison: compared with previous ECG from 10/31/2023  Similar to previous ECG  Rhythm: sinus rhythm  Rate: normal  QRS axis: normal    Clinical impression: normal ECG  Comments: NSR            Patient Care Team:  Marquita Mata APRN as PCP - General (Nurse Practitioner)  Daniele Walker MD as Consulting Physician  (Plastic Surgery)  Tova Childs APRN as Nurse Practitioner (Plastic Surgery)  Charity Laughlin APRN as Nurse Practitioner (Gastroenterology)            ASSESSMENT & PLAN    Diagnoses and all orders for this visit:    1. Syncope, unspecified syncope type (Primary)  Assessment & Plan:  EKG normal, labs and echo for further eval, discussed with patient could possibly have been vasovagal, discussed with patient if she were to have any reoccurring symptoms she is to go straight to the ER for further evaluation.  Patient verbalized understanding    Orders:  -     ECG 12 Lead  -     Adult Transthoracic Echo Complete W/ Cont if Necessary Per Protocol; Future    2. Hyperlipidemia, unspecified hyperlipidemia type  Overview:  Stable on Lipitor, continue current medication    Orders:  -     Comprehensive Metabolic Panel; Future  -     Lipid Panel; Future  -     atorvastatin (LIPITOR) 10 MG tablet; Take 1 tablet by mouth Daily.  Dispense: 90 tablet; Refill: 1    3. Rash  -     Comprehensive Metabolic Panel; Future  -     Hemoglobin A1c; Future  -     Microalbumin / Creatinine Urine Ratio - Urine, Clean Catch; Future  -     cetirizine (zyrTEC) 10 MG tablet; Take 1 tablet by mouth Daily.  Dispense: 30 tablet; Refill: 0  -     ROSEY Direct Reflex to 11 Biomarker; Future  -     Cyclic citrul peptide antibody, IgG/IgA; Future  -     CK; Future  -     C-reactive protein; Future  -     Rheumatoid Factor; Future  -     Sedimentation rate; Future    4. Hypertension, unspecified type  Assessment & Plan:  Stable and well-controlled at present, continue current medication    Orders:  -     CBC & Differential; Future  -     TSH Rfx On Abnormal To Free T4; Future  -     Lipid Panel; Future  -     lisinopril (PRINIVIL,ZESTRIL) 5 MG tablet; Take 1 tablet by mouth Daily.  Dispense: 90 tablet; Refill: 1    5. Type 2 diabetes mellitus without complication, without long-term current use of insulin  Assessment & Plan:  Stable on metformin,  continue current medication    Orders:  -     Comprehensive Metabolic Panel; Future  -     Lipid Panel; Future  -     Hemoglobin A1c; Future  -     Microalbumin / Creatinine Urine Ratio - Urine, Clean Catch; Future  -     metFORMIN (GLUCOPHAGE) 1000 MG tablet; Take 1 tablet by mouth 2 (Two) Times a Day With Meals.  Dispense: 180 tablet; Refill: 1    6. Thyroid disorder screen  -     TSH Rfx On Abnormal To Free T4; Future    7. Acute pain of left shoulder  -     XR Shoulder 2+ View Left; Future         Tobacco Use: Low Risk  (4/15/2025)    Patient History     Smoking Tobacco Use: Never     Smokeless Tobacco Use: Never     Passive Exposure: Never       Follow Up     Return if symptoms worsen or fail to improve.        Patient was given instructions and counseling regarding her condition or for health maintenance advice. Please see specific information pulled into the AVS if appropriate.   I have reviewed information obtained and documented by others and I have confirmed the accuracy of this documented note.    GENESIS Gates         Acitretin Counseling:  I discussed with the patient the risks of acitretin including but not limited to hair loss, dry lips/skin/eyes, liver damage, hyperlipidemia, depression/suicidal ideation, photosensitivity.  Serious rare side effects can include but are not limited to pancreatitis, pseudotumor cerebri, bony changes, clot formation/stroke/heart attack.  Patient understands that alcohol is contraindicated since it can result in liver toxicity and significantly prolong the elimination of the drug by many years.

## 2025-04-15 NOTE — ASSESSMENT & PLAN NOTE
EKG normal, labs and echo for further eval, discussed with patient could possibly have been vasovagal, discussed with patient if she were to have any reoccurring symptoms she is to go straight to the ER for further evaluation.  Patient verbalized understanding

## 2025-04-16 ENCOUNTER — LAB (OUTPATIENT)
Dept: LAB | Facility: HOSPITAL | Age: 65
End: 2025-04-16
Payer: MEDICAID

## 2025-04-16 DIAGNOSIS — E11.9 TYPE 2 DIABETES MELLITUS WITHOUT COMPLICATION, WITHOUT LONG-TERM CURRENT USE OF INSULIN: ICD-10-CM

## 2025-04-16 DIAGNOSIS — I10 HYPERTENSION, UNSPECIFIED TYPE: ICD-10-CM

## 2025-04-16 DIAGNOSIS — R21 RASH: ICD-10-CM

## 2025-04-16 DIAGNOSIS — Z13.29 THYROID DISORDER SCREEN: ICD-10-CM

## 2025-04-16 DIAGNOSIS — E78.5 HYPERLIPIDEMIA, UNSPECIFIED HYPERLIPIDEMIA TYPE: ICD-10-CM

## 2025-04-16 LAB
BASOPHILS # BLD AUTO: 0.04 10*3/MM3 (ref 0–0.2)
BASOPHILS NFR BLD AUTO: 0.8 % (ref 0–1.5)
CHROMATIN AB SERPL-ACNC: <10 IU/ML (ref 0–14)
DEPRECATED RDW RBC AUTO: 39 FL (ref 37–54)
EOSINOPHIL # BLD AUTO: 0.16 10*3/MM3 (ref 0–0.4)
EOSINOPHIL NFR BLD AUTO: 3.2 % (ref 0.3–6.2)
ERYTHROCYTE [DISTWIDTH] IN BLOOD BY AUTOMATED COUNT: 12.1 % (ref 12.3–15.4)
ERYTHROCYTE [SEDIMENTATION RATE] IN BLOOD: 7 MM/HR (ref 0–30)
HBA1C MFR BLD: 5.3 % (ref 4.8–5.6)
HCT VFR BLD AUTO: 41.9 % (ref 34–46.6)
HGB BLD-MCNC: 14 G/DL (ref 12–15.9)
IMM GRANULOCYTES # BLD AUTO: 0.01 10*3/MM3 (ref 0–0.05)
IMM GRANULOCYTES NFR BLD AUTO: 0.2 % (ref 0–0.5)
LYMPHOCYTES # BLD AUTO: 1.01 10*3/MM3 (ref 0.7–3.1)
LYMPHOCYTES NFR BLD AUTO: 20.2 % (ref 19.6–45.3)
MCH RBC QN AUTO: 30.2 PG (ref 26.6–33)
MCHC RBC AUTO-ENTMCNC: 33.4 G/DL (ref 31.5–35.7)
MCV RBC AUTO: 90.3 FL (ref 79–97)
MONOCYTES # BLD AUTO: 0.42 10*3/MM3 (ref 0.1–0.9)
MONOCYTES NFR BLD AUTO: 8.4 % (ref 5–12)
NEUTROPHILS NFR BLD AUTO: 3.37 10*3/MM3 (ref 1.7–7)
NEUTROPHILS NFR BLD AUTO: 67.2 % (ref 42.7–76)
NRBC BLD AUTO-RTO: 0 /100 WBC (ref 0–0.2)
PLATELET # BLD AUTO: 264 10*3/MM3 (ref 140–450)
PMV BLD AUTO: 10.7 FL (ref 6–12)
RBC # BLD AUTO: 4.64 10*6/MM3 (ref 3.77–5.28)
TSH SERPL DL<=0.05 MIU/L-ACNC: 1.52 UIU/ML (ref 0.27–4.2)
WBC NRBC COR # BLD AUTO: 5.01 10*3/MM3 (ref 3.4–10.8)

## 2025-04-16 PROCEDURE — 80053 COMPREHEN METABOLIC PANEL: CPT

## 2025-04-16 PROCEDURE — 86140 C-REACTIVE PROTEIN: CPT

## 2025-04-16 PROCEDURE — 84443 ASSAY THYROID STIM HORMONE: CPT

## 2025-04-16 PROCEDURE — 86200 CCP ANTIBODY: CPT

## 2025-04-16 PROCEDURE — 36415 COLL VENOUS BLD VENIPUNCTURE: CPT

## 2025-04-16 PROCEDURE — 83036 HEMOGLOBIN GLYCOSYLATED A1C: CPT

## 2025-04-16 PROCEDURE — 85025 COMPLETE CBC W/AUTO DIFF WBC: CPT

## 2025-04-16 PROCEDURE — 82550 ASSAY OF CK (CPK): CPT

## 2025-04-16 PROCEDURE — 82570 ASSAY OF URINE CREATININE: CPT

## 2025-04-16 PROCEDURE — 86038 ANTINUCLEAR ANTIBODIES: CPT

## 2025-04-16 PROCEDURE — 85652 RBC SED RATE AUTOMATED: CPT

## 2025-04-16 PROCEDURE — 86431 RHEUMATOID FACTOR QUANT: CPT

## 2025-04-16 PROCEDURE — 80061 LIPID PANEL: CPT

## 2025-04-16 PROCEDURE — 82043 UR ALBUMIN QUANTITATIVE: CPT

## 2025-04-17 LAB
ALBUMIN SERPL-MCNC: 4.1 G/DL (ref 3.5–5.2)
ALBUMIN UR-MCNC: <1.2 MG/DL
ALBUMIN/GLOB SERPL: 1.5 G/DL
ALP SERPL-CCNC: 51 U/L (ref 39–117)
ALT SERPL W P-5'-P-CCNC: 22 U/L (ref 1–33)
ANION GAP SERPL CALCULATED.3IONS-SCNC: 10.8 MMOL/L (ref 5–15)
AST SERPL-CCNC: 24 U/L (ref 1–32)
BILIRUB SERPL-MCNC: 1.5 MG/DL (ref 0–1.2)
BUN SERPL-MCNC: 12 MG/DL (ref 8–23)
BUN/CREAT SERPL: 17.4 (ref 7–25)
CALCIUM SPEC-SCNC: 9.6 MG/DL (ref 8.6–10.5)
CHLORIDE SERPL-SCNC: 107 MMOL/L (ref 98–107)
CHOLEST SERPL-MCNC: 193 MG/DL (ref 0–200)
CK SERPL-CCNC: 61 U/L (ref 20–180)
CO2 SERPL-SCNC: 20.2 MMOL/L (ref 22–29)
CREAT SERPL-MCNC: 0.69 MG/DL (ref 0.57–1)
CREAT UR-MCNC: 93.8 MG/DL
CRP SERPL-MCNC: <0.3 MG/DL (ref 0–0.5)
EGFRCR SERPLBLD CKD-EPI 2021: 97.1 ML/MIN/1.73
GLOBULIN UR ELPH-MCNC: 2.7 GM/DL
GLUCOSE SERPL-MCNC: 92 MG/DL (ref 65–99)
HDLC SERPL-MCNC: 63 MG/DL (ref 40–60)
LDLC SERPL CALC-MCNC: 110 MG/DL (ref 0–100)
LDLC/HDLC SERPL: 1.71 {RATIO}
MICROALBUMIN/CREAT UR: NORMAL MG/G{CREAT}
POTASSIUM SERPL-SCNC: 4.3 MMOL/L (ref 3.5–5.2)
PROT SERPL-MCNC: 6.8 G/DL (ref 6–8.5)
SODIUM SERPL-SCNC: 138 MMOL/L (ref 136–145)
TRIGL SERPL-MCNC: 110 MG/DL (ref 0–150)
VLDLC SERPL-MCNC: 20 MG/DL (ref 5–40)

## 2025-04-18 LAB
ANA SER QL: NEGATIVE
CCP IGA+IGG SERPL IA-ACNC: 7 UNITS (ref 0–19)

## 2025-04-22 PROCEDURE — 87086 URINE CULTURE/COLONY COUNT: CPT | Performed by: NURSE PRACTITIONER

## 2025-04-22 PROCEDURE — 87077 CULTURE AEROBIC IDENTIFY: CPT | Performed by: NURSE PRACTITIONER

## 2025-04-22 PROCEDURE — 87186 SC STD MICRODIL/AGAR DIL: CPT | Performed by: NURSE PRACTITIONER

## 2025-04-25 ENCOUNTER — OFFICE VISIT (OUTPATIENT)
Dept: ORTHOPEDIC SURGERY | Facility: CLINIC | Age: 65
End: 2025-04-25
Payer: MEDICAID

## 2025-04-25 VITALS — BODY MASS INDEX: 35.34 KG/M2 | WEIGHT: 180 LBS | HEIGHT: 60 IN

## 2025-04-25 DIAGNOSIS — S49.92XA INJURY OF LEFT SHOULDER, INITIAL ENCOUNTER: Primary | ICD-10-CM

## 2025-04-25 NOTE — PROGRESS NOTES
"Chief Complaint  Initial Evaluation of the Left Shoulder    Subjective          Cassandra Maria presents to [facility] for an evaluation  of her left shoulder.     History of Present Illness    The patient presents here today for an evaluation  of her left shoulder. Her left shoulder has been bothering her since April 2, 2025 when she fainted and landed on her left shoulder. Since the injury, she has noticed decrease in range of motion and strength to her shoulder. She recently went to her family doctor where she had x-rays done on her left shoulder. She denies any prior surgery on her left shoulder.     Allergies   Allergen Reactions    Gabapentin Mental Status Change        Social History     Socioeconomic History    Marital status:    Tobacco Use    Smoking status: Never     Passive exposure: Never    Smokeless tobacco: Never   Vaping Use    Vaping status: Never Used   Substance and Sexual Activity    Alcohol use: Never    Drug use: Never    Sexual activity: Defer        I reviewed the patient's chief complaint, history of present illness, review of systems, past medical history, surgical history, family history, social history, medications, and allergy list.     REVIEW OF SYSTEMS    Constitutional: Denies fevers, chills, weight loss  Cardiovascular: Denies chest pain, shortness of breath  Skin: Denies rashes, acute skin changes  Neurologic: Denies headache, loss of consciousness  MSK: left shoulder pain       Objective   Vital Signs:   Ht 152.4 cm (60\")   Wt 81.6 kg (180 lb)   BMI 35.15 kg/m²     Body mass index is 35.15 kg/m².    Physical Exam    General: Alert. No acute distress.   Left upper extremity: forward elevation  to 90 degrees with pain, external rotation  at the side to 45 degrees, internal rotation to low lumbar with pain, 4/5 supraspinatus strength  with pain, 4/5 infraspinatus with pain, 5/5 subscap, positive  impingement, negative  speed's, negative  " [place], non tender to the acromioclavicular joint, neurovascularly intact, positive  pulses, sensation intact to the medial, radial and ulnar nerve     Procedures    Imaging Results (Most Recent)       None                     Assessment and Plan        XR Shoulder 2+ View Left  Result Date: 4/17/2025  Narrative: XR SHOULDER 2+ VW LEFT Date of Exam: 4/15/2025 12:23 PM EDT Indication: left shoulder pain s/p fall Comparison: None available. Findings: No fracture or malalignment is identified. Mild acromioclavicular osteoarthritis is noted. Soft tissues appear normal.     Impression: Impression: Mild acromioclavicular osteoarthritis. No acute fracture or malalignment. Electronically Signed: Td Michelle MD  4/17/2025 12:28 PM EDT  Workstation ID: [id number]    DEXA Bone Density Axial  Result Date: 4/14/2025  Narrative: DEXA BONE DENSITY AXIAL Date of Exam: 4/14/2025 1:15 PM EDT Indication: screening. Comparison: 1/19/2022 Findings: Lumbar Spine The BMD measured in the L1-L4 region is 1.243 g/cm2 with a T-score of 0.4. This patient is considered normal according to World Health Organization (WHO) criteria. When compared to the previous examination dated 1/19/2022, the bone mineral density of the L1-L4 spine has apparently decreased 1.3%. Femoral Neck The BMD measured at the left femoral neck is 0.816 g/cm2 with a T-score of -1.6. The BMD measured at the right femoral neck is 0.847 g/cm2 with a T-score of -1.4. The BMD of the mean femoral neck is 0.832 g/cm2 with a T score of -1.5. This patient is considered osteopenic according to World Health Organization (WHO) criteria. When compared to the previous examination dated 1/19/2022, the bone mineral density of the femoral neck mean has apparently decreased 7.1%. Total Hip The BMD of the total left hip is 0.938 g/cm2 with a T-score of -0.6. The BMD of the total right hip is 0.924 g/cm2 with a T-score of -0.7. The BMD of the mean total hip is 0.931 g/cm2 with a T-score of  -0.6. This patient is considered normal according to World Health Organization (WHO) criteria. FRAX Score: The estimated 10 year risk of a major osteoporotic fracture is calculated to be 13.6% and a hip fracture of 1.4%.     Impression: Impression: Osteopenia -Based upon the FRAX score, patient does not meet criteria for initiation of pharmacological treatment recommendations for prevention of osteoporosis per the National Osteoporosis Foundation (NOF) guidelines. However, individualized treatment decisions should be made accounting for additional clinical factors. Report dictated by: Florida Salomon  I have personally reviewed this case and agree with the findings above: Electronically Signed: Brayan Freire MD  4/14/2025 2:42 PM EDT  Workstation ID: [id number]    Mammo Screening Digital Tomosynthesis Bilateral With CAD  Result Date: 3/31/2025  Narrative: MAMMO SCREENING DIGITAL TOMOSYNTHESIS BILATERAL W CAD-  Date of Exam: 3/31/2025 1:05 PM  Indication: Screening.  Comparison: Priors back to 2/16/2016  Technique: Routine screening mammogram images were obtained per protocol.  Tomosynthesis was utilized.  These mammographic images were interpreted with the assistance of a computer aided detection system.  Breast Density: The breast(s) are almost entirely fatty.  Findings: Postsurgical changes with fat necrosis are seen in both breasts.  No suspicious masses, suspicious microcalcifications, or architectural distortions are identified.      Impression: No mammographic evidence of malignancy.  Recommend annual screening mammography.  BI-RADS ASSESSMENT: BI-RADS 2. Benign findings.  Note:  It has been reported that there is approximately a 15% false negative rate in mammography.  Therefore, management of a palpable abnormality should not be deferred because of a negative mammogram.  3/31/2025 3:21 PM by SYD SABILLON MD on Workstation: [id number]         Diagnoses and all orders for this visit:    1. Injury of left  shoulder, initial encounter (Primary)      The patient presents here today for an evaluation  of her left shoulder.     MRI order placed today on her left shoulder to evaluate for internal derangement.     Home exercises given today and will continue current medications for pain control.     Call or return if worsening symptoms.    Scribed for Wesley Farrell MD by Carolin Contreras  04/25/2025   10:41 EDT         Follow Up       After MRI     Patient was given instructions and counseling regarding her condition or for health maintenance advice. Please see specific information pulled into the AVS if appropriate.     I have personally performed the services described in this document as scribed by the above individual and it is both accurate and complete. Wesley Farrell MD 04/25/25 11:36 EDT

## 2025-04-29 ENCOUNTER — HOSPITAL ENCOUNTER (OUTPATIENT)
Facility: HOSPITAL | Age: 65
Discharge: HOME | End: 2025-04-29
Admitting: NURSE PRACTITIONER
Payer: MEDICAID

## 2025-04-29 DIAGNOSIS — R55 SYNCOPE, UNSPECIFIED SYNCOPE TYPE: ICD-10-CM

## 2025-04-29 PROCEDURE — 93306 TTE W/DOPPLER COMPLETE: CPT

## 2025-05-02 DIAGNOSIS — S49.92XA INJURY OF LEFT SHOULDER, INITIAL ENCOUNTER: ICD-10-CM

## 2025-05-02 LAB
AORTIC DIMENSIONLESS INDEX: 0.78 (DI)
ASCENDING AORTA: 2.8 CM
AV MEAN PRESS GRAD SYS DOP V1V2: 2.1 MMHG
AV VMAX SYS DOP: 105.9 CM/SEC
BH CV ECHO MEAS - 2D AUTO EF SIEMENS: 55.1 %
BH CV ECHO MEAS - AO MAX PG: 4.5 MMHG
BH CV ECHO MEAS - AO ROOT DIAM: 3 CM
BH CV ECHO MEAS - AO V2 VTI: 21.9 CM
BH CV ECHO MEAS - AVA(I,D): 3 CM2
BH CV ECHO MEAS - EF(MOD-SP2): 57.2 %
BH CV ECHO MEAS - EF(MOD-SP4): 57.7 %
BH CV ECHO MEAS - IVS/LVPW: 1.3 CM
BH CV ECHO MEAS - IVSD: 1 CM
BH CV ECHO MEAS - LAT PEAK E' VEL: 9.6 CM/SEC
BH CV ECHO MEAS - LV MAX PG: 2.9 MMHG
BH CV ECHO MEAS - LV MEAN PG: 1.7 MMHG
BH CV ECHO MEAS - LV V1 MAX: 85 CM/SEC
BH CV ECHO MEAS - LV V1 VTI: 17 CM
BH CV ECHO MEAS - LVIDD: 4.2 CM
BH CV ECHO MEAS - LVIDS: 2.8 CM
BH CV ECHO MEAS - LVOT AREA: 3.8 CM2
BH CV ECHO MEAS - LVOT DIAM: 2.2 CM
BH CV ECHO MEAS - LVPWD: 1 CM
BH CV ECHO MEAS - MED PEAK E' VEL: 9 CM/SEC
BH CV ECHO MEAS - MV A MAX VEL: 53.1 CM/SEC
BH CV ECHO MEAS - MV E MAX VEL: 53.1 CM/SEC
BH CV ECHO MEAS - MV E/A: 1
BH CV ECHO MEAS - PA V2 MAX: 80 CM/SEC
BH CV ECHO MEAS - SV(LVOT): 64.7 ML
BH CV ECHO MEAS - SVI(LVOT): 36.7 ML/M2
BH CV ECHO MEAS - TAPSE (>1.6): 2.24 CM
BH CV ECHO MEASUREMENTS AVERAGE E/E' RATIO: 5.71
BH CV XLRA - TDI S': 12.9 CM/SEC
IVRT: 86 MS
LEFT ATRIUM VOLUME INDEX: 20.3 ML/M2
LV EF BIPLANE MOD: 55.1 %

## 2025-05-05 DIAGNOSIS — E11.9 TYPE 2 DIABETES MELLITUS WITHOUT COMPLICATION, WITHOUT LONG-TERM CURRENT USE OF INSULIN: ICD-10-CM

## 2025-05-06 RX ORDER — TIRZEPATIDE 5 MG/.5ML
INJECTION, SOLUTION SUBCUTANEOUS
Qty: 2 ML | Refills: 1 | Status: SHIPPED | OUTPATIENT
Start: 2025-05-06

## 2025-05-09 ENCOUNTER — PREP FOR SURGERY (OUTPATIENT)
Dept: OTHER | Facility: HOSPITAL | Age: 65
End: 2025-05-09
Payer: MEDICAID

## 2025-05-09 ENCOUNTER — OFFICE VISIT (OUTPATIENT)
Dept: ORTHOPEDIC SURGERY | Facility: CLINIC | Age: 65
End: 2025-05-09
Payer: MEDICAID

## 2025-05-09 VITALS — HEIGHT: 60 IN | BODY MASS INDEX: 35.34 KG/M2 | WEIGHT: 180 LBS

## 2025-05-09 DIAGNOSIS — M75.122 COMPLETE TEAR OF LEFT ROTATOR CUFF, UNSPECIFIED WHETHER TRAUMATIC: ICD-10-CM

## 2025-05-09 DIAGNOSIS — S49.92XA INJURY OF LEFT SHOULDER, INITIAL ENCOUNTER: Primary | ICD-10-CM

## 2025-05-09 NOTE — PROGRESS NOTES
"Chief Complaint  Follow-up of the Left Shoulder    Subjective          Cassandra Maria presents to [facility] for   History of Present Illness    Cassandra returns today for follow-up of her left shoulder.  She had left shoulder pain and weakness concerning for rotator cuff tear.  An MRI was obtained.  She returns today to discuss the MRI results and treatment options.      Allergies   Allergen Reactions    Gabapentin Mental Status Change        Social History     Socioeconomic History    Marital status:    Tobacco Use    Smoking status: Never     Passive exposure: Never    Smokeless tobacco: Never   Vaping Use    Vaping status: Never Used   Substance and Sexual Activity    Alcohol use: Never    Drug use: Never    Sexual activity: Defer        I reviewed the patient's chief complaint, history of present illness, review of systems, past medical history, surgical history, family history, social history, medications, and allergy list.     REVIEW OF SYSTEMS    Constitutional: Denies fevers, chills, weight loss  Cardiovascular: Denies chest pain, shortness of breath  Skin: Denies rashes, acute skin changes  Neurologic: Denies headache, loss of consciousness  MSK: Left shoulder pain      Objective   Vital Signs:   Ht 152.4 cm (60\")   Wt 81.6 kg (180 lb)   BMI 35.15 kg/m²     Body mass index is 35.15 kg/m².    Physical Exam    General: Alert. No acute distress.   Left upper extremity: forward elevation to 90 degrees with pain, external rotation at the side to 45 degrees, internal rotation to low lumbar with pain, 4/5 supraspinatus strength with pain, 4/5 infraspinatus with pain, 5/5 subscap, positive impingement, negative speed's, negative [place], non tender to the acromioclavicular joint, neurovascularly intact, positive pulses, sensation intact to the medial, radial and ulnar nerve     Procedures    Imaging Results (Most Recent)       None                     Assessment and Plan "        Adult Transthoracic Echo Complete W/ Cont if Necessary Per Protocol  Result Date: 5/2/2025  Narrative:   Left ventricular systolic function is normal. Left ventricular ejection fraction appears to be 56 - 60%.   Left ventricular diastolic function is consistent with (grade I) impaired relaxation.   There are no hemodynamically significant valvular abnormalities.     XR Shoulder 2+ View Left  Result Date: 4/17/2025  Narrative: XR SHOULDER 2+ VW LEFT Date of Exam: 4/15/2025 12:23 PM EDT Indication: left shoulder pain s/p fall Comparison: None available. Findings: No fracture or malalignment is identified. Mild acromioclavicular osteoarthritis is noted. Soft tissues appear normal.     Impression: Impression: Mild acromioclavicular osteoarthritis. No acute fracture or malalignment. Electronically Signed: Td Michelle MD  4/17/2025 12:28 PM EDT  Workstation ID: [id number]    DEXA Bone Density Axial  Result Date: 4/14/2025  Narrative: DEXA BONE DENSITY AXIAL Date of Exam: 4/14/2025 1:15 PM EDT Indication: screening. Comparison: 1/19/2022 Findings: Lumbar Spine The BMD measured in the L1-L4 region is 1.243 g/cm2 with a T-score of 0.4. This patient is considered normal according to World Health Organization (WHO) criteria. When compared to the previous examination dated 1/19/2022, the bone mineral density of the L1-L4 spine has apparently decreased 1.3%. Femoral Neck The BMD measured at the left femoral neck is 0.816 g/cm2 with a T-score of -1.6. The BMD measured at the right femoral neck is 0.847 g/cm2 with a T-score of -1.4. The BMD of the mean femoral neck is 0.832 g/cm2 with a T score of -1.5. This patient is considered osteopenic according to World Health Organization (WHO) criteria. When compared to the previous examination dated 1/19/2022, the bone mineral density of the femoral neck mean has apparently decreased 7.1%. Total Hip The BMD of the total left hip is 0.938 g/cm2 with a T-score of -0.6. The BMD of the  total right hip is 0.924 g/cm2 with a T-score of -0.7. The BMD of the mean total hip is 0.931 g/cm2 with a T-score of -0.6. This patient is considered normal according to World Health Organization (WHO) criteria. FRAX Score: The estimated 10 year risk of a major osteoporotic fracture is calculated to be 13.6% and a hip fracture of 1.4%.     Impression: Impression: Osteopenia -Based upon the FRAX score, patient does not meet criteria for initiation of pharmacological treatment recommendations for prevention of osteoporosis per the National Osteoporosis Foundation (NOF) guidelines. However, individualized treatment decisions should be made accounting for additional clinical factors. Report dictated by: Florida Salomon  I have personally reviewed this case and agree with the findings above: Electronically Signed: Brayan Freire MD  4/14/2025 2:42 PM EDT  Workstation ID: [id number]       Diagnoses and all orders for this visit:    1. Injury of left shoulder, initial encounter (Primary)    2. Complete tear of left rotator cuff, unspecified whether traumatic        We reviewed her imaging.  She does have a full-thickness supraspinatus tear with rotator cuff tendinopathy.  She also has biceps pathology intra-articularly.  She has AC arthrosis but no bony edema.  We discussed treatment options.  We discussed the risk, benefits, indications, and alternatives to the left shoulder arthroscopy with rotator cuff repair and possible augmentation, possible biceps tenotomy versus tenodesis, subacromial decompression.  She elected to proceed with surgical management.      Discussed surgery., Risks/benefits discussed with patient including, but not limited to: infection, bleeding, neurovascular damage, re-rupture, aesthetic deformity, need for further surgery, and death., Discussed with patient the implant type being used during surgery and patient understands., Surgery pamphlet given., and Call or return if worsening  symptoms.    Scribed for Wesley Farrell MD by Wesley Farrell MD  05/09/2025   12:27 EDT         Follow Up       2-week postop    Patient was given instructions and counseling regarding her condition or for health maintenance advice. Please see specific information pulled into the AVS if appropriate.       I have personally performed the services described in this document as scribed by the above individual and it is both accurate and complete. Wesley Farrell MD 05/09/25 12:29 EDT

## 2025-06-30 DIAGNOSIS — E11.9 TYPE 2 DIABETES MELLITUS WITHOUT COMPLICATION, WITHOUT LONG-TERM CURRENT USE OF INSULIN: ICD-10-CM

## 2025-06-30 RX ORDER — TIRZEPATIDE 5 MG/.5ML
INJECTION, SOLUTION SUBCUTANEOUS
Qty: 2 ML | Refills: 1 | Status: SHIPPED | OUTPATIENT
Start: 2025-06-30

## 2025-07-07 NOTE — PRE-PROCEDURE INSTRUCTIONS
PATIENT INSTRUCTED TO BE:    - NOTHING TO EAT AFTER MIDNIGHT OR CHEW, EXCEPT CAN HAVE SIPS OF WATER WITH MEDICATIONS OR CLEAR LIQUIDS 2 HOURS PRIOR TO SURGERY ARRIVAL TIME , NO MORE THAN 8 OZ. (NOTHING RED)     - TO HOLD ALL VITAMINS, SUPPLEMENTS, NSAIDS FOR ONE WEEK PRIOR TO THEIR SURGICAL PROCEDURE    - DO NOT TAKE __________MOUJARO LAST DOSE 6/23/25____________ 7 DAYS PRIOR TO PROCEDURE PER ANESTHESIA RECOMMENDATIONS/INSTRUCTIONS     - INSTRUCTED PT TO USE SURGICAL SOAP 1 TIME THE NIGHT PRIOR TO SURGERY ___________ OR THE AM OF SURGERY _____________   USE THE SOAP FROM NECK TO TOES, AVOID THEIR FACE, HAIR, AND PRIVATE PARTS. IF USE THE SOAP THE NIGHT PRIOR TO SURGERY, CHANGE BED LINENS AND NO PETS IN THE BED.     INSTRUCTED NO LOTIONS, JEWELRY, PIERCINGS,  NAIL POLISH, OR DEODORANT DAY OF SURGERY    - IF DIABETIC, CHECK BLOOD GLUCOSE IF LESS THAN 70 OR HAVING SYMPTOMS CALL THE PREOP AREA FOR INSTRUCTIONS ON AM OF SURGERY [phone])    -INSTRUCTED TO TAKE THE FOLLOWING MEDICATIONS THE DAY OF SURGERY WITH SIPS OF WATER:   ATORVASTATIN        - DO NOT BRING ANY MEDICATIONS WITH YOU TO THE HOSPITAL THE DAY OF SURGERY, EXCEPT IF USE INHALERS. BRING INHALERS DAY OF SURGERY       - BRING CPAP OR BIPAP TO THE HOSPITAL ONLY IF YOU ARE SPENDING THE NIGHT    - DO NOT SMOKE OR VAPE 24 HOURS PRIOR TO PROCEDURE PER ANESTHESIA REQUEST     -MAKE SURE YOU HAVE A RIDE HOME OR SOMEONE TO STAY WITH YOU THE DAY OF THE PROCEDURE AFTER YOU GO HOME     - FOLLOW ANY OTHER INSTRUCTIONS GIVEN TO YOU BY YOUR SURGEON'S OFFICE.     - DAY OF SURGERY __ AT [facility]),  PARK IN THE OPEN LOT, COME TO ENTRANCE / [facility], FIRST FLOOR, CHECK IN AT THE DESK FOR REGISTRATION/ SURGERY      - YOU WILL RECEIVE A PHONE CALL THE DAY PRIOR TO SURGERY BETWEEN 1PM AND 4 PM WITH ARRIVAL TIME, IF YOUR SURGERY IS ON A MONDAY YOU WILL RECEIVE A CALL THE FRIDAY PRIOR TO SURGERY DATE    - BRING CASH OR CREDIT CARD FOR COPAYMENT OF  MEDICATIONS AFTER SURGERY IF YOU USE THE HOSPITAL PHARMACY ([facility] TO BED)    - PREADMISSION TESTING NURSE [phone] IF HAVE ANY QUESTIONS     -PATIENT PROVIDED THE NUMBER FOR PREOP SURGICAL DEPT IF HAD QUESTIONS AFTER HOURS PRIOR TO SURGERY [phone]).  INFORMED PT IF NO ANSWER, LEAVE A MESSAGE AND SOMEONE WILL RETURN THEIR CALL       PATIENT VERBALIZED UNDERSTANDING

## 2025-07-08 ENCOUNTER — HOSPITAL ENCOUNTER (OUTPATIENT)
Facility: HOSPITAL | Age: 65
Setting detail: HOSPITAL OUTPATIENT SURGERY
Discharge: HOME | End: 2025-07-08
Attending: STUDENT IN AN ORGANIZED HEALTH CARE EDUCATION/TRAINING PROGRAM | Admitting: STUDENT IN AN ORGANIZED HEALTH CARE EDUCATION/TRAINING PROGRAM
Payer: MEDICAID

## 2025-07-08 ENCOUNTER — ANESTHESIA EVENT CONVERTED (OUTPATIENT)
Dept: ANESTHESIOLOGY | Facility: HOSPITAL | Age: 65
End: 2025-07-08
Payer: MEDICAID

## 2025-07-08 ENCOUNTER — ANESTHESIA (OUTPATIENT)
Dept: PERIOP | Facility: HOSPITAL | Age: 65
End: 2025-07-08
Payer: MEDICAID

## 2025-07-08 ENCOUNTER — ANESTHESIA EVENT (OUTPATIENT)
Dept: PERIOP | Facility: HOSPITAL | Age: 65
End: 2025-07-08
Payer: MEDICAID

## 2025-07-08 VITALS
BODY MASS INDEX: 35.02 KG/M2 | DIASTOLIC BLOOD PRESSURE: 66 MMHG | WEIGHT: 178.35 LBS | HEIGHT: 60 IN | HEART RATE: 70 BPM | RESPIRATION RATE: 16 BRPM | OXYGEN SATURATION: 95 % | SYSTOLIC BLOOD PRESSURE: 105 MMHG | TEMPERATURE: 97.8 F

## 2025-07-08 DIAGNOSIS — M75.122 COMPLETE TEAR OF LEFT ROTATOR CUFF, UNSPECIFIED WHETHER TRAUMATIC: ICD-10-CM

## 2025-07-08 LAB
ANION GAP SERPL CALCULATED.3IONS-SCNC: 10.6 MMOL/L (ref 5–15)
BUN SERPL-MCNC: 11.2 MG/DL (ref 8–23)
BUN/CREAT SERPL: 15.6 (ref 7–25)
CALCIUM SPEC-SCNC: 9.8 MG/DL (ref 8.6–10.5)
CHLORIDE SERPL-SCNC: 108 MMOL/L (ref 98–107)
CO2 SERPL-SCNC: 22.4 MMOL/L (ref 22–29)
CREAT SERPL-MCNC: 0.72 MG/DL (ref 0.57–1)
EGFRCR SERPLBLD CKD-EPI 2021: 93.5 ML/MIN/1.73
GLUCOSE SERPL-MCNC: 106 MG/DL (ref 65–99)
POTASSIUM SERPL-SCNC: 4.2 MMOL/L (ref 3.5–5.2)
SODIUM SERPL-SCNC: 141 MMOL/L (ref 136–145)

## 2025-07-08 PROCEDURE — 25810000003 LACTATED RINGERS PER 1000 ML: Performed by: ANESTHESIOLOGY

## 2025-07-08 PROCEDURE — 25010000002 ROPIVACAINE PER 1 MG: Performed by: ANESTHESIOLOGY

## 2025-07-08 PROCEDURE — 25010000002 FENTANYL CITRATE (PF) 50 MCG/ML SOLUTION: Performed by: MARRIAGE & FAMILY THERAPIST

## 2025-07-08 PROCEDURE — 25010000002 DEXAMETHASONE PER 1 MG: Performed by: MARRIAGE & FAMILY THERAPIST

## 2025-07-08 PROCEDURE — C1713 ANCHOR/SCREW BN/BN,TIS/BN: HCPCS | Performed by: STUDENT IN AN ORGANIZED HEALTH CARE EDUCATION/TRAINING PROGRAM

## 2025-07-08 PROCEDURE — 25010000002 FENTANYL CITRATE (PF) 50 MCG/ML SOLUTION: Performed by: ANESTHESIOLOGY

## 2025-07-08 PROCEDURE — 80048 BASIC METABOLIC PNL TOTAL CA: CPT | Performed by: STUDENT IN AN ORGANIZED HEALTH CARE EDUCATION/TRAINING PROGRAM

## 2025-07-08 PROCEDURE — 25010000002 CEFAZOLIN PER 500 MG: Performed by: STUDENT IN AN ORGANIZED HEALTH CARE EDUCATION/TRAINING PROGRAM

## 2025-07-08 PROCEDURE — 25010000002 SUGAMMADEX 200 MG/2ML SOLUTION: Performed by: MARRIAGE & FAMILY THERAPIST

## 2025-07-08 PROCEDURE — 25010000002 ONDANSETRON PER 1 MG: Performed by: MARRIAGE & FAMILY THERAPIST

## 2025-07-08 PROCEDURE — L3670 SO ACRO/CLAV CAN WEB PRE OTS: HCPCS | Performed by: STUDENT IN AN ORGANIZED HEALTH CARE EDUCATION/TRAINING PROGRAM

## 2025-07-08 PROCEDURE — 25010000002 LIDOCAINE PF 2% 2 % SOLUTION: Performed by: MARRIAGE & FAMILY THERAPIST

## 2025-07-08 PROCEDURE — 25010000002 MIDAZOLAM PER 1MG: Performed by: ANESTHESIOLOGY

## 2025-07-08 PROCEDURE — 25010000002 PROPOFOL 10 MG/ML EMULSION: Performed by: MARRIAGE & FAMILY THERAPIST

## 2025-07-08 DEVICE — SUT/ANCH FIBERTAK/RC/2.6 SFT SP 1.7MM/TIGERTAPE/LP WHT/BLK: Type: IMPLANTABLE DEVICE | Site: SHOULDER | Status: FUNCTIONAL

## 2025-07-08 DEVICE — SUT/ANCH BIOCOMP SWIVELOCK/SP KNOTLSS NMBR2/BLU 4.75X24.5MM: Type: IMPLANTABLE DEVICE | Site: SHOULDER | Status: FUNCTIONAL

## 2025-07-08 DEVICE — SYS IMP SUT/ANCH TENOD/SHLDR LOOPNTAK/KNOTLESS 4.75MM: Type: IMPLANTABLE DEVICE | Site: SHOULDER | Status: FUNCTIONAL

## 2025-07-08 DEVICE — SUT/ANCH FIBERTAK/RC/2.6 SFT SP 1.7MM/FIBERTAPE/LP BLU: Type: IMPLANTABLE DEVICE | Site: SHOULDER | Status: FUNCTIONAL

## 2025-07-08 RX ORDER — DOCUSATE SODIUM 100 MG/1
100 CAPSULE, LIQUID FILLED ORAL 2 TIMES DAILY PRN
Qty: 20 CAPSULE | Refills: 0 | Status: SHIPPED | OUTPATIENT
Start: 2025-07-08

## 2025-07-08 RX ORDER — MIDAZOLAM HYDROCHLORIDE 2 MG/2ML
2 INJECTION, SOLUTION INTRAMUSCULAR; INTRAVENOUS ONCE
Status: COMPLETED | OUTPATIENT
Start: 2025-07-08 | End: 2025-07-08

## 2025-07-08 RX ORDER — EPHEDRINE SULFATE 50 MG/ML
INJECTION INTRAVENOUS AS NEEDED
Status: DISCONTINUED | OUTPATIENT
Start: 2025-07-08 | End: 2025-07-08 | Stop reason: SURG

## 2025-07-08 RX ORDER — ONDANSETRON 2 MG/ML
INJECTION INTRAMUSCULAR; INTRAVENOUS AS NEEDED
Status: DISCONTINUED | OUTPATIENT
Start: 2025-07-08 | End: 2025-07-08 | Stop reason: SURG

## 2025-07-08 RX ORDER — SODIUM CHLORIDE, SODIUM LACTATE, POTASSIUM CHLORIDE, CALCIUM CHLORIDE 600; 310; 30; 20 MG/100ML; MG/100ML; MG/100ML; MG/100ML
9 INJECTION, SOLUTION INTRAVENOUS CONTINUOUS PRN
Status: DISCONTINUED | OUTPATIENT
Start: 2025-07-08 | End: 2025-07-08 | Stop reason: HOSPADM

## 2025-07-08 RX ORDER — PROMETHAZINE HYDROCHLORIDE 12.5 MG/1
25 TABLET ORAL ONCE AS NEEDED
Status: DISCONTINUED | OUTPATIENT
Start: 2025-07-08 | End: 2025-07-08 | Stop reason: HOSPADM

## 2025-07-08 RX ORDER — PROMETHAZINE HYDROCHLORIDE 25 MG/1
25 SUPPOSITORY RECTAL ONCE AS NEEDED
Status: DISCONTINUED | OUTPATIENT
Start: 2025-07-08 | End: 2025-07-08 | Stop reason: HOSPADM

## 2025-07-08 RX ORDER — ONDANSETRON 4 MG/1
4 TABLET, FILM COATED ORAL EVERY 8 HOURS PRN
Qty: 30 TABLET | Refills: 0 | Status: SHIPPED | OUTPATIENT
Start: 2025-07-08

## 2025-07-08 RX ORDER — HYDROCODONE BITARTRATE AND ACETAMINOPHEN 7.5; 325 MG/1; MG/1
1 TABLET ORAL EVERY 4 HOURS PRN
Qty: 30 TABLET | Refills: 0 | Status: SHIPPED | OUTPATIENT
Start: 2025-07-08

## 2025-07-08 RX ORDER — ACETAMINOPHEN 500 MG
1000 TABLET ORAL ONCE
Status: COMPLETED | OUTPATIENT
Start: 2025-07-08 | End: 2025-07-08

## 2025-07-08 RX ORDER — ROCURONIUM BROMIDE 10 MG/ML
INJECTION, SOLUTION INTRAVENOUS AS NEEDED
Status: DISCONTINUED | OUTPATIENT
Start: 2025-07-08 | End: 2025-07-08 | Stop reason: SURG

## 2025-07-08 RX ORDER — PROPOFOL 10 MG/ML
VIAL (ML) INTRAVENOUS AS NEEDED
Status: DISCONTINUED | OUTPATIENT
Start: 2025-07-08 | End: 2025-07-08 | Stop reason: SURG

## 2025-07-08 RX ORDER — ONDANSETRON 2 MG/ML
4 INJECTION INTRAMUSCULAR; INTRAVENOUS ONCE AS NEEDED
Status: DISCONTINUED | OUTPATIENT
Start: 2025-07-08 | End: 2025-07-08 | Stop reason: HOSPADM

## 2025-07-08 RX ORDER — ROPIVACAINE HYDROCHLORIDE 5 MG/ML
INJECTION, SOLUTION EPIDURAL; INFILTRATION; PERINEURAL
Status: COMPLETED | OUTPATIENT
Start: 2025-07-08 | End: 2025-07-08

## 2025-07-08 RX ORDER — LIDOCAINE HYDROCHLORIDE 20 MG/ML
INJECTION, SOLUTION EPIDURAL; INFILTRATION; INTRACAUDAL; PERINEURAL AS NEEDED
Status: DISCONTINUED | OUTPATIENT
Start: 2025-07-08 | End: 2025-07-08 | Stop reason: SURG

## 2025-07-08 RX ORDER — FENTANYL CITRATE 50 UG/ML
INJECTION, SOLUTION INTRAMUSCULAR; INTRAVENOUS AS NEEDED
Status: DISCONTINUED | OUTPATIENT
Start: 2025-07-08 | End: 2025-07-08 | Stop reason: SURG

## 2025-07-08 RX ORDER — FENTANYL CITRATE 50 UG/ML
100 INJECTION, SOLUTION INTRAMUSCULAR; INTRAVENOUS ONCE
Refills: 0 | Status: COMPLETED | OUTPATIENT
Start: 2025-07-08 | End: 2025-07-08

## 2025-07-08 RX ORDER — OXYCODONE HYDROCHLORIDE 5 MG/1
5 TABLET ORAL
Status: DISCONTINUED | OUTPATIENT
Start: 2025-07-08 | End: 2025-07-08 | Stop reason: HOSPADM

## 2025-07-08 RX ORDER — MAGNESIUM HYDROXIDE 1200 MG/15ML
LIQUID ORAL AS NEEDED
Status: DISCONTINUED | OUTPATIENT
Start: 2025-07-08 | End: 2025-07-08 | Stop reason: HOSPADM

## 2025-07-08 RX ORDER — DEXAMETHASONE SODIUM PHOSPHATE 4 MG/ML
INJECTION, SOLUTION INTRA-ARTICULAR; INTRALESIONAL; INTRAMUSCULAR; INTRAVENOUS; SOFT TISSUE AS NEEDED
Status: DISCONTINUED | OUTPATIENT
Start: 2025-07-08 | End: 2025-07-08 | Stop reason: SURG

## 2025-07-08 RX ADMIN — SODIUM CHLORIDE, POTASSIUM CHLORIDE, SODIUM LACTATE AND CALCIUM CHLORIDE: 600; 310; 30; 20 INJECTION, SOLUTION INTRAVENOUS at 12:03

## 2025-07-08 RX ADMIN — SODIUM CHLORIDE, POTASSIUM CHLORIDE, SODIUM LACTATE AND CALCIUM CHLORIDE 9 ML/HR: 600; 310; 30; 20 INJECTION, SOLUTION INTRAVENOUS at 09:56

## 2025-07-08 RX ADMIN — MIDAZOLAM HYDROCHLORIDE 2 MG: 1 INJECTION, SOLUTION INTRAMUSCULAR; INTRAVENOUS at 10:29

## 2025-07-08 RX ADMIN — FENTANYL CITRATE 100 MCG: 50 INJECTION, SOLUTION INTRAMUSCULAR; INTRAVENOUS at 10:55

## 2025-07-08 RX ADMIN — DEXAMETHASONE SODIUM PHOSPHATE 4 MG: 4 INJECTION, SOLUTION INTRAMUSCULAR; INTRAVENOUS at 10:55

## 2025-07-08 RX ADMIN — EPHEDRINE SULFATE 5 MG: 50 INJECTION INTRAVENOUS at 11:23

## 2025-07-08 RX ADMIN — EPHEDRINE SULFATE 5 MG: 50 INJECTION INTRAVENOUS at 11:53

## 2025-07-08 RX ADMIN — EPHEDRINE SULFATE 10 MG: 50 INJECTION INTRAVENOUS at 11:10

## 2025-07-08 RX ADMIN — ACETAMINOPHEN 1000 MG: 500 TABLET ORAL at 09:56

## 2025-07-08 RX ADMIN — PROPOFOL 150 MG: 10 INJECTION, EMULSION INTRAVENOUS at 10:55

## 2025-07-08 RX ADMIN — LIDOCAINE HYDROCHLORIDE 50 MG: 20 INJECTION, SOLUTION INTRAVENOUS at 10:55

## 2025-07-08 RX ADMIN — FENTANYL CITRATE 100 MCG: 50 INJECTION, SOLUTION INTRAMUSCULAR; INTRAVENOUS at 10:29

## 2025-07-08 RX ADMIN — EPHEDRINE SULFATE 5 MG: 50 INJECTION INTRAVENOUS at 11:40

## 2025-07-08 RX ADMIN — SUGAMMADEX 200 MG: 100 INJECTION, SOLUTION INTRAVENOUS at 12:29

## 2025-07-08 RX ADMIN — ROPIVACAINE HYDROCHLORIDE 20 ML: 5 INJECTION, SOLUTION EPIDURAL; INFILTRATION; PERINEURAL at 10:33

## 2025-07-08 RX ADMIN — ONDANSETRON 4 MG: 2 INJECTION INTRAMUSCULAR; INTRAVENOUS at 12:29

## 2025-07-08 RX ADMIN — SODIUM CHLORIDE 2 G: 9 INJECTION, SOLUTION INTRAVENOUS at 11:01

## 2025-07-08 RX ADMIN — ROCURONIUM BROMIDE 50 MG: 10 INJECTION, SOLUTION INTRAVENOUS at 10:55

## 2025-07-08 NOTE — H&P
[facility]   PREOPERATIVE HISTORY AND PHYSICAL    Patient Name:Cassandra Maria  : 1960  MRN: [id number]  Primary Care Physician: Marquita Mata APRN  Date of admission: 2025    Subjective   Subjective     Chief Complaint: preoperative evaluation    History of Present Illness  Cassandra Maria is a 64 y.o. female who presents for preoperative evaluation. She is scheduled for Left Shoulder Arthroscopy with Rotator cuff repair and possible augmentation, possible biceps tenotomy versus tenodesis, subacromial decompression: Surgery using a joint camera to fix torn tendon and biceps in left shoulder (Left)    Review of Systems     14 point review of systems negative except as noted above in the HPI    Personal History     Past Medical History:   Diagnosis Date    Arthritis     Condition not found     Hernia    Diabetes mellitus     BG RUNS AROUND  IN AM    Foot pain, right 2018    Fracture of 5th metatarsal 2018    Gall stones     Heel pain     Hot flashes 04/10/2014    Impaired fasting glucose 2018    Joint pain 2014    Low back pain     Lumbar pain with radiation down left leg 2015    Macromastia     Migraine headache     Night sweats     Obesity 2018    Osteoporosis 2014    Pain in thoracic spine 04/10/2014    Rotator cuff tear, left     Sinus trouble     Vitamin D deficiency 04/10/2014       Past Surgical History:   Procedure Laterality Date    BILATERAL BREAST REDUCTION Bilateral 2022    Procedure: BREAST REDUCTION BILATERAL;  Surgeon: Daniele Walker MD;  Location: [facility];  Service: Plastics;  Laterality: Bilateral;    BILATERAL BREAST REDUCTION Bilateral 2023    Procedure: Bilateral breast scar revision.;  Surgeon: Daniele Walker MD;  [place] [facility];  Service: Plastics;  Laterality: Bilateral;     SECTION      CHOLECYSTECTOMY      [place]    COLONOSCOPY N/A  04/27/2023    Procedure: COLONOSCOPY;  Surgeon: Emiliano Jack MD;  Location: [facility];  Service: Gastroenterology;  Laterality: N/A;  HEMORRHOIDS    ENDOSCOPY  2016 2002    HYSTERECTOMY      INCONTINENCE SURGERY      Bladder sling    ROTATOR CUFF REPAIR      right    VENTRAL HERNIA REPAIR         Family History: Her family history includes Breast cancer in her mother and sister; Colon cancer (age of onset: 55) in her cousin; Colon cancer (age of onset: 67) in her paternal aunt; Diabetes in her mother, paternal aunt, and sister; Liver cancer in her father; Lung cancer in her father.     Social History: She  reports that she has never smoked. She has never been exposed to tobacco smoke. She has never used smokeless tobacco. She reports that she does not drink alcohol and does not use drugs.    Home Medications:  Alcohol Prep, Diclofenac Sodium, Lancets, Tirzepatide, acyclovir, atorvastatin, calamine, diclofenac, glucose blood, glucose monitor, hydrocortisone, lisinopril, metFORMIN, and vitamin D    Allergies:  She is allergic to gabapentin.    Objective    Objective     Vitals:    Temp:  [97 °F (36.1 °C)] 97 °F (36.1 °C)  Heart Rate:  [70] 70  Resp:  [16] 16  BP: (133)/(62) 133/62    Physical Exam    General: Alert, no acute distress  Cardiac: Intact peripheral pulses  Pulmonary: Nonlabored respirations  Left upper extremity:forward elevation to 90 degrees with pain, external rotation at the side to 45 degrees, internal rotation to low lumbar with pain, 4/5 supraspinatus strength with pain, 4/5 infraspinatus with pain, 5/5 subscap, positive impingement, negative speed's, negative [place], non tender to the acromioclavicular joint, neurovascularly intact, positive pulses, sensation intact to the medial, radial and ulnar nerve     Assessment & Plan   Assessment / Plan     Brief Patient Summary:  Cassandra Maria is a 64 y.o. female who presents for preoperative evaluation.    Pre-Op Diagnosis  Codes:      * Complete tear of left rotator cuff, unspecified whether traumatic [M75.122]    Active Hospital Problems:  Active Hospital Problems    Diagnosis     **Complete tear of left rotator cuff      Plan:   Procedure(s):  Left Shoulder Arthroscopy with Rotator cuff repair and possible augmentation, possible biceps tenotomy versus tenodesis, subacromial decompression: Surgery using a joint camera to fix torn tendon and biceps in left shoulder    The risks, benefits, and alternatives of the procedure including but not limited to bleeding, infection, neurovascular damage, chondral injury, retear, persistent pain, stiffness, functional limitation, arthritic progression, anesthesia risk including mortality, DVT/PE, need for additional procedures and risks of the anesthesia were discussed in detail with the patient and questions were answered. No guarantees were made or implied. Informed consent was obtained.    Wesley Farrell MD

## 2025-07-08 NOTE — OP NOTE
SHOULDER ARTHROSCOPY WITH ROTATOR CUFF REPAIR  Procedure Report    Patient Name:  Cassandra Maria  YOB: 1960    Date of Surgery:  7/8/2025     Indications: Cassandra is a 64-year-old female with a history of left rotator cuff tear.  We discussed the risk, benefits, indications, alternatives left shoulder arthroscopy with rotator cuff repair and possible augmentation, possible biceps tenotomy versus tenodesis, and subacromial decompression.  She elected to proceed with surgical management and consent was obtained.    Pre-op Diagnosis:   Complete tear of left rotator cuff, unspecified whether traumatic [M75.122]       Post-Op Diagnosis Codes:     * Complete tear of left rotator cuff, unspecified whether traumatic [M75.122]    Procedure:  Procedure(s):  Left Shoulder Arthroscopy with Rotator cuff repair, biceps tenodesis, subacromial decompression:     Staff:  Surgeon(s):  Wesley Farrell MD    Assistant: Alan Caceres APRN    Anesthesia: General    Estimated Blood Loss: minimal    Implants:    Implant Name Type Inv. Item Serial No.  Lot No. LRB No. Used Action   SYS IMP SUT/ANCH TENOD/SHLDR LOOPNTAK/KNOTLESS 4.75MM - [id number] Implant SYS IMP SUT/ANCH TENOD/SHLDR LOOPNTAK/KNOTLESS 4.75MM  [employer] [id number] Left 1 Implanted   SUT/ANCH BIOCOMP SWIVELOCK/SP KNOTLSS NMBR2/MARLO 4.75X24.5MM - [id number] Implant SUT/ANCH BIOCOMP SWIVELOCK/SP KNOTLSS NMBR2/MARLO 4.75X24.5MM  [employer] [id number] Left 1 Implanted   SUT/ANCH BIOCOMP SWIVELOCK/SP KNOTLSS NMBR2/MARLO 4.75X24.5MM - [id number] Implant SUT/ANCH BIOCOMP SWIVELOCK/SP KNOTLSS NMBR2/MARLO 4.75X24.5MM  [employer] [id number] Left 1 Implanted   SUT/ANCH FIBERTAK/RC/2.6 SFT SP 1.7MM/FIBERTAPE/LP MAROL - [id number] Implant SUT/ANCH FIBERTAK/RC/2.6 SFT SP 1.7MM/FIBERTAPE/LP MARLO  [employer] [id number] Left 1 Implanted   SUT/ANCH FIBERTAK/RC/2.6 SFT SP 1.7MM/TIGERTAPE/LP WHT/BLK - [id number] Implant SUT/ANCH FIBERTAK/RC/2.6 SFT SP 1.7MM/TIGERTAPE/LP WHT/BLK   [employer] [id number] Left 1 Implanted       Specimen:          None        Findings: Degenerative superior labral tearing, grade 2 glenohumeral chondrosis, full-thickness supraspinatus tear, subacromial enthesophyte    Complications: None    Description of Procedure: The patient was met in the preoperative holding area and the operative extremity was marked.  A preoperative peripheral nerve block was performed by the anesthesia team.  The patient was transported the operating room and laid supine on the operating room table.  General anesthesia was induced.  2 g of preoperative Ancef were administered.  The patient was then carefully placed in the lateral decubitus position with all extremities well-padded.  An arm holding device was used during the case.  The left upper extremity was prepped and draped in the usual sterile fashion.  A timeout was taken to ensure the appropriate patient, procedure, and procedural site.  All were in agreement.  I began by marking the superficial landmarks over the shoulder.  A vertical incision was made for a posterior viewing portal and the arthroscope was inserted into the glenohumeral joint.  A diagnostic arthroscopy was performed.  Grade 2 glenohumeral chondrosis was noted.  There was degenerative superior labral tearing with an unstable biceps anchor.  There was full-thickness tearing of the supraspinatus.  There was partial tearing of the upper border the subscapularis.  A vertical incision was made for an anterior working portal after needle localization.  I inserted the arthroscopic shaver.  I debrided the interval tissue to improve visualization.  The biceps was probed and was unstable.  I elected perform a biceps tenodesis.  I utilized a loop and tack technique.  A link suture was passed around and then through the biceps tendon.  The biceps was freed from the superior labrum.  I then utilized a swivel lock anchor to secure the biceps high in the bicipital groove.  This was  completed without complication.  I debrided the upper border the subscap back to intact insertional tissue.  I debrided the superior labrum back to a stable border.  The arthroscope was then removed from the glenohumeral joint and inserted into the subacromial space.  A vertical incision for a lateral working portal was created after needle localization.  A subacromial bursectomy was performed.  The rotator cuff tear was identified.  The footprint was cleared of soft tissue utilizing the shaver and cautery device.  I then planned my repair.  I elected for a double row repair.  Stab incisions were made off of the acromial margin.  I then drilled and inserted rotator cuff fiber tack anchors off of the articular margin.  I utilized both sliding and static sutures.  A sliding suture was tied medially decree medial compression.  I then moved laterally.  Utilized to self punching swivel lock anchors to secure my medial row sutures in speed bridge fashion.  This was completed without complication.  I utilized the knotless mechanism to secure the anterior dogear.  I was satisfied with the reduction of the rotator cuff tear.  No implant complications were noted.  I then utilized the arthroscopic shaver to perform a subacromial decompression, elevating the anterior enthesophyte on the acromion.  This was carried back to the level of the AC joint.  The distal clavicle was left intact.  At this point I was satisfied with my work in the shoulder.  All arthroscopic instrumentation was removed.  Wounds were closed with 3-0 nylon in interrupted fashion.  Wounds were dressed with Xeroform, 4 x 4, ABD, and tape.  The patient was placed into an abduction sling.  She woke from anesthesia without complication and was transferred to the recovery room in stable condition.  All surgical counts were correct.    Assistant: Alan Caceres APRN  was responsible for performing the following activities: Retraction, Suction, Irrigation,  Suturing, Closing, and Placing Dressing and their skilled assistance was necessary for the success of this case.    Wesley Farrell MD     Date: 7/8/2025  Time: 12:19 EDT

## 2025-07-08 NOTE — DISCHARGE INSTRUCTIONS
[facility]  Postop Instructions  Outpatient Shoulder Surgery    DIET  Begin with clear liquids and light foods (jello, soups, etc).  Progress to your normal diet if you are not nauseated.  Take pain medicine with food - crackers, bread, etc.    WOUND CARE  Maintain your operative dressing, loosen bandage if swelling or tingling of the elbow, wrist, or hand occurs.  It is normal for the shoulder to bleed and swell from the surgery site.  If blood soaks onto the bandage, do not become alarmed; reinforce with additional dressing.  If blood saturates more than 2 bandages, call [facility].  Remove surgical dressing on the 2nd post-operative day. If minimal drainage is present, apply bandaids over incisions.  Do not use antibiotic ointment.  To avoid infection, keep surgical incisions clean and dry.  You may shower on the 2nd day but do not submerge.    MEDICATIONS  Pain medication is injected into the wound and shoulder joint during surgery.  This will wear off within 8 to 12 hours.  The anesthesiologist's regional nerve block will usually wear off in 18 to 24 hours.  Aleve 500mg 2 times per day may be taken for pain if you do not have a history of bleeding or ulcers.  Some patients will require narcotic pain medication for a short period of time.  This can be taken as per directions on the bottle.  Potential narcotic side effects include: constipation, nausea, vomiting, sleepiness.  If nausea and vomiting continue for more than 12-24 hours, contact the office to have your medication changed.  Do not drive a car or operate machinery while taking the prescription pain medication.  Increase vegetables, whole grains, and water intake to decrease risk of constipation related to pain medications.  Drink a full glass of water with every dose of medication (prescription or over the counter) and take with food.    ACTIVITY  When sleeping or resting, inclined positions (ie recliner chair) and a pillow under the forearm  for support may provide better comfort.  Do not engage in activities which increase pain/swelling (lifting or any repetitive above shoulder-level activities) over the first 7-10 days following surgery or activities where you bring your arm away from your body.  Avoid long periods of sitting (without arm supported) or long distance traveling for 2 weeks.  No driving or operating heavy machinery until you are off all prescription pain medications.  You may return to sedentary work or school 1-3 days after surgery, if pain is tolerable with sling.  SLING  Repairs and Reconstructions:  For the first two weeks, you must wear sling/immobilizer at all times except when doing exercises.  When around people in close proximity or in inclement weather, you should wear sling for protection.    ICE THERAPY  Begin icing immediately after surgery using ice compression machine or cold packs.    EXERCISE   Begin shoulder exercises the following day after surgery unless otherwise instructed by the surgeon.  Pendulums, Saws, and Table Slides; 3 x   You may also begin elbow, wrist, and hand range of motion on the first post-operative day about 2-3 times per day.  Formal physical therapy, if needed, will begin 2 - 6 weeks after surgery.      Saws                             Pendulums                 Table Slides          EMERGENCIES  Contact [facility] if any of the following are present:  Painful swelling or numbness, tingling, color change, or coolness in the wrist or hand  Unrelenting pain  Fever over 101 (It is normal to have a low grade fever for the first day or two following surgery.)   Redness or worsening pain around incisions  Continuous drainage or bleeding from incision (a small amount of drainage is expected)  Difficulty breathing, wheezing  Excessive nausea/vomiting causing inability to keep anything down for 12-24hours  Inability to urinate    WHAT TO EXPECT AT YOUR FIRST POST OPERATIVE VISIT  Suture/stitches will be  removed and new bandage applied if needed.  Follow up X-rays may be taken

## 2025-07-08 NOTE — ANESTHESIA PROCEDURE NOTES
Peripheral Block    Pre-sedation assessment completed: 7/8/2025 10:28 AM    Patient reassessed immediately prior to procedure    Patient location during procedure: pre-op  Start time: 7/8/2025 10:29 AM  Stop time: 7/8/2025 10:33 AM  Reason for block: at surgeon's request and post-op pain management  Performed by  Anesthesiologist: Eliseo Morrell MD  Preanesthetic Checklist  Completed: patient identified, IV checked, site marked, risks and benefits discussed, surgical consent, monitors and equipment checked, pre-op evaluation and timeout performed  Prep:  Pt Position: supine (HOB elevated)  Sterile barriers:cap, washed/disinfected hands, sterile barriers, gloves, mask, partial drape and alcohol skin prep  Prep: ChloraPrep  Patient monitoring: blood pressure monitoring, continuous pulse oximetry and EKG  Procedure    Sedation: yes  Performed under: local infiltration  Guidance:ultrasound guided and nerve stimulator    ULTRASOUND INTERPRETATION.  Using ultrasound guidance a 22 G gauge needle was placed in close proximity to the brachial plexus nerve, at which point, under ultrasound guidance anesthetic was injected in the area of the nerve and spread of the anesthesia was seen on ultrasound in close proximity thereto.  There were no abnormalities seen on ultrasound; a digital image was taken; and the patient tolerated the procedure with no complications. Images:still images obtained, printed/placed on chart    Laterality:left  Block Type:interscalene  Injection Technique:single-shot  Needle Type:echogenic  Needle Gauge:22 G (2in)  Resistance on Injection: none    Medications Used: ropivacaine (NAROPIN) 0.5 % injection - Injection   20 mL - 7/8/2025 10:33:00 AM      Post Assessment  Injection Assessment: negative aspiration for heme, no paresthesia on injection and incremental injection  Patient Tolerance:comfortable throughout block  Complications:no  Additional Notes  The block or continuous infusion is requested by  the referring physician for management of postoperative pain, or pain related to a procedure. Ultrasound guidance (deemed medically necessary). Painless injection, pt was awake and conversant during the procedure without complications. Needle and surrounding structures visualized throughout procedure. No adverse reactions or complications seen during this period. Post-procedure image showed no signs of complication, and anatomy was consistent with an uncomplicated nerve blockade.  Performed by: Eliseo Morrell MD

## 2025-07-08 NOTE — ANESTHESIA PREPROCEDURE EVALUATION
Anesthesia Evaluation     Patient summary reviewed and Nursing notes reviewed   no history of anesthetic complications:   NPO Solid Status: > 8 hours  NPO Liquid Status: > 2 hours           Airway   Mallampati: II  TM distance: >3 FB  Neck ROM: full  Dental - normal exam     Pulmonary - negative pulmonary ROS and normal exam   Cardiovascular - normal exam  Exercise tolerance: good (4-7 METS)    (+) hypertension, hyperlipidemia      Neuro/Psych  (+) headaches  GI/Hepatic/Renal/Endo    (+) obesity, diabetes mellitus    Musculoskeletal     Abdominal   (+) obese   Substance History - negative use     OB/GYN negative ob/gyn ROS         Other   arthritis,     ROS/Med Hx Other: PAT Nursing Notes unavailable.     Mounjaro over a week ago.              Anesthesia Plan    ASA 2     general and regional     intravenous induction     Anesthetic plan, risks, benefits, and alternatives have been provided, discussed and informed consent has been obtained with: patient.    Plan discussed with CRNA.    CODE STATUS:

## 2025-07-08 NOTE — ANESTHESIA POSTPROCEDURE EVALUATION
Patient: Cassandra Maria    Procedure Summary       Date: 07/08/25 Room / Location: [facility]  /  [facility]    Anesthesia Start: 1051 Anesthesia Stop: 1242    Procedure: Left Shoulder Arthroscopy with Rotator cuff repair, biceps tenodesis, subacromial decompression: Surgery using a joint camera to fix torn tendon and biceps in left shoulder (Left: Shoulder) Diagnosis:       Complete tear of left rotator cuff, unspecified whether traumatic      (Complete tear of left rotator cuff, unspecified whether traumatic [M75.122])    Surgeons: Wesley Farrell MD Provider: Eliseo Morrell MD    Anesthesia Type: general, regional ASA Status: 2            Anesthesia Type: general, regional    Vitals  Vitals Value Taken Time   /66 07/08/25 13:43   Temp 36.2 °C (97.1 °F) 07/08/25 12:40   Pulse 69 07/08/25 13:44   Resp 18 07/08/25 12:40   SpO2 94 % 07/08/25 13:43   Vitals shown include unfiled device data.        Post Anesthesia Care and Evaluation    Patient location during evaluation: bedside  Patient participation: complete - patient participated  Level of consciousness: awake  Pain management: adequate    Airway patency: patent  PONV Status: none  Cardiovascular status: acceptable and stable  Respiratory status: acceptable  Hydration status: acceptable

## 2025-07-15 DIAGNOSIS — E11.9 TYPE 2 DIABETES MELLITUS WITHOUT COMPLICATION, WITHOUT LONG-TERM CURRENT USE OF INSULIN: ICD-10-CM

## 2025-07-16 RX ORDER — BLOOD-GLUCOSE METER
KIT MISCELLANEOUS
Qty: 100 EACH | Refills: 1 | Status: SHIPPED | OUTPATIENT
Start: 2025-07-16

## 2025-07-21 ENCOUNTER — OFFICE VISIT (OUTPATIENT)
Dept: ORTHOPEDIC SURGERY | Facility: CLINIC | Age: 65
End: 2025-07-21
Payer: MEDICAID

## 2025-07-21 VITALS — OXYGEN SATURATION: 96 % | HEART RATE: 70 BPM | SYSTOLIC BLOOD PRESSURE: 109 MMHG | DIASTOLIC BLOOD PRESSURE: 72 MMHG

## 2025-07-21 DIAGNOSIS — M75.122 COMPLETE TEAR OF LEFT ROTATOR CUFF, UNSPECIFIED WHETHER TRAUMATIC: ICD-10-CM

## 2025-07-21 DIAGNOSIS — Z98.890 S/P SHOULDER SURGERY: Primary | ICD-10-CM

## 2025-07-21 PROCEDURE — 3074F SYST BP LT 130 MM HG: CPT | Performed by: NURSE PRACTITIONER

## 2025-07-21 PROCEDURE — 99024 POSTOP FOLLOW-UP VISIT: CPT | Performed by: NURSE PRACTITIONER

## 2025-07-21 PROCEDURE — 3078F DIAST BP <80 MM HG: CPT | Performed by: NURSE PRACTITIONER

## 2025-07-21 PROCEDURE — 1160F RVW MEDS BY RX/DR IN RCRD: CPT | Performed by: NURSE PRACTITIONER

## 2025-07-21 PROCEDURE — 1159F MED LIST DOCD IN RCRD: CPT | Performed by: NURSE PRACTITIONER

## 2025-07-21 NOTE — PROGRESS NOTES
Chief Complaint  Follow-up of the Left Shoulder    Subjective          History of Present Illness    Ms. Cassandra Maria presents to [facility] today for a follow up of left shoulder. Patient is 2 weeks postop left shoulder arthroscopy with rotator cuff repair, biceps tenodesis, subacromial decompression performed by Dr. Farrell on July 8, 2025.  Patient states that overall, she is doing okay, pain is control, she has been following post-op instruction and wearing a sling.     Denies complications from incision site. Denies fever, chills or sweat.      Allergies   Allergen Reactions    Gabapentin Mental Status Change        Social History     Socioeconomic History    Marital status:    Tobacco Use    Smoking status: Never     Passive exposure: Never    Smokeless tobacco: Never   Vaping Use    Vaping status: Never Used   Substance and Sexual Activity    Alcohol use: Never    Drug use: Never    Sexual activity: Defer        I reviewed the patient's chief complaint, history of present illness, review of systems, past medical history, surgical history, family history, social history, medications, and allergy list.     REVIEW OF SYSTEMS    14 point review of systems negative except as noted above in the HPI      Objective     Vital Signs:   /72   Pulse 70   SpO2 96%     There is no height or weight on file to calculate BMI.    Physical Exam    General: Well developed, well nourished. Alert. No acute distress.    Patient comes in with a arm sling.  Sutures removed today without complications. Examination of left shoulder shows arthroscopy portals are well healing without active drainage or redness noted. No concerning signs of infection.  Bruising to the upper arm is still visible.  No pain with gentle glenohumeral joint range of motion.  Upper arm soft.  Elbow range of motion intact.  Forearm soft.  Active wrist and finger range of motion. Thumb opposition intact. Palmar  abduction of the thumb intact. Sensation intact to the axillary, median, radial, and ulnar nerve distributions. Palpable radial pulse.       Procedures    Peripheral Block  Result Date: 7/8/2025  Narrative: Eliseo Morrell MD     7/8/2025 10:34 AM Peripheral Block Pre-sedation assessment completed: 7/8/2025 10:28 AM Patient reassessed immediately prior to procedure Patient location during procedure: pre-op Start time: 7/8/2025 10:29 AM Stop time: 7/8/2025 10:33 AM Reason for block: at surgeon's request and post-op pain management Performed by Anesthesiologist: Eliseo Morrell MD Preanesthetic Checklist Completed: patient identified, IV checked, site marked, risks and benefits discussed, surgical consent, monitors and equipment checked, pre-op evaluation and timeout performed Prep: Pt Position: supine (HOB elevated) Sterile barriers:cap, washed/disinfected hands, sterile barriers, gloves, mask, partial drape and alcohol skin prep Prep: ChloraPrep Patient monitoring: blood pressure monitoring, continuous pulse oximetry and EKG Procedure Sedation: yes Performed under: local infiltration Guidance:ultrasound guided and nerve stimulator ULTRASOUND INTERPRETATION.  Using ultrasound guidance a 22 G gauge needle was placed in close proximity to the brachial plexus nerve, at which point, under ultrasound guidance anesthetic was injected in the area of the nerve and spread of the anesthesia was seen on ultrasound in close proximity thereto.  There were no abnormalities seen on ultrasound; a digital image was taken; and the patient tolerated the procedure with no complications. Images:still images obtained, printed/placed on chart Laterality:left Block Type:interscalene Injection Technique:single-shot Needle Type:echogenic Needle Gauge:22 G (2in) Resistance on Injection: none Medications Used: ropivacaine (NAROPIN) 0.5 % injection - Injection  20 mL - 7/8/2025 10:33:00 AM Post Assessment Injection Assessment: negative  aspiration for heme, no paresthesia on injection and incremental injection Patient Tolerance:comfortable throughout block Complications:no Additional Notes The block or continuous infusion is requested by the referring physician for management of postoperative pain, or pain related to a procedure. Ultrasound guidance (deemed medically necessary). Painless injection, pt was awake and conversant during the procedure without complications. Needle and surrounding structures visualized throughout procedure. No adverse reactions or complications seen during this period. Post-procedure image showed no signs of complication, and anatomy was consistent with an uncomplicated nerve blockade. Performed by: Eliseo Morrell MD               Assessment and Plan      Diagnoses and all orders for this visit:    1. S/P shoulder surgery (Primary)  -     Ambulatory Referral to Physical Therapy for Evaluation & Treatment    2. Complete tear of left rotator cuff, unspecified whether traumatic  -     Ambulatory Referral to Physical Therapy for Evaluation & Treatment        Ms. Cassandra Maria presents today 2 weeks postop left shoulder arthroscopy with rotator cuff repair, biceps tenodesis, subacromial decompression on July 8, 2025 performed by Dr. Farrell,     Sutures removed today without complications.  Arthroscopy portals are well-healing.  No active redness or drainage noted. No concerning signs of infection. Incision site care was reviewed today. Patient instructed not to soak or submerge incision. Do not apply any lotions, creams, or ointments to the incision at this time.  We discussed concerning signs and symptoms regarding the incision site.  Patient understood and agreed. Patient denies fever or chills.   Patient will continue full time sling wear for a minimum of 6 weeks total. Patient can remove the sling for range of motion and hygiene ONLY.  Instructed the patient that remaining inclined positions (ie recliner chair)  "and a pillow under the forearm for support while sleeping or resting may provide better comfort.  We discussed gentle shoulder range of motion exercises. Patient instructed to avoid lifting, pushing, or pulling with the left upper extremity. Patient understood and agreed.   Patient is to continue her pain medication or transition to OTC NSAIDs /Tyenol for pain control, advised to take with food to prevent GI upset.  Encourage the patient to apply ice pack to help swelling.  Formal physical therapy was ordered today.   Patient will return to the clinic for follow up in 4 week. No new Xrays needed at next visit. Advise the patient to call or return if symptoms worsen or patient has any concerns.        Follow Up     Return in about 4 weeks (around 8/18/2025).    Patient was given instructions and counseling regarding her condition or for health maintenance advice. Please see specific information pulled into the AVS if appropriate.     Electronically signed by GENESIS Novoa, 07/21/25, 8:17 AM EDT.    \" Dictated utilizing Dragon dictation: Part of this note may be electronic transcription/translation of spoken language to printed text by using the Dragon dictation system.\"  "

## 2025-08-14 ENCOUNTER — OFFICE VISIT (OUTPATIENT)
Dept: FAMILY MEDICINE CLINIC | Facility: CLINIC | Age: 65
End: 2025-08-14
Payer: MEDICARE

## 2025-08-14 VITALS
OXYGEN SATURATION: 98 % | HEIGHT: 60 IN | WEIGHT: 180 LBS | BODY MASS INDEX: 35.34 KG/M2 | HEART RATE: 69 BPM | DIASTOLIC BLOOD PRESSURE: 59 MMHG | SYSTOLIC BLOOD PRESSURE: 128 MMHG

## 2025-08-14 DIAGNOSIS — H66.92 LEFT OTITIS MEDIA, UNSPECIFIED OTITIS MEDIA TYPE: Primary | ICD-10-CM

## 2025-08-18 ENCOUNTER — OFFICE VISIT (OUTPATIENT)
Dept: ORTHOPEDIC SURGERY | Facility: CLINIC | Age: 65
End: 2025-08-18
Payer: MEDICARE

## 2025-08-18 VITALS — OXYGEN SATURATION: 98 % | HEART RATE: 78 BPM | DIASTOLIC BLOOD PRESSURE: 64 MMHG | SYSTOLIC BLOOD PRESSURE: 117 MMHG

## 2025-08-18 DIAGNOSIS — Z98.890 S/P SHOULDER SURGERY: Primary | ICD-10-CM

## 2025-08-18 DIAGNOSIS — M75.122 COMPLETE TEAR OF LEFT ROTATOR CUFF, UNSPECIFIED WHETHER TRAUMATIC: ICD-10-CM

## 2025-08-18 PROCEDURE — 3078F DIAST BP <80 MM HG: CPT | Performed by: NURSE PRACTITIONER

## 2025-08-18 PROCEDURE — 99024 POSTOP FOLLOW-UP VISIT: CPT | Performed by: NURSE PRACTITIONER

## 2025-08-18 PROCEDURE — 1160F RVW MEDS BY RX/DR IN RCRD: CPT | Performed by: NURSE PRACTITIONER

## 2025-08-18 PROCEDURE — 3074F SYST BP LT 130 MM HG: CPT | Performed by: NURSE PRACTITIONER

## 2025-08-18 PROCEDURE — 1159F MED LIST DOCD IN RCRD: CPT | Performed by: NURSE PRACTITIONER

## 2025-08-25 DIAGNOSIS — E11.9 TYPE 2 DIABETES MELLITUS WITHOUT COMPLICATION, WITHOUT LONG-TERM CURRENT USE OF INSULIN: ICD-10-CM

## 2025-08-25 RX ORDER — TIRZEPATIDE 5 MG/.5ML
INJECTION, SOLUTION SUBCUTANEOUS
Qty: 2 ML | Refills: 1 | Status: SHIPPED | OUTPATIENT
Start: 2025-08-25

## (undated) DEVICE — SUT PROLN 0 CT1 30IN 8424H

## (undated) DEVICE — CANN PASSPORT BUTN 8MM 4CM

## (undated) DEVICE — GLV SURG SENSICARE SLT PF LF 6.5 STRL

## (undated) DEVICE — INTENDED FOR TISSUE SEPARATION, AND OTHER PROCEDURES THAT REQUIRE A SHARP SURGICAL BLADE TO PUNCTURE OR CUT.: Brand: BARD-PARKER ® CARBON RIB-BACK BLADES

## (undated) DEVICE — BURSECTOR 5.5 MM X 125 MM.  DO NOT RESTERILIZE, DO NOT USE IF PACKAGE IS DAMAGED, KEEP DRY, KEEP AWAY FROM DIRECT SUNLIGHT: Brand: CROSSBLADE

## (undated) DEVICE — STERILE POLYISOPRENE POWDER-FREE SURGICAL GLOVES WITH EMOLLIENT COATING: Brand: PROTEXIS

## (undated) DEVICE — GLV SURG SENSICARE PI ORTHO SZ6.5 LF STRL

## (undated) DEVICE — SOLIDIFIER LIQLOC PLS 1500CC BT

## (undated) DEVICE — DRESSING,GAUZE,XEROFORM,CURAD,1"X8",ST: Brand: CURAD

## (undated) DEVICE — INTENDED FOR TISSUE SEPARATION, AND OTHER PROCEDURES THAT REQUIRE A SHARP SURGICAL BLADE TO PUNCTURE OR CUT.: Brand: BARD-PARKER ® STAINLESS STEEL BLADES

## (undated) DEVICE — SHOULDER ARTHROSCOPY-LF: Brand: MEDLINE INDUSTRIES, INC.

## (undated) DEVICE — LINER SURG CANSTR SXN S/RIGD 1500CC

## (undated) DEVICE — STPLR SKIN SUBCUTICULAR INSORB 2030

## (undated) DEVICE — SLV SCD KN/LEN ADJ EXPRSS BLENDED MD 1P/U

## (undated) DEVICE — SOL IRRG H2O PL/BG 1000ML STRL

## (undated) DEVICE — CVR HNDL LT SURG ACCSSRY BLU STRL

## (undated) DEVICE — ANTIBACTERIAL UNDYED BRAIDED (POLYGLACTIN 910), SYNTHETIC ABSORBABLE SUTURE: Brand: COATED VICRYL

## (undated) DEVICE — SUT ETHLN 3-0 FS118IN 663H

## (undated) DEVICE — Device: Brand: DEFENDO AIR/WATER/SUCTION AND BIOPSY VALVE

## (undated) DEVICE — GOWN,REINFORCE,POLY,SIRUS,BREATH SLV,XLG: Brand: MEDLINE

## (undated) DEVICE — PENCL E/S SMOKEEVAC W/TELESCP CANN

## (undated) DEVICE — PROXIMATE RH ROTATING HEAD SKIN STAPLERS (35 WIDE) CONTAINS 35 STAINLESS STEEL STAPLES: Brand: PROXIMATE

## (undated) DEVICE — GLV SURG SENSICARE POLYISPRN W/ALOE PF LF 6.5 GRN STRL

## (undated) DEVICE — SKIN PREP TRAY W/CHG: Brand: MEDLINE INDUSTRIES, INC.

## (undated) DEVICE — BLD CUT FORMLA AGGR PLS 4.0MM

## (undated) DEVICE — KT INST FOR FIBERTAK ANCHR 2.6MM DISP

## (undated) DEVICE — MARKR SKIN W/RULR AND LBL

## (undated) DEVICE — PAD,ABDOMINAL,8"X10",ST,LF: Brand: MEDLINE

## (undated) DEVICE — NDL HYPO PRECISIONGLIDE REG 22G 1 1/2

## (undated) DEVICE — GLV SURG SENSICARE PI PF LF 7 GRN STRL

## (undated) DEVICE — GOWN,REINFRCE,POLY,SIRUS,BREATH SLV,XXLG: Brand: MEDLINE

## (undated) DEVICE — GLV SURG SENSICARE PI ORTHO SZ8 LF STRL

## (undated) DEVICE — PLASTIC MAJOR-LF: Brand: MEDLINE INDUSTRIES, INC.

## (undated) DEVICE — SYR LL TP 10ML STRL

## (undated) DEVICE — TP NDL HD/SCORPION W/MEGALOADER 1P/U

## (undated) DEVICE — SOL IRR NACL 0.9PCT 3000ML

## (undated) DEVICE — APPL CHLORAPREP HI/LITE 26ML ORNG

## (undated) DEVICE — GLV SURG SENSICARE SLT PF LF 5.5 STRL

## (undated) DEVICE — SLV DISTRACTION STAR VELCRO XL DISP

## (undated) DEVICE — STERILE POLYISOPRENE POWDER-FREE SURGICAL GLOVES: Brand: PROTEXIS

## (undated) DEVICE — CANN TRPL DAM 7X7MM

## (undated) DEVICE — SHOULDER P.A.D. III: Brand: DEROYAL

## (undated) DEVICE — Device

## (undated) DEVICE — TBG INFLOW/OUTFLOW DUALWAVE 1P/U

## (undated) DEVICE — CONN JET HYDRA H20 AUXILIARY DISP

## (undated) DEVICE — DRSNG SURG AQUACEL AG 9X10CM

## (undated) DEVICE — 90-S CRUISE, SUCTION PROBE, NON-BENDABLE, MAX CUT LEVEL 1: Brand: SERFAS ENERGY